# Patient Record
Sex: FEMALE | Race: WHITE | NOT HISPANIC OR LATINO | Employment: OTHER | URBAN - METROPOLITAN AREA
[De-identification: names, ages, dates, MRNs, and addresses within clinical notes are randomized per-mention and may not be internally consistent; named-entity substitution may affect disease eponyms.]

---

## 2017-01-13 ENCOUNTER — LAB (OUTPATIENT)
Dept: LAB | Facility: CLINIC | Age: 82
End: 2017-01-13
Payer: COMMERCIAL

## 2017-01-13 ENCOUNTER — TRANSCRIBE ORDERS (OUTPATIENT)
Dept: LAB | Facility: CLINIC | Age: 82
End: 2017-01-13

## 2017-01-13 DIAGNOSIS — E03.9 HYPOTHYROIDISM: ICD-10-CM

## 2017-01-13 LAB — TSH SERPL DL<=0.05 MIU/L-ACNC: 1.6 UIU/ML (ref 0.36–3.74)

## 2017-01-13 PROCEDURE — 84443 ASSAY THYROID STIM HORMONE: CPT

## 2017-01-13 PROCEDURE — 36415 COLL VENOUS BLD VENIPUNCTURE: CPT

## 2017-02-08 ENCOUNTER — ALLSCRIPTS OFFICE VISIT (OUTPATIENT)
Dept: OTHER | Facility: OTHER | Age: 82
End: 2017-02-08

## 2017-03-27 ENCOUNTER — ALLSCRIPTS OFFICE VISIT (OUTPATIENT)
Dept: OTHER | Facility: OTHER | Age: 82
End: 2017-03-27

## 2017-08-04 ENCOUNTER — LAB (OUTPATIENT)
Dept: LAB | Facility: CLINIC | Age: 82
End: 2017-08-04
Payer: COMMERCIAL

## 2017-08-04 DIAGNOSIS — I10 ESSENTIAL (PRIMARY) HYPERTENSION: ICD-10-CM

## 2017-08-04 LAB
ALBUMIN SERPL BCP-MCNC: 3.6 G/DL (ref 3.5–5)
ALP SERPL-CCNC: 89 U/L (ref 46–116)
ALT SERPL W P-5'-P-CCNC: 28 U/L (ref 12–78)
ANION GAP SERPL CALCULATED.3IONS-SCNC: 8 MMOL/L (ref 4–13)
AST SERPL W P-5'-P-CCNC: 21 U/L (ref 5–45)
BASOPHILS # BLD AUTO: 0.03 THOUSANDS/ΜL (ref 0–0.1)
BASOPHILS NFR BLD AUTO: 1 % (ref 0–1)
BILIRUB SERPL-MCNC: 0.88 MG/DL (ref 0.2–1)
BUN SERPL-MCNC: 15 MG/DL (ref 5–25)
CALCIUM SERPL-MCNC: 8.9 MG/DL (ref 8.3–10.1)
CHLORIDE SERPL-SCNC: 101 MMOL/L (ref 100–108)
CHOLEST SERPL-MCNC: 180 MG/DL (ref 50–200)
CO2 SERPL-SCNC: 26 MMOL/L (ref 21–32)
CREAT SERPL-MCNC: 0.91 MG/DL (ref 0.6–1.3)
EOSINOPHIL # BLD AUTO: 0.11 THOUSAND/ΜL (ref 0–0.61)
EOSINOPHIL NFR BLD AUTO: 3 % (ref 0–6)
ERYTHROCYTE [DISTWIDTH] IN BLOOD BY AUTOMATED COUNT: 13.6 % (ref 11.6–15.1)
GFR SERPL CREATININE-BSD FRML MDRD: 59 ML/MIN/1.73SQ M
GLUCOSE P FAST SERPL-MCNC: 91 MG/DL (ref 65–99)
HCT VFR BLD AUTO: 39.5 % (ref 34.8–46.1)
HDLC SERPL-MCNC: 71 MG/DL (ref 40–60)
HGB BLD-MCNC: 13.7 G/DL (ref 11.5–15.4)
LDLC SERPL CALC-MCNC: 99 MG/DL (ref 0–100)
LYMPHOCYTES # BLD AUTO: 1.6 THOUSANDS/ΜL (ref 0.6–4.47)
LYMPHOCYTES NFR BLD AUTO: 42 % (ref 14–44)
MCH RBC QN AUTO: 32.1 PG (ref 26.8–34.3)
MCHC RBC AUTO-ENTMCNC: 34.7 G/DL (ref 31.4–37.4)
MCV RBC AUTO: 93 FL (ref 82–98)
MONOCYTES # BLD AUTO: 0.37 THOUSAND/ΜL (ref 0.17–1.22)
MONOCYTES NFR BLD AUTO: 10 % (ref 4–12)
NEUTROPHILS # BLD AUTO: 1.67 THOUSANDS/ΜL (ref 1.85–7.62)
NEUTS SEG NFR BLD AUTO: 44 % (ref 43–75)
NRBC BLD AUTO-RTO: 0 /100 WBCS
PLATELET # BLD AUTO: 324 THOUSANDS/UL (ref 149–390)
PMV BLD AUTO: 9.5 FL (ref 8.9–12.7)
POTASSIUM SERPL-SCNC: 4.1 MMOL/L (ref 3.5–5.3)
PROT SERPL-MCNC: 7.4 G/DL (ref 6.4–8.2)
RBC # BLD AUTO: 4.27 MILLION/UL (ref 3.81–5.12)
SODIUM SERPL-SCNC: 135 MMOL/L (ref 136–145)
TRIGL SERPL-MCNC: 50 MG/DL
TSH SERPL DL<=0.05 MIU/L-ACNC: 0.89 UIU/ML (ref 0.36–3.74)
WBC # BLD AUTO: 3.79 THOUSAND/UL (ref 4.31–10.16)

## 2017-08-04 PROCEDURE — 80061 LIPID PANEL: CPT

## 2017-08-04 PROCEDURE — 84443 ASSAY THYROID STIM HORMONE: CPT

## 2017-08-04 PROCEDURE — 85025 COMPLETE CBC W/AUTO DIFF WBC: CPT

## 2017-08-04 PROCEDURE — 80053 COMPREHEN METABOLIC PANEL: CPT

## 2017-08-04 PROCEDURE — 36415 COLL VENOUS BLD VENIPUNCTURE: CPT

## 2017-08-08 DIAGNOSIS — I10 ESSENTIAL (PRIMARY) HYPERTENSION: ICD-10-CM

## 2017-08-11 ENCOUNTER — ALLSCRIPTS OFFICE VISIT (OUTPATIENT)
Dept: OTHER | Facility: OTHER | Age: 82
End: 2017-08-11

## 2017-11-02 ENCOUNTER — GENERIC CONVERSION - ENCOUNTER (OUTPATIENT)
Dept: OTHER | Facility: OTHER | Age: 82
End: 2017-11-02

## 2018-01-13 VITALS
HEIGHT: 60 IN | BODY MASS INDEX: 27.51 KG/M2 | HEART RATE: 60 BPM | WEIGHT: 140.13 LBS | DIASTOLIC BLOOD PRESSURE: 80 MMHG | SYSTOLIC BLOOD PRESSURE: 152 MMHG

## 2018-01-13 VITALS
DIASTOLIC BLOOD PRESSURE: 86 MMHG | TEMPERATURE: 97 F | BODY MASS INDEX: 27.29 KG/M2 | HEART RATE: 60 BPM | SYSTOLIC BLOOD PRESSURE: 138 MMHG | OXYGEN SATURATION: 98 % | HEIGHT: 60 IN | WEIGHT: 139 LBS

## 2018-01-14 VITALS
WEIGHT: 141 LBS | HEIGHT: 59 IN | BODY MASS INDEX: 28.43 KG/M2 | DIASTOLIC BLOOD PRESSURE: 64 MMHG | SYSTOLIC BLOOD PRESSURE: 142 MMHG | TEMPERATURE: 97.1 F

## 2018-01-23 NOTE — PROGRESS NOTES
Assessment    1  Encounter for preventive health examination (V70 0) (Z00 00)    Discussion/Summary  Impression: Initial Annual Wellness Visit  Cardiovascular screening and counseling: screening is current  Diabetes screening and counseling: screening is current  Colorectal cancer screening and counseling: screening is current  Breast cancer screening and counseling: screening not indicated  Cervical cancer screening and counseling: screening not indicated  Osteoporosis screening and counseling: screening is current  Abdominal aortic aneurysm screening and counseling: screening not indicated  Glaucoma screening and counseling: screening is current  Immunizations: the patient declines the influenza vaccination, the lifetime pneumococcal vaccine has been completed, Td vaccination up to date and Tdap vaccination up to date  Advance Directive Planning: complete and up to date, paperwork and instructions were given to the patient  Patient Discussion: plan discussed with the patient, follow-up visit needed in one year  Chief Complaint  MED WELL      History of Present Illness  Welcome to Medicare and Wellness Visits: The patient is being seen for the subsequent annual wellness visit  Medicare Screening and Risk Factors   Hospitalizations: no previous hospitalizations  Once per lifetime medicare screening tests: AAA screening   Medicare Screening Tests Risk Questions   Osteoporosis risk assessment:  and over 48years of age  HIV risk assessment: none indicated  Drug and Alcohol Use: The patient has never smoked cigarettes  The patient reports never drinking alcohol  She has never used illicit drugs  Diet and Physical Activity: Current diet includes well balanced meals, 3 servings of fruit per day, 3 servings of vegetables per day, 2 servings of meat per day, 1 servings of whole grains per day, 2 cups of coffee per day and 2 cups of tea per day   She exercises 3 times per week  Exercise: walking, stretching, strength training 60 minutes per week  Mood Disorder and Cognitive Impairment Screening:   Depression screening  negative for symptoms  Functional Ability/Level of Safety: Hearing is slightly decreased and a hearing aid is used  The patient is currently able to do activities of daily living without limitations, able to do instrumental activities of daily living without limitations, able to participate in social activities without limitations and able to drive without limitations  Activities of daily living details: does not need help using the phone, no transportation help needed, does not need help shopping, no meal preparation help needed, does not need help doing housework, does not need help doing laundry, does not need help managing medications and does not need help managing money  Fall risk factors: The patient fell 0 times in the past 12 months  Advance Directives: Advance directives: no living will  Co-Managers and Medical Equipment/Suppliers: See Patient Care Team   Preventive Quality Program 65 and Older: Falls Risk: The patient fell 0 times in the past 12 months  Patient Care Team    Care Team Member Role Specialty Office Number   [address] [phone]   [facility]  Cardiology [phone]   [facility]  Gastroenterology Adult [phone]   Scott Lane DO  Cardiology [phone]     Active Problems     1  Age-related osteoporosis without fracture (733 01) (M81 8)   2  Atrial fibrillation (427 31) (I48 91)   3  CAD (coronary artery disease) (414 00) (I25 10)   4  Hypercholesterolemia (272 0) (E78 00)   5  Hyperlipidemia (272 4) (E78 5)   6  Hypothyroidism (244 9) (E03 9)   7  Neuropathy (355 9) (G62 9)   8  Postherpetic neuralgia (053 19) (B02 29)   9  Screening for genitourinary condition (V81 6) (Z13 89)   10  Special screening for other neurological conditions (V80 09) (Z13 89)   11   Supraventricular tachycardia (427 89) (I47 1)    Acquired hypothyroidism (244 9) (E03 9)       Benign essential hypertension (401 1) (I10)          Past Medical History    · History of Abnormal EKG (794 31) (R94 31)   · History of Chest pain syndrome (786 50) (R07 9)   · History of cardiac arrhythmia (V12 59) (Z86 79)   · History of esophageal reflux (V12 79) (Z87 19)   · History of gastroesophageal reflux (GERD) (V12 79) (Z87 19)   · History of hypertension (V12 59) (Z86 79)   · History of hypothyroidism (V12 29) (Z86 39)   · History of Palpitations (785 1) (R00 2)   · History of Varicella-zoster infection (053 9) (B02 9,B01  9)    The active problems and past medical history were reviewed and updated today  Surgical History    · History of Cholecystectomy   · History of Colon Surgery   · History of Hysterectomy   · History of PTCA   · History of Small Bowel Resection Partial    The surgical history was reviewed and updated today  Family History  Mother    · Family history of    · Family history of cardiac disorder (V17 49) (Z80 55)  Father    · Family history of    · Family history of cardiac disorder (V17 49) (Z82 49)   · Family history of chronic obstructive pulmonary disease (V17 6) (Z82 5)    The family history was reviewed and updated today  Social History    ·    · Never a smoker   · No alcohol use   · No drug use   · Non-smoker (V49 89) (Z78 9)   · Retired   · 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[employer]   · 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  The social history was reviewed and is unchanged  Current Meds   1  ALPRAZolam 0 25 MG Oral Tablet; take 1 tablet daily as needed; Therapy: (Recorded:42Ymn0094) to Recorded   2  Aspirin Low Dose 81 MG TABS; Take 1 tablet daily; Therapy: 64REL6390 to (Evaluate:15Cvw7523) Recorded   3  CoQ-10 10 MG CAPS; TAKE CAPSULE Daily; Therapy: 85Iem4560 to Recorded   4  Levothyroxine Sodium 75 MCG Oral Tablet; TAKE 1 TABLET DAILY;    Therapy: (Benjiman Duverney) to Recorded   5  Metoprolol Succinate ER 25 MG Oral Tablet Extended Release 24 Hour; take 0 5 tablet   daily; Therapy: 20Sdo2854 to (Evaluate:61Kdy6777)  Requested for: 44Jpb9487; Last   Rx:[id number]; Status: ACTIVE - Transmit to [place] Verification Ordered    The medication list was reviewed and updated today  Allergies    1  Bextra TABS   2  codeine   3  Demerol SOLN   4  Diovan   5  enalapril   6  lisinopril   7  Penicillins   8  sulfa   9  tramadol   10  Zmax SUSR   11  Zocor    Immunizations   1    Tdap  09-Sep-2015     Physical Exam    Constitutional   General appearance: No acute distress, well appearing and well nourished  Results/Data  PHQ-9 Adult Depression Screening 07WGI0856 10:41AM User, s     Test Name Result Flag Reference   PHQ-9 Adult Depression Score 1     Over the last two weeks, how often have you been bothered by any of the following problems? Little interest or pleasure in doing things: Not at all - 0  Feeling down, depressed, or hopeless: Not at all - 0  Trouble falling or staying asleep, or sleeping too much: Not at all - 0  Feeling tired or having little energy: Several days - 1  Poor appetite or over eating: Not at all - 0  Feeling bad about yourself - or that you are a failure or have let yourself or your family down: Not at all - 0  Trouble concentrating on things, such as reading the newspaper or watching television: Not at all - 0  Moving or speaking so slowly that other people could have noticed  Or the opposite -  being so fidgety or restless that you have been moving around a lot more than usual: Not at all - 0  Thoughts that you would be better off dead, or of hurting yourself in some way: Not at all - 0   PHQ-9 Adult Depression Screening Negative     PHQ-9 Difficulty Level Not difficult at all     PHQ-9 Severity Minimal Depression         Future Appointments    Date/Time Provider Specialty Site   03/27/2017 11:20 AM SHASTA Doyle   Cardiology [facility] CARDIOLOGY [facility]     Signatures   Electronically signed by : Omar Dueñas DO; Feb 8 2017 10:56AM EST                       (Author)

## 2018-02-11 DIAGNOSIS — E03.9 HYPOTHYROIDISM: ICD-10-CM

## 2018-02-20 ENCOUNTER — APPOINTMENT (OUTPATIENT)
Dept: LAB | Facility: CLINIC | Age: 83
End: 2018-02-20
Payer: COMMERCIAL

## 2018-02-20 DIAGNOSIS — E03.9 HYPOTHYROIDISM: ICD-10-CM

## 2018-02-20 DIAGNOSIS — E55.9 VITAMIN D DEFICIENCY: Primary | ICD-10-CM

## 2018-02-20 LAB
25(OH)D3 SERPL-MCNC: 47.2 NG/ML (ref 30–100)
TSH SERPL DL<=0.05 MIU/L-ACNC: 1.37 UIU/ML (ref 0.36–3.74)

## 2018-02-20 PROCEDURE — 84443 ASSAY THYROID STIM HORMONE: CPT

## 2018-02-20 PROCEDURE — 82306 VITAMIN D 25 HYDROXY: CPT

## 2018-02-20 PROCEDURE — 36415 COLL VENOUS BLD VENIPUNCTURE: CPT

## 2018-02-27 ENCOUNTER — OFFICE VISIT (OUTPATIENT)
Dept: FAMILY MEDICINE CLINIC | Facility: CLINIC | Age: 83
End: 2018-02-27
Payer: COMMERCIAL

## 2018-02-27 VITALS
WEIGHT: 140 LBS | DIASTOLIC BLOOD PRESSURE: 78 MMHG | HEART RATE: 111 BPM | TEMPERATURE: 97.3 F | BODY MASS INDEX: 28.22 KG/M2 | SYSTOLIC BLOOD PRESSURE: 130 MMHG | HEIGHT: 59 IN | OXYGEN SATURATION: 96 %

## 2018-02-27 DIAGNOSIS — I10 ESSENTIAL HYPERTENSION: Primary | ICD-10-CM

## 2018-02-27 DIAGNOSIS — E03.9 HYPOTHYROIDISM, UNSPECIFIED TYPE: ICD-10-CM

## 2018-02-27 DIAGNOSIS — E78.00 HYPERCHOLESTEROLEMIA: ICD-10-CM

## 2018-02-27 DIAGNOSIS — I25.10 CORONARY ARTERY DISEASE INVOLVING NATIVE CORONARY ARTERY OF NATIVE HEART WITHOUT ANGINA PECTORIS: ICD-10-CM

## 2018-02-27 DIAGNOSIS — I47.1 SVT (SUPRAVENTRICULAR TACHYCARDIA) (HCC): ICD-10-CM

## 2018-02-27 DIAGNOSIS — Z23 NEED FOR PNEUMOCOCCAL VACCINE: ICD-10-CM

## 2018-02-27 PROCEDURE — 99214 OFFICE O/P EST MOD 30 MIN: CPT | Performed by: FAMILY MEDICINE

## 2018-02-27 PROCEDURE — 90670 PCV13 VACCINE IM: CPT

## 2018-02-27 PROCEDURE — G0009 ADMIN PNEUMOCOCCAL VACCINE: HCPCS

## 2018-02-27 NOTE — PROGRESS NOTES
Assessment/Plan:    No problem-specific Assessment & Plan notes found for this encounter  Diagnoses and all orders for this visit:    Essential hypertension  -     CBC and differential; Future  -     Comprehensive metabolic panel; Future    Hypothyroidism, unspecified type  -     TSH, 3rd generation with T4 reflex; Future    Hypercholesterolemia  Comments:  pt has a high HDL and is under good control  Orders:  -     CBC and differential; Future  -     Comprehensive metabolic panel; Future  -     Lipid panel; Future    Coronary artery disease involving native coronary artery of native heart without angina pectoris  Comments:  stable pt is seeing the cardiologist  Orders:  -     Lipid panel; Future    SVT (supraventricular tachycardia) (HCC)  Comments:  resolved since the pt's heart ablation          Subjective:      Patient ID: Mariela Azevedo is a 80 y o  female  Follow up for hypothyroidism pt had a recent TSH which was normal, pt checks Bps at home and sees an average of 130 over 70      Hypertension   This is a chronic problem  The current episode started more than 1 year ago  Pertinent negatives include no chest pain, palpitations or shortness of breath  The following portions of the patient's history were reviewed and updated as appropriate: allergies, current medications, past family history, past medical history, past social history, past surgical history and problem list     Review of Systems   Constitutional: Negative for chills, fatigue and fever  Respiratory: Negative for shortness of breath and wheezing  Cardiovascular: Negative for chest pain and palpitations  Gastrointestinal: Negative for abdominal pain, constipation and diarrhea  Objective:      /78   Pulse (!) 111   Temp (!) 97 3 °F (36 3 °C)   Ht 4' 11" (1 499 m)   Wt 63 5 kg (140 lb)   SpO2 96%   BMI 28 28 kg/m²          Physical Exam   Constitutional: She appears well-developed and well-nourished   No distress  HENT:   Head: Normocephalic and atraumatic  Mouth/Throat: No oropharyngeal exudate  Neck: Normal range of motion  Neck supple  Pulmonary/Chest: No stridor  Abdominal: Soft  Bowel sounds are normal  She exhibits no distension and no mass  There is no tenderness  There is no rebound and no guarding  Lymphadenopathy:     She has no cervical adenopathy  Skin: Skin is warm and dry  No rash noted  She is not diaphoretic  No erythema  No pallor  Psychiatric: She has a normal mood and affect  Her behavior is normal  Judgment and thought content normal    Nursing note and vitals reviewed

## 2018-04-17 ENCOUNTER — OFFICE VISIT (OUTPATIENT)
Dept: CARDIOLOGY CLINIC | Facility: CLINIC | Age: 83
End: 2018-04-17
Payer: COMMERCIAL

## 2018-04-17 VITALS
SYSTOLIC BLOOD PRESSURE: 130 MMHG | DIASTOLIC BLOOD PRESSURE: 80 MMHG | WEIGHT: 137 LBS | BODY MASS INDEX: 27.62 KG/M2 | HEIGHT: 59 IN | HEART RATE: 58 BPM

## 2018-04-17 DIAGNOSIS — I25.10 CORONARY ARTERY DISEASE INVOLVING NATIVE CORONARY ARTERY OF NATIVE HEART WITHOUT ANGINA PECTORIS: Primary | ICD-10-CM

## 2018-04-17 DIAGNOSIS — I47.1 SUPRAVENTRICULAR TACHYCARDIA (HCC): ICD-10-CM

## 2018-04-17 DIAGNOSIS — E78.00 HYPERCHOLESTEROLEMIA: ICD-10-CM

## 2018-04-17 PROCEDURE — 99214 OFFICE O/P EST MOD 30 MIN: CPT | Performed by: INTERNAL MEDICINE

## 2018-04-17 PROCEDURE — 93000 ELECTROCARDIOGRAM COMPLETE: CPT | Performed by: INTERNAL MEDICINE

## 2018-04-17 RX ORDER — METOPROLOL SUCCINATE 25 MG/1
0.5 TABLET, EXTENDED RELEASE ORAL DAILY
COMMUNITY
Start: 2016-08-30 | End: 2018-08-13 | Stop reason: SDUPTHER

## 2018-04-17 NOTE — PROGRESS NOTES
Cardiology Follow Up    Иван Mckee  1933  [id number]  [address] [facility]  [address] [place] [address] [address]    1  Coronary artery disease involving native coronary artery of native heart without angina pectoris  POCT ECG   2  Supraventricular tachycardia (HCC)  POCT ECG   3  Hypercholesterolemia         Interval History:  Cardiology follow-up  Patient continues to do well, denies any chest discomfort, she still very active for age  She states been compliant with low-cholesterol diet  She is intolerant to statin therapy  Lipids are septal with an LDL 99 few months ago  She is compliant with low-sodium diet, blood pressure is well control as well, she does have multiple drug intolerances      Patient Active Problem List   Diagnosis    Supraventricular tachycardia ([address] )    Coronary artery disease involving native coronary artery of native heart without angina pectoris    Hypercholesterolemia     Past Medical History:   Diagnosis Date    Cardiac arrhythmia     Esophageal reflux     Hypertension     Hypothyroidism     Varicella-zoster infection      Social History     Social History    Marital status: /Civil Union     Spouse name: N/A    Number of children: 3    Years of education: N/A     Occupational History    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     Social History Main Topics    Smoking status: Never Smoker    Smokeless tobacco: Never Used    Alcohol use No    Drug use: No    Sexual activity: Not on file     Other Topics Concern    Not on file     Social History Narrative    No narrative on file      Family History   Problem Relation Age of Onset    Heart disease Mother     Heart disease Father     COPD Father      Past Surgical History:   Procedure Laterality Date    CHOLECYSTECTOMY      COLON SURGERY      CORONARY ANGIOPLASTY      PARTIAL HYSTERECTOMY      SMALL INTESTINE SURGERY Partial       Current Outpatient Prescriptions:     aspirin 81 MG tablet, Take 81 mg by mouth daily  , Disp: , Rfl:     Coenzyme Q10 (CO Q10) 100 MG CAPS, Take 1 capsule by mouth daily  , Disp: , Rfl:     levothyroxine 75 mcg tablet, Take 75 mcg by mouth daily  , Disp: , Rfl:     metoprolol succinate (TOPROL-XL) 25 mg 24 hr tablet, Take 0 5 tablets by mouth daily, Disp: , Rfl:     Vitamin Mixture (VITAMIN E COMPLETE PO), Take 150 mg by mouth daily  , Disp: , Rfl:   Allergies   Allergen Reactions    Bextra [Valdecoxib]     Codeine     Demerol [Meperidine]     Diovan [Valsartan]     Enalapril     Lisinopril     Penicillins     Sulfa Antibiotics     Tramadol     Vicodin [Hydrocodone-Acetaminophen]     Zithromax [Azithromycin]     Zocor [Simvastatin]        Labs:  Appointment on 02/20/2018   Component Date Value    TSH 3RD GENERATON 02/20/2018 1 370    Orders Only on 02/20/2018   Component Date Value    Vit D, 25-Hydroxy 02/20/2018 47 2      Imaging: No results found  Review of Systems:  Review of Systems   Constitutional: Negative for activity change, fatigue, fever and unexpected weight change  HENT: Negative for nosebleeds  Eyes: Negative for visual disturbance  Respiratory: Positive for shortness of breath  Negative for wheezing and stridor  Cardiovascular: Positive for leg swelling  Negative for chest pain and palpitations  Gastrointestinal: Negative for blood in stool  Endocrine: Negative for cold intolerance  Genitourinary: Negative for hematuria  Musculoskeletal: Negative for arthralgias, joint swelling and myalgias  Skin: Negative for color change, pallor and rash  Allergic/Immunologic: Negative for immunocompromised state  Neurological: Negative for dizziness, tremors, syncope and weakness  Hematological: Does not bruise/bleed easily  Psychiatric/Behavioral: Negative for confusion         Physical Exam:  Physical Exam   Constitutional: She is oriented to person, place, and time  She appears well-developed and well-nourished  No distress  Eyes: No scleral icterus  Neck: No JVD present  No tracheal deviation present  No thyromegaly present  Cardiovascular: Normal rate, regular rhythm, normal heart sounds and intact distal pulses  Exam reveals no gallop and no friction rub  No murmur heard  Pulmonary/Chest: Effort normal  No respiratory distress  She has no wheezes  She has no rales  She exhibits no tenderness  Neurological: She is alert and oriented to person, place, and time  Skin: She is not diaphoretic  Psychiatric: She has a normal mood and affect  Discussion/Summary:  Coronary artery disease PTCA/drug stent of the LAD in 2008  This test 2013 was negative for ischemia function was normal at that time  The patient is doing well  She is agreeable to a regular stress test   Continue current medications

## 2018-05-07 ENCOUNTER — HOSPITAL ENCOUNTER (OUTPATIENT)
Dept: NON INVASIVE DIAGNOSTICS | Facility: HOSPITAL | Age: 83
Discharge: HOME | End: 2018-05-07
Payer: COMMERCIAL

## 2018-05-07 DIAGNOSIS — I47.1 SUPRAVENTRICULAR TACHYCARDIA (HCC): ICD-10-CM

## 2018-05-07 DIAGNOSIS — I25.10 CORONARY ARTERY DISEASE INVOLVING NATIVE CORONARY ARTERY OF NATIVE HEART WITHOUT ANGINA PECTORIS: ICD-10-CM

## 2018-05-07 LAB
CHEST PAIN STATEMENT: NORMAL
MAX DIASTOLIC BP: 80 MMHG
MAX HEART RATE: 136 BPM
MAX PREDICTED HEART RATE: 136 BPM
MAX. SYSTOLIC BP: 172 MMHG
PROTOCOL NAME: NORMAL
REASON FOR TERMINATION: NORMAL
TARGET HR FORMULA: NORMAL
TEST INDICATION: NORMAL
TIME IN EXERCISE PHASE: NORMAL

## 2018-05-07 PROCEDURE — 93017 CV STRESS TEST TRACING ONLY: CPT

## 2018-05-07 PROCEDURE — 93016 CV STRESS TEST SUPVJ ONLY: CPT | Performed by: INTERNAL MEDICINE

## 2018-05-07 PROCEDURE — 93018 CV STRESS TEST I&R ONLY: CPT | Performed by: INTERNAL MEDICINE

## 2018-05-25 ENCOUNTER — TELEPHONE (OUTPATIENT)
Dept: CARDIOLOGY CLINIC | Facility: CLINIC | Age: 83
End: 2018-05-25

## 2018-05-25 NOTE — TELEPHONE ENCOUNTER
Sheyla Shaw Pt's daughter-in-law called requesting results of stress  Please advise     c/b # [phone]

## 2018-07-26 DIAGNOSIS — E03.9 ACQUIRED HYPOTHYROIDISM: Primary | ICD-10-CM

## 2018-07-26 RX ORDER — LEVOTHYROXINE SODIUM 0.07 MG/1
75 TABLET ORAL DAILY
Qty: 90 TABLET | Refills: 2 | Status: SHIPPED | OUTPATIENT
Start: 2018-07-26 | End: 2019-05-01 | Stop reason: SDUPTHER

## 2018-08-13 DIAGNOSIS — I47.1 SVT (SUPRAVENTRICULAR TACHYCARDIA) (HCC): Primary | ICD-10-CM

## 2018-08-13 RX ORDER — METOPROLOL SUCCINATE 25 MG/1
12.5 TABLET, EXTENDED RELEASE ORAL DAILY
Qty: 45 TABLET | Refills: 3 | Status: SHIPPED | OUTPATIENT
Start: 2018-08-13 | End: 2019-10-15 | Stop reason: SDUPTHER

## 2018-08-28 ENCOUNTER — APPOINTMENT (OUTPATIENT)
Dept: LAB | Facility: CLINIC | Age: 83
End: 2018-08-28
Payer: COMMERCIAL

## 2018-08-28 DIAGNOSIS — E03.9 HYPOTHYROIDISM, UNSPECIFIED TYPE: ICD-10-CM

## 2018-08-28 DIAGNOSIS — I10 ESSENTIAL HYPERTENSION: ICD-10-CM

## 2018-08-28 DIAGNOSIS — I25.10 CORONARY ARTERY DISEASE INVOLVING NATIVE CORONARY ARTERY OF NATIVE HEART WITHOUT ANGINA PECTORIS: ICD-10-CM

## 2018-08-28 DIAGNOSIS — E78.00 HYPERCHOLESTEROLEMIA: ICD-10-CM

## 2018-08-28 LAB
ALBUMIN SERPL BCP-MCNC: 3.8 G/DL (ref 3.5–5)
ALP SERPL-CCNC: 88 U/L (ref 46–116)
ALT SERPL W P-5'-P-CCNC: 26 U/L (ref 12–78)
ANION GAP SERPL CALCULATED.3IONS-SCNC: 7 MMOL/L (ref 4–13)
AST SERPL W P-5'-P-CCNC: 22 U/L (ref 5–45)
BASOPHILS # BLD AUTO: 0.04 THOUSANDS/ΜL (ref 0–0.1)
BASOPHILS NFR BLD AUTO: 1 % (ref 0–1)
BILIRUB SERPL-MCNC: 0.76 MG/DL (ref 0.2–1)
BUN SERPL-MCNC: 18 MG/DL (ref 5–25)
CALCIUM SERPL-MCNC: 8.9 MG/DL (ref 8.3–10.1)
CHLORIDE SERPL-SCNC: 99 MMOL/L (ref 100–108)
CHOLEST SERPL-MCNC: 185 MG/DL (ref 50–200)
CO2 SERPL-SCNC: 26 MMOL/L (ref 21–32)
CREAT SERPL-MCNC: 0.94 MG/DL (ref 0.6–1.3)
EOSINOPHIL # BLD AUTO: 0.12 THOUSAND/ΜL (ref 0–0.61)
EOSINOPHIL NFR BLD AUTO: 3 % (ref 0–6)
ERYTHROCYTE [DISTWIDTH] IN BLOOD BY AUTOMATED COUNT: 13.2 % (ref 11.6–15.1)
GFR SERPL CREATININE-BSD FRML MDRD: 55 ML/MIN/1.73SQ M
GLUCOSE P FAST SERPL-MCNC: 98 MG/DL (ref 65–99)
HCT VFR BLD AUTO: 41.4 % (ref 34.8–46.1)
HDLC SERPL-MCNC: 72 MG/DL (ref 40–60)
HGB BLD-MCNC: 13.9 G/DL (ref 11.5–15.4)
IMM GRANULOCYTES # BLD AUTO: 0.01 THOUSAND/UL (ref 0–0.2)
IMM GRANULOCYTES NFR BLD AUTO: 0 % (ref 0–2)
LDLC SERPL CALC-MCNC: 102 MG/DL (ref 0–100)
LYMPHOCYTES # BLD AUTO: 1.82 THOUSANDS/ΜL (ref 0.6–4.47)
LYMPHOCYTES NFR BLD AUTO: 42 % (ref 14–44)
MCH RBC QN AUTO: 31.4 PG (ref 26.8–34.3)
MCHC RBC AUTO-ENTMCNC: 33.6 G/DL (ref 31.4–37.4)
MCV RBC AUTO: 94 FL (ref 82–98)
MONOCYTES # BLD AUTO: 0.53 THOUSAND/ΜL (ref 0.17–1.22)
MONOCYTES NFR BLD AUTO: 12 % (ref 4–12)
NEUTROPHILS # BLD AUTO: 1.84 THOUSANDS/ΜL (ref 1.85–7.62)
NEUTS SEG NFR BLD AUTO: 42 % (ref 43–75)
NONHDLC SERPL-MCNC: 113 MG/DL
NRBC BLD AUTO-RTO: 0 /100 WBCS
PLATELET # BLD AUTO: 324 THOUSANDS/UL (ref 149–390)
PMV BLD AUTO: 9 FL (ref 8.9–12.7)
POTASSIUM SERPL-SCNC: 4.2 MMOL/L (ref 3.5–5.3)
PROT SERPL-MCNC: 7.6 G/DL (ref 6.4–8.2)
RBC # BLD AUTO: 4.43 MILLION/UL (ref 3.81–5.12)
SODIUM SERPL-SCNC: 132 MMOL/L (ref 136–145)
TRIGL SERPL-MCNC: 54 MG/DL
TSH SERPL DL<=0.05 MIU/L-ACNC: 0.81 UIU/ML (ref 0.36–3.74)
WBC # BLD AUTO: 4.36 THOUSAND/UL (ref 4.31–10.16)

## 2018-08-28 PROCEDURE — 85025 COMPLETE CBC W/AUTO DIFF WBC: CPT

## 2018-08-28 PROCEDURE — 80053 COMPREHEN METABOLIC PANEL: CPT

## 2018-08-28 PROCEDURE — 80061 LIPID PANEL: CPT

## 2018-08-28 PROCEDURE — 36415 COLL VENOUS BLD VENIPUNCTURE: CPT

## 2018-08-28 PROCEDURE — 84443 ASSAY THYROID STIM HORMONE: CPT

## 2018-09-07 ENCOUNTER — OFFICE VISIT (OUTPATIENT)
Dept: FAMILY MEDICINE CLINIC | Facility: CLINIC | Age: 83
End: 2018-09-07
Payer: COMMERCIAL

## 2018-09-07 VITALS
DIASTOLIC BLOOD PRESSURE: 74 MMHG | WEIGHT: 140 LBS | OXYGEN SATURATION: 97 % | TEMPERATURE: 98 F | BODY MASS INDEX: 28.22 KG/M2 | HEIGHT: 59 IN | HEART RATE: 65 BPM | SYSTOLIC BLOOD PRESSURE: 132 MMHG

## 2018-09-07 DIAGNOSIS — E03.9 ACQUIRED HYPOTHYROIDISM: ICD-10-CM

## 2018-09-07 DIAGNOSIS — I10 ESSENTIAL HYPERTENSION: ICD-10-CM

## 2018-09-07 DIAGNOSIS — Z00.00 MEDICARE ANNUAL WELLNESS VISIT, SUBSEQUENT: Primary | ICD-10-CM

## 2018-09-07 DIAGNOSIS — E78.00 HYPERCHOLESTEROLEMIA: ICD-10-CM

## 2018-09-07 PROCEDURE — 1160F RVW MEDS BY RX/DR IN RCRD: CPT | Performed by: FAMILY MEDICINE

## 2018-09-07 PROCEDURE — 4040F PNEUMOC VAC/ADMIN/RCVD: CPT | Performed by: FAMILY MEDICINE

## 2018-09-07 PROCEDURE — 99214 OFFICE O/P EST MOD 30 MIN: CPT | Performed by: FAMILY MEDICINE

## 2018-09-07 PROCEDURE — 3075F SYST BP GE 130 - 139MM HG: CPT | Performed by: FAMILY MEDICINE

## 2018-09-07 PROCEDURE — 1125F AMNT PAIN NOTED PAIN PRSNT: CPT | Performed by: FAMILY MEDICINE

## 2018-09-07 PROCEDURE — 3725F SCREEN DEPRESSION PERFORMED: CPT | Performed by: FAMILY MEDICINE

## 2018-09-07 PROCEDURE — 3078F DIAST BP <80 MM HG: CPT | Performed by: FAMILY MEDICINE

## 2018-09-07 PROCEDURE — G0439 PPPS, SUBSEQ VISIT: HCPCS | Performed by: FAMILY MEDICINE

## 2018-09-07 PROCEDURE — 1170F FXNL STATUS ASSESSED: CPT | Performed by: FAMILY MEDICINE

## 2018-09-07 PROCEDURE — 1036F TOBACCO NON-USER: CPT | Performed by: FAMILY MEDICINE

## 2018-09-07 RX ORDER — MELATONIN
4000 DAILY
COMMUNITY

## 2018-09-07 NOTE — PROGRESS NOTES
Assessment/Plan:    No problem-specific Assessment & Plan notes found for this encounter  Diagnoses and all orders for this visit:    Medicare annual wellness visit, subsequent    Acquired hypothyroidism  -     TSH, 3rd generation with Free T4 reflex; Future    Essential hypertension    Hypercholesterolemia    Other orders  -     cholecalciferol (VITAMIN D3) 1,000 units tablet; Take 5,000 Units by mouth every other day          Subjective:      Patient ID: Иван Mckee is a 80 y o  female  Follow up for hypothyroidism which is well controlled      Hypertension   This is a chronic problem  The problem is controlled  Pertinent negatives include no chest pain, palpitations or shortness of breath  Past treatments include beta blockers  The current treatment provides moderate improvement  Compliance problems include diet and exercise  Hyperlipidemia   This is a chronic problem  The current episode started more than 1 year ago  The problem is controlled  Recent lipid tests were reviewed and are normal  Pertinent negatives include no chest pain, myalgias or shortness of breath  The following portions of the patient's history were reviewed and updated as appropriate: allergies, current medications, past family history, past medical history, past social history, past surgical history and problem list     Review of Systems   Constitutional: Negative for chills, fatigue and fever  HENT: Negative  Eyes: Negative  Respiratory: Negative for shortness of breath and wheezing  Cardiovascular: Negative for chest pain and palpitations  Gastrointestinal: Negative for abdominal pain, blood in stool, constipation, diarrhea, nausea and vomiting  Endocrine: Negative  Genitourinary: Negative for dysuria  Musculoskeletal: Negative for arthralgias and myalgias  Skin: Negative  Allergic/Immunologic: Negative  Neurological: Negative for seizures and syncope     Hematological: Negative for adenopathy  Psychiatric/Behavioral: Negative  Objective:      /74   Pulse 65   Temp 98 °F (36 7 °C) (Tympanic)   Ht 4' 11" (1 499 m)   Wt 63 5 kg (140 lb)   SpO2 97%   BMI 28 28 kg/m²          Physical Exam   Constitutional: She is oriented to person, place, and time  She appears well-developed and well-nourished  No distress  HENT:   Head: Normocephalic and atraumatic  Eyes: Conjunctivae and EOM are normal  Pupils are equal, round, and reactive to light  No scleral icterus  Neck: Normal range of motion  Neck supple  Cardiovascular: Normal rate, regular rhythm and normal heart sounds  No murmur heard  Pulmonary/Chest: Effort normal and breath sounds normal  No respiratory distress  She has no wheezes  She has no rales  Abdominal: Soft  Bowel sounds are normal  She exhibits no distension and no mass  There is no tenderness  There is no rebound and no guarding  Musculoskeletal: She exhibits no edema  Lymphadenopathy:     She has no cervical adenopathy  Neurological: She is alert and oriented to person, place, and time  She exhibits normal muscle tone  Skin: Skin is warm and dry  No rash noted  She is not diaphoretic  No erythema  No pallor  Psychiatric: She has a normal mood and affect  Her behavior is normal  Judgment and thought content normal    Nursing note and vitals reviewed

## 2018-09-07 NOTE — PROGRESS NOTES
Assessment and Plan:    Problem List Items Addressed This Visit     None      Visit Diagnoses     Medicare annual wellness visit, subsequent    -  Primary        Health Maintenance Due   Topic Date Due    Depression Screening PHQ  1933    Fall Risk  08/15/1998    Urinary Incontinence Screening  08/15/1998    INFLUENZA VACCINE  09/01/2018         HPI:  Arron Huang is a 80 y o  female here for her Subsequent Wellness Visit  Patient Active Problem List   Diagnosis    Supraventricular tachycardia ([address] )    Coronary artery disease involving native coronary artery of native heart without angina pectoris    Hypercholesterolemia     Past Medical History:   Diagnosis Date    Cardiac arrhythmia     Esophageal reflux     Hypertension     Hypothyroidism     Varicella-zoster infection      Past Surgical History:   Procedure Laterality Date    CHOLECYSTECTOMY      COLON SURGERY      CORONARY ANGIOPLASTY      PARTIAL HYSTERECTOMY      SMALL INTESTINE SURGERY      Partial     Family History   Problem Relation Age of Onset    Heart disease Mother     Heart disease Father     COPD Father      History   Smoking Status    Never Smoker   Smokeless Tobacco    Never Used     History   Alcohol Use No      History   Drug Use No       Current Outpatient Prescriptions   Medication Sig Dispense Refill    aspirin 81 MG tablet Take 81 mg by mouth daily   cholecalciferol (VITAMIN D3) 1,000 units tablet Take 5,000 Units by mouth every other day      Coenzyme Q10 (CO Q10) 100 MG CAPS Take 1 capsule by mouth daily   levothyroxine 75 mcg tablet Take 1 tablet (75 mcg total) by mouth daily 90 tablet 2    metoprolol succinate (TOPROL-XL) 25 mg 24 hr tablet Take 0 5 tablets (12 5 mg total) by mouth daily 45 tablet 3    Vitamin Mixture (VITAMIN E COMPLETE PO) Take 150 mg by mouth daily  No current facility-administered medications for this visit        Allergies   Allergen Reactions    Bextra [Valdecoxib]     Codeine     Demerol [Meperidine]     Diovan [Valsartan]     Enalapril     Lisinopril     Penicillins     Sulfa Antibiotics     Tramadol     Vicodin [Hydrocodone-Acetaminophen]     Zithromax [Azithromycin]     Zocor [Simvastatin]      Immunization History   Administered Date(s) Administered    DT (pediatric) 06/07/2001    Influenza 12/12/2017    Influenza TIV (IM) 10/20/2010    Pneumococcal Conjugate 13-Valent 02/27/2018    Pneumococcal Polysaccharide PPV23 02/09/2007    Tdap 09/09/2015       Patient Care Team:  Cristofer Tom DO as PCP - General  MD Cristofer Sapp DO Arlice Marlin, MD    Medicare Screening Tests and Risk Assessments:      Health Risk Assessment:  Patient rates overall health as good  Patient feels that their physical health rating is Slightly worse  Eyesight was rated as Same  Hearing was rated as Same  Patient feels that their emotional and mental health rating is Same  Pain experienced by patient in the last 7 days has been None  Patient states that she has experienced no weight loss or gain in last 6 months  Emotional/Mental Health:  Patient has been feeling nervous/anxious  PHQ-9 Depression Screening:    Frequency of the following problems over the past two weeks:      1  Little interest or pleasure in doing things: 0 - not at all      2  Feeling down, depressed, or hopeless: 0 - not at all  PHQ-2 Score: 0          Broken Bones/Falls: Fall Risk Assessment:    In the past year, patient has experienced: No history of falling in past year          Bladder/Bowel:  Patient has leaked urine accidently in the last six months  Patient reports no loss of bowel control  Immunizations:  Patient has had a flu vaccination within the last year  Patient has received a pneumonia shot  Patient has not received a shingles shot  Patient has received tetanus/diphtheria shot       Home Safety:  Patient does not have trouble with stairs inside or outside of their home  Patient currently reports that there are no safety hazards present in home, working smoke alarms, no working carbon monoxide detectors  Preventative Screenings:   Breast cancer screening performed, colon cancer screen completed, cholesterol screen completed, glaucoma eye exam completed,     Nutrition:  Current diet: Regular with servings of the following:  (Additional Comments: Low acid)    Medications:  Patient is currently taking over-the-counter supplements  List of OTC medications includes: vitamins  Patient is able to manage medications  Lifestyle Choices:  Patient reports no tobacco use  Patient has not smoked or used tobacco in the past   Patient reports no alcohol use  Patient drives a vehicle  Patient wears seat belt  Activities of Daily Living:  Can get out of bed by his or her self, able to dress self, able to make own meals, able to do own shopping, able to bathe self, can do own laundry/housekeeping, can manage own money, pay bills and track expenses    Previous Hospitalizations:  No hospitalization or ED visit in past 12 months        Advanced Directives:  Patient has decided on a power of   Patient has completed advanced directive  Preventative Screening/Counseling:      Cardiovascular:      General: Screening Current          Diabetes:      General: Screening Current          Colorectal Cancer:      General: Screening Current          Breast Cancer:      General: Patient Declines          Cervical Cancer:      General: Patient Declines          Osteoporosis:      General: Screening Current          AAA:      General: Screening Not Indicated          Glaucoma:      General: Screening Current          Advanced Directives:   Patient has living will for healthcare, has durable POA for healthcare, patient has an advanced directive  End of life assessment reviewed with patient  Provider agrees with end of life decisions   Immunizations:      Influenza: Influenza Recommended Annually      Shingrix: Risks & Benefits Discussed      TD: Td Vaccine UTD      TDAP: Tdap Vaccine UTD

## 2019-02-15 ENCOUNTER — TELEPHONE (OUTPATIENT)
Dept: OTHER | Facility: OTHER | Age: 84
End: 2019-02-15

## 2019-02-15 ENCOUNTER — OFFICE VISIT (OUTPATIENT)
Dept: FAMILY MEDICINE CLINIC | Facility: CLINIC | Age: 84
End: 2019-02-15
Payer: COMMERCIAL

## 2019-02-15 VITALS
BODY MASS INDEX: 28.26 KG/M2 | DIASTOLIC BLOOD PRESSURE: 74 MMHG | TEMPERATURE: 100 F | HEIGHT: 59 IN | SYSTOLIC BLOOD PRESSURE: 122 MMHG | WEIGHT: 140.2 LBS

## 2019-02-15 DIAGNOSIS — J01.00 ACUTE NON-RECURRENT MAXILLARY SINUSITIS: Primary | ICD-10-CM

## 2019-02-15 PROCEDURE — 99213 OFFICE O/P EST LOW 20 MIN: CPT | Performed by: FAMILY MEDICINE

## 2019-02-15 PROCEDURE — 1036F TOBACCO NON-USER: CPT | Performed by: FAMILY MEDICINE

## 2019-02-15 PROCEDURE — 87631 RESP VIRUS 3-5 TARGETS: CPT | Performed by: FAMILY MEDICINE

## 2019-02-15 RX ORDER — LEVOFLOXACIN 500 MG/1
500 TABLET, FILM COATED ORAL EVERY 24 HOURS
Qty: 7 TABLET | Refills: 0 | Status: SHIPPED | OUTPATIENT
Start: 2019-02-15 | End: 2019-02-22

## 2019-02-15 NOTE — PROGRESS NOTES
Assessment/Plan:    No problem-specific Assessment & Plan notes found for this encounter  Diagnoses and all orders for this visit:    Acute non-recurrent maxillary sinusitis  -     levofloxacin (LEVAQUIN) 500 mg tablet; Take 1 tablet (500 mg total) by mouth every 24 hours for 7 days          Subjective:      Patient ID: Sid Wahl is a 80 y o  female  Sinusitis   This is a new problem  The current episode started in the past 7 days  Associated symptoms include chills, congestion, coughing and ear pain  Pertinent negatives include no shortness of breath  Treatments tried: mucinex D  The treatment provided mild relief  The following portions of the patient's history were reviewed and updated as appropriate: allergies, current medications, past family history, past medical history, past social history, past surgical history and problem list     Review of Systems   Constitutional: Positive for chills and fever  HENT: Positive for congestion and ear pain  Respiratory: Positive for cough  Negative for shortness of breath and wheezing  Objective:      /74 (BP Location: Right arm, Patient Position: Sitting, Cuff Size: Adult)   Temp 100 °F (37 8 °C) (Tympanic)   Ht 4' 11" (1 499 m)   Wt 63 6 kg (140 lb 3 2 oz)   BMI 28 32 kg/m²          Physical Exam   Constitutional: She is oriented to person, place, and time  She appears well-developed and well-nourished  No distress  HENT:   Head: Normocephalic and atraumatic  Right Ear: Tympanic membrane, external ear and ear canal normal    Left Ear: Tympanic membrane, external ear and ear canal normal    Nose: Mucosal edema and rhinorrhea present  Mouth/Throat: Mucous membranes are normal  No oropharyngeal exudate  [place] OP   Eyes: No scleral icterus  Neck: Normal range of motion  Neck supple  Cardiovascular: Normal rate, regular rhythm and normal heart sounds  No murmur heard    Pulmonary/Chest: Effort normal and breath sounds normal  No stridor  No respiratory distress  She has no wheezes  She has no rales  Lymphadenopathy:     She has no cervical adenopathy  Neurological: She is alert and oriented to person, place, and time  Skin: Skin is warm and dry  No rash noted  She is not diaphoretic  Psychiatric: She has a normal mood and affect  Her behavior is normal  Judgment and thought content normal    Nursing note and vitals reviewed

## 2019-02-16 LAB
FLUAV AG SPEC QL: DETECTED
FLUBV AG SPEC QL: NOT DETECTED
RSV B RNA SPEC QL NAA+PROBE: NOT DETECTED

## 2019-03-22 ENCOUNTER — APPOINTMENT (OUTPATIENT)
Dept: LAB | Facility: CLINIC | Age: 84
End: 2019-03-22
Payer: COMMERCIAL

## 2019-03-22 DIAGNOSIS — E03.9 ACQUIRED HYPOTHYROIDISM: ICD-10-CM

## 2019-03-22 LAB — TSH SERPL DL<=0.05 MIU/L-ACNC: 1.31 UIU/ML (ref 0.36–3.74)

## 2019-03-22 PROCEDURE — 36415 COLL VENOUS BLD VENIPUNCTURE: CPT

## 2019-03-22 PROCEDURE — 84443 ASSAY THYROID STIM HORMONE: CPT

## 2019-05-01 DIAGNOSIS — E03.9 ACQUIRED HYPOTHYROIDISM: ICD-10-CM

## 2019-05-01 RX ORDER — LEVOTHYROXINE SODIUM 0.07 MG/1
75 TABLET ORAL DAILY
Qty: 90 TABLET | Refills: 2 | Status: SHIPPED | OUTPATIENT
Start: 2019-05-01 | End: 2020-02-05

## 2019-05-22 ENCOUNTER — OFFICE VISIT (OUTPATIENT)
Dept: FAMILY MEDICINE CLINIC | Facility: CLINIC | Age: 84
End: 2019-05-22
Payer: COMMERCIAL

## 2019-05-22 VITALS
OXYGEN SATURATION: 97 % | BODY MASS INDEX: 27.05 KG/M2 | HEART RATE: 88 BPM | DIASTOLIC BLOOD PRESSURE: 82 MMHG | WEIGHT: 134.2 LBS | SYSTOLIC BLOOD PRESSURE: 128 MMHG | HEIGHT: 59 IN | TEMPERATURE: 98.9 F

## 2019-05-22 DIAGNOSIS — I10 ESSENTIAL HYPERTENSION: ICD-10-CM

## 2019-05-22 DIAGNOSIS — E66.3 OVERWEIGHT (BMI 25.0-29.9): ICD-10-CM

## 2019-05-22 DIAGNOSIS — E03.9 ACQUIRED HYPOTHYROIDISM: Primary | ICD-10-CM

## 2019-05-22 PROCEDURE — 99214 OFFICE O/P EST MOD 30 MIN: CPT | Performed by: FAMILY MEDICINE

## 2019-05-22 PROCEDURE — 3725F SCREEN DEPRESSION PERFORMED: CPT | Performed by: FAMILY MEDICINE

## 2019-08-01 ENCOUNTER — OFFICE VISIT (OUTPATIENT)
Dept: FAMILY MEDICINE CLINIC | Facility: CLINIC | Age: 84
End: 2019-08-01
Payer: COMMERCIAL

## 2019-08-01 VITALS
HEIGHT: 59 IN | SYSTOLIC BLOOD PRESSURE: 132 MMHG | HEART RATE: 82 BPM | TEMPERATURE: 98.3 F | WEIGHT: 134 LBS | OXYGEN SATURATION: 98 % | DIASTOLIC BLOOD PRESSURE: 64 MMHG | BODY MASS INDEX: 27.01 KG/M2 | RESPIRATION RATE: 18 BRPM

## 2019-08-01 DIAGNOSIS — N39.0 URINARY TRACT INFECTION WITH HEMATURIA, SITE UNSPECIFIED: ICD-10-CM

## 2019-08-01 DIAGNOSIS — R31.9 URINARY TRACT INFECTION WITH HEMATURIA, SITE UNSPECIFIED: ICD-10-CM

## 2019-08-01 DIAGNOSIS — N34.2 INFECTIVE URETHRITIS: Primary | ICD-10-CM

## 2019-08-01 LAB
SL AMB  POCT GLUCOSE, UA: NEGATIVE
SL AMB LEUKOCYTE ESTERASE,UA: ABNORMAL
SL AMB POCT BILIRUBIN,UA: NEGATIVE
SL AMB POCT BLOOD,UA: ABNORMAL
SL AMB POCT CLARITY,UA: ABNORMAL
SL AMB POCT COLOR,UA: YELLOW
SL AMB POCT KETONES,UA: NEGATIVE
SL AMB POCT NITRITE,UA: POSITIVE
SL AMB POCT PH,UA: 0.5
SL AMB POCT SPECIFIC GRAVITY,UA: 1.01
SL AMB POCT URINE PROTEIN: ABNORMAL
SL AMB POCT UROBILINOGEN: 0.2

## 2019-08-01 PROCEDURE — 1160F RVW MEDS BY RX/DR IN RCRD: CPT | Performed by: FAMILY MEDICINE

## 2019-08-01 PROCEDURE — 81002 URINALYSIS NONAUTO W/O SCOPE: CPT | Performed by: FAMILY MEDICINE

## 2019-08-01 PROCEDURE — 3725F SCREEN DEPRESSION PERFORMED: CPT | Performed by: FAMILY MEDICINE

## 2019-08-01 PROCEDURE — 99213 OFFICE O/P EST LOW 20 MIN: CPT | Performed by: FAMILY MEDICINE

## 2019-08-01 RX ORDER — SODIUM FLUORIDE 5 MG/ML
PASTE, DENTIFRICE DENTAL DAILY
Refills: 0 | COMMUNITY
Start: 2019-07-11

## 2019-08-01 RX ORDER — NITROFURANTOIN 25; 75 MG/1; MG/1
100 CAPSULE ORAL 2 TIMES DAILY
Qty: 10 CAPSULE | Refills: 0 | Status: SHIPPED | OUTPATIENT
Start: 2019-08-01 | End: 2019-08-06

## 2019-08-01 NOTE — PROGRESS NOTES
Assessment/Plan:    No problem-specific Assessment & Plan notes found for this encounter  Diagnoses and all orders for this visit:    Infective urethritis  -     nitrofurantoin (MACROBID) 100 mg capsule; Take 1 capsule (100 mg total) by mouth 2 (two) times a day for 5 days    Urinary tract infection with hematuria, site unspecified  -     POCT urine dip    Other orders  -     PREVIDENT 5000 PLUS 1 1 % CREA          PHQ-9 Depression Screening    PHQ-9:    Frequency of the following problems over the past two weeks:       Little interest or pleasure in doing things:  0 - not at all  Feeling down, depressed, or hopeless:  0 - not at all  PHQ-2 Score:  0            Subjective:      Patient ID: Krystal Austin is a 80 y o  female  Urinary Tract Infection    This is a new problem  The current episode started 1 to 4 weeks ago  The problem occurs intermittently  The quality of the pain is described as burning  The pain is moderate  Pertinent negatives include no chills, nausea or vomiting  She has tried nothing for the symptoms  The treatment provided no relief  The following portions of the patient's history were reviewed and updated as appropriate: allergies, current medications, past family history, past medical history, past social history, past surgical history and problem list     Review of Systems   Constitutional: Negative for chills and fever  Gastrointestinal: Negative for nausea and vomiting  Genitourinary: Positive for dysuria  Objective:    /64   Pulse 82   Temp 98 3 °F (36 8 °C)   Resp 18   Ht 4' 11" (1 499 m)   Wt 60 8 kg (134 lb)   SpO2 98%   BMI 27 06 kg/m²      Physical Exam   Constitutional: She is oriented to person, place, and time  She appears well-developed and well-nourished  No distress  HENT:   Head: Normocephalic and atraumatic  Eyes: Conjunctivae are normal  No scleral icterus  Neck: Normal range of motion  Neck supple     Cardiovascular: Normal rate, regular rhythm and normal heart sounds  No murmur heard  Pulmonary/Chest: Effort normal and breath sounds normal  No respiratory distress  She has no wheezes  She has no rales  Abdominal: Soft  Bowel sounds are normal  She exhibits no distension and no mass  There is no tenderness  There is no rebound and no guarding  Musculoskeletal: She exhibits no edema  Lymphadenopathy:     She has no cervical adenopathy  Neurological: She is alert and oriented to person, place, and time  Skin: Skin is warm and dry  She is not diaphoretic  Psychiatric: She has a normal mood and affect  Her behavior is normal  Judgment and thought content normal    Nursing note and vitals reviewed

## 2019-08-09 ENCOUNTER — APPOINTMENT (OUTPATIENT)
Dept: LAB | Facility: CLINIC | Age: 84
End: 2019-08-09
Payer: COMMERCIAL

## 2019-08-09 DIAGNOSIS — N34.2 INFECTIVE URETHRITIS: Primary | ICD-10-CM

## 2019-08-09 LAB
BACTERIA UR QL AUTO: ABNORMAL /HPF
BILIRUB UR QL STRIP: NEGATIVE
CLARITY UR: ABNORMAL
COLOR UR: YELLOW
GLUCOSE UR STRIP-MCNC: NEGATIVE MG/DL
HGB UR QL STRIP.AUTO: ABNORMAL
HYALINE CASTS #/AREA URNS LPF: ABNORMAL /LPF
KETONES UR STRIP-MCNC: NEGATIVE MG/DL
LEUKOCYTE ESTERASE UR QL STRIP: ABNORMAL
NITRITE UR QL STRIP: NEGATIVE
NON-SQ EPI CELLS URNS QL MICRO: ABNORMAL /HPF
PH UR STRIP.AUTO: 6.5 [PH]
PROT UR STRIP-MCNC: NEGATIVE MG/DL
RBC #/AREA URNS AUTO: ABNORMAL /HPF
SP GR UR STRIP.AUTO: 1.01 (ref 1–1.03)
UROBILINOGEN UR QL STRIP.AUTO: 0.2 E.U./DL
WBC #/AREA URNS AUTO: ABNORMAL /HPF

## 2019-08-09 PROCEDURE — 87186 SC STD MICRODIL/AGAR DIL: CPT

## 2019-08-09 PROCEDURE — 87077 CULTURE AEROBIC IDENTIFY: CPT

## 2019-08-09 PROCEDURE — 81001 URINALYSIS AUTO W/SCOPE: CPT

## 2019-08-09 PROCEDURE — 87086 URINE CULTURE/COLONY COUNT: CPT

## 2019-08-11 LAB — BACTERIA UR CULT: ABNORMAL

## 2019-08-12 DIAGNOSIS — N34.2 INFECTIVE URETHRITIS: Primary | ICD-10-CM

## 2019-08-12 RX ORDER — NITROFURANTOIN 25; 75 MG/1; MG/1
100 CAPSULE ORAL 2 TIMES DAILY
Qty: 14 CAPSULE | Refills: 0 | Status: SHIPPED | OUTPATIENT
Start: 2019-08-12 | End: 2019-08-19

## 2019-08-12 RX ORDER — PHENAZOPYRIDINE HYDROCHLORIDE 100 MG/1
100 TABLET, FILM COATED ORAL 3 TIMES DAILY PRN
Qty: 60 TABLET | Refills: 0 | Status: SHIPPED | OUTPATIENT
Start: 2019-08-12 | End: 2020-09-14 | Stop reason: ALTCHOICE

## 2019-08-26 ENCOUNTER — APPOINTMENT (OUTPATIENT)
Dept: LAB | Facility: CLINIC | Age: 84
End: 2019-08-26
Payer: COMMERCIAL

## 2019-08-26 DIAGNOSIS — N34.2 INFECTIVE URETHRITIS: Primary | ICD-10-CM

## 2019-08-26 LAB
BACTERIA UR QL AUTO: ABNORMAL /HPF
BILIRUB UR QL STRIP: NEGATIVE
CLARITY UR: ABNORMAL
COLOR UR: YELLOW
GLUCOSE UR STRIP-MCNC: NEGATIVE MG/DL
HGB UR QL STRIP.AUTO: ABNORMAL
HYALINE CASTS #/AREA URNS LPF: ABNORMAL /LPF
KETONES UR STRIP-MCNC: NEGATIVE MG/DL
LEUKOCYTE ESTERASE UR QL STRIP: ABNORMAL
NITRITE UR QL STRIP: NEGATIVE
NON-SQ EPI CELLS URNS QL MICRO: ABNORMAL /HPF
PH UR STRIP.AUTO: 7 [PH]
PROT UR STRIP-MCNC: ABNORMAL MG/DL
RBC #/AREA URNS AUTO: ABNORMAL /HPF
SP GR UR STRIP.AUTO: 1.01 (ref 1–1.03)
UROBILINOGEN UR QL STRIP.AUTO: 0.2 E.U./DL
WBC #/AREA URNS AUTO: ABNORMAL /HPF

## 2019-08-26 PROCEDURE — 87086 URINE CULTURE/COLONY COUNT: CPT

## 2019-08-26 PROCEDURE — 81001 URINALYSIS AUTO W/SCOPE: CPT

## 2019-08-26 PROCEDURE — 87186 SC STD MICRODIL/AGAR DIL: CPT

## 2019-08-26 PROCEDURE — 87077 CULTURE AEROBIC IDENTIFY: CPT

## 2019-08-28 LAB — BACTERIA UR CULT: ABNORMAL

## 2019-09-04 ENCOUNTER — APPOINTMENT (OUTPATIENT)
Dept: LAB | Facility: CLINIC | Age: 84
End: 2019-09-04
Payer: COMMERCIAL

## 2019-09-04 DIAGNOSIS — N34.2 INFECTIVE URETHRITIS: Primary | ICD-10-CM

## 2019-09-04 DIAGNOSIS — E03.9 ACQUIRED HYPOTHYROIDISM: ICD-10-CM

## 2019-09-04 DIAGNOSIS — I10 ESSENTIAL HYPERTENSION: ICD-10-CM

## 2019-09-04 LAB
ALBUMIN SERPL BCP-MCNC: 4 G/DL (ref 3.5–5)
ALP SERPL-CCNC: 95 U/L (ref 46–116)
ALT SERPL W P-5'-P-CCNC: 28 U/L (ref 12–78)
ANION GAP SERPL CALCULATED.3IONS-SCNC: 7 MMOL/L (ref 4–13)
AST SERPL W P-5'-P-CCNC: 20 U/L (ref 5–45)
BASOPHILS # BLD AUTO: 0.05 THOUSANDS/ΜL (ref 0–0.1)
BASOPHILS NFR BLD AUTO: 1 % (ref 0–1)
BILIRUB SERPL-MCNC: 0.79 MG/DL (ref 0.2–1)
BUN SERPL-MCNC: 17 MG/DL (ref 5–25)
CALCIUM SERPL-MCNC: 9.1 MG/DL (ref 8.3–10.1)
CHLORIDE SERPL-SCNC: 99 MMOL/L (ref 100–108)
CHOLEST SERPL-MCNC: 194 MG/DL (ref 50–200)
CO2 SERPL-SCNC: 25 MMOL/L (ref 21–32)
CREAT SERPL-MCNC: 0.93 MG/DL (ref 0.6–1.3)
EOSINOPHIL # BLD AUTO: 0.12 THOUSAND/ΜL (ref 0–0.61)
EOSINOPHIL NFR BLD AUTO: 3 % (ref 0–6)
ERYTHROCYTE [DISTWIDTH] IN BLOOD BY AUTOMATED COUNT: 13.4 % (ref 11.6–15.1)
GFR SERPL CREATININE-BSD FRML MDRD: 56 ML/MIN/1.73SQ M
GLUCOSE P FAST SERPL-MCNC: 91 MG/DL (ref 65–99)
HCT VFR BLD AUTO: 40.5 % (ref 34.8–46.1)
HDLC SERPL-MCNC: 78 MG/DL (ref 40–60)
HGB BLD-MCNC: 13.2 G/DL (ref 11.5–15.4)
IMM GRANULOCYTES # BLD AUTO: 0.01 THOUSAND/UL (ref 0–0.2)
IMM GRANULOCYTES NFR BLD AUTO: 0 % (ref 0–2)
LDLC SERPL CALC-MCNC: 107 MG/DL (ref 0–100)
LYMPHOCYTES # BLD AUTO: 1.94 THOUSANDS/ΜL (ref 0.6–4.47)
LYMPHOCYTES NFR BLD AUTO: 45 % (ref 14–44)
MCH RBC QN AUTO: 30.7 PG (ref 26.8–34.3)
MCHC RBC AUTO-ENTMCNC: 32.6 G/DL (ref 31.4–37.4)
MCV RBC AUTO: 94 FL (ref 82–98)
MONOCYTES # BLD AUTO: 0.55 THOUSAND/ΜL (ref 0.17–1.22)
MONOCYTES NFR BLD AUTO: 13 % (ref 4–12)
NEUTROPHILS # BLD AUTO: 1.62 THOUSANDS/ΜL (ref 1.85–7.62)
NEUTS SEG NFR BLD AUTO: 38 % (ref 43–75)
NONHDLC SERPL-MCNC: 116 MG/DL
NRBC BLD AUTO-RTO: 0 /100 WBCS
PLATELET # BLD AUTO: 338 THOUSANDS/UL (ref 149–390)
PMV BLD AUTO: 9.2 FL (ref 8.9–12.7)
POTASSIUM SERPL-SCNC: 4.5 MMOL/L (ref 3.5–5.3)
PROT SERPL-MCNC: 7.5 G/DL (ref 6.4–8.2)
RBC # BLD AUTO: 4.3 MILLION/UL (ref 3.81–5.12)
SODIUM SERPL-SCNC: 131 MMOL/L (ref 136–145)
TRIGL SERPL-MCNC: 46 MG/DL
TSH SERPL DL<=0.05 MIU/L-ACNC: 1.37 UIU/ML (ref 0.36–3.74)
WBC # BLD AUTO: 4.29 THOUSAND/UL (ref 4.31–10.16)

## 2019-09-04 PROCEDURE — 84443 ASSAY THYROID STIM HORMONE: CPT

## 2019-09-04 PROCEDURE — 80061 LIPID PANEL: CPT

## 2019-09-04 PROCEDURE — 36415 COLL VENOUS BLD VENIPUNCTURE: CPT

## 2019-09-04 PROCEDURE — 85025 COMPLETE CBC W/AUTO DIFF WBC: CPT

## 2019-09-04 PROCEDURE — 80053 COMPREHEN METABOLIC PANEL: CPT

## 2019-09-04 RX ORDER — CIPROFLOXACIN 500 MG/1
500 TABLET, FILM COATED ORAL EVERY 12 HOURS SCHEDULED
Qty: 20 TABLET | Refills: 0 | Status: SHIPPED | OUTPATIENT
Start: 2019-09-04 | End: 2019-09-14

## 2019-09-09 ENCOUNTER — OFFICE VISIT (OUTPATIENT)
Dept: FAMILY MEDICINE CLINIC | Facility: CLINIC | Age: 84
End: 2019-09-09
Payer: COMMERCIAL

## 2019-09-09 VITALS
TEMPERATURE: 97.4 F | OXYGEN SATURATION: 98 % | WEIGHT: 136.8 LBS | HEIGHT: 59 IN | HEART RATE: 76 BPM | BODY MASS INDEX: 27.58 KG/M2 | SYSTOLIC BLOOD PRESSURE: 134 MMHG | DIASTOLIC BLOOD PRESSURE: 76 MMHG

## 2019-09-09 DIAGNOSIS — E03.9 ACQUIRED HYPOTHYROIDISM: ICD-10-CM

## 2019-09-09 DIAGNOSIS — Z13.31 NEGATIVE DEPRESSION SCREENING: ICD-10-CM

## 2019-09-09 DIAGNOSIS — E78.00 HYPERCHOLESTEROLEMIA: ICD-10-CM

## 2019-09-09 DIAGNOSIS — E87.1 HYPONATREMIA: ICD-10-CM

## 2019-09-09 DIAGNOSIS — Z00.00 MEDICARE ANNUAL WELLNESS VISIT, SUBSEQUENT: Primary | ICD-10-CM

## 2019-09-09 DIAGNOSIS — I10 ESSENTIAL HYPERTENSION: ICD-10-CM

## 2019-09-09 PROCEDURE — 99214 OFFICE O/P EST MOD 30 MIN: CPT | Performed by: FAMILY MEDICINE

## 2019-09-09 PROCEDURE — 1125F AMNT PAIN NOTED PAIN PRSNT: CPT | Performed by: FAMILY MEDICINE

## 2019-09-09 PROCEDURE — 1101F PT FALLS ASSESS-DOCD LE1/YR: CPT | Performed by: FAMILY MEDICINE

## 2019-09-09 PROCEDURE — G0439 PPPS, SUBSEQ VISIT: HCPCS | Performed by: FAMILY MEDICINE

## 2019-09-09 PROCEDURE — 1170F FXNL STATUS ASSESSED: CPT | Performed by: FAMILY MEDICINE

## 2019-09-09 NOTE — PATIENT INSTRUCTIONS
Obesity   AMBULATORY CARE:   Obesity  is when your body mass index (BMI) is greater than 30  Your healthcare provider will use your height and weight to measure your BMI  The risks of obesity include  many health problems, such as injuries or physical disability  You may need tests to check for the following:  · Diabetes     · High blood pressure or high cholesterol     · Heart disease     · Gallbladder or liver disease     · Cancer of the colon, breast, prostate, liver, or kidney     · Sleep apnea     · Arthritis or gout  Seek care immediately if:   · You have a severe headache, confusion, or difficulty speaking  · You have weakness on one side of your body  · You have chest pain, sweating, or shortness of breath  Contact your healthcare provider if:   · You have symptoms of gallbladder or liver disease, such as pain in your upper abdomen  · You have knee or hip pain and discomfort while walking  · You have symptoms of diabetes, such as intense hunger and thirst, and frequent urination  · You have symptoms of sleep apnea, such as snoring or daytime sleepiness  · You have questions or concerns about your condition or care  Treatment for obesity  focuses on helping you lose weight to improve your health  Even a small decrease in BMI can reduce the risk for many health problems  Your healthcare provider will help you set a weight-loss goal   · Lifestyle changes  are the first step in treating obesity  These include making healthy food choices and getting regular physical activity  Your healthcare provider may suggest a weight-loss program that involves coaching, education, and therapy  · Medicine  may help you lose weight when it is used with a healthy diet and physical activity  · Surgery  can help you lose weight if you are very obese and have other health problems  There are several types of weight-loss surgery  Ask your healthcare provider for more information    Be successful losing weight:   · Set small, realistic goals  An example of a small goal is to walk for 20 minutes 5 days a week  Anther goal is to lose 5% of your body weight  · Tell friends, family members, and coworkers about your goals  and ask for their support  Ask a friend to lose weight with you, or join a weight-loss support group  · Identify foods or triggers that may cause you to overeat , and find ways to avoid them  Remove tempting high-calorie foods from your home and workplace  Place a bowl of fresh fruit on your kitchen counter  If stress causes you to eat, then find other ways to cope with stress  · Keep a diary to track what you eat and drink  Also write down how many minutes of physical activity you do each day  Weigh yourself once a week and record it in your diary  Eating changes: You will need to eat 500 to 1,000 fewer calories each day than you currently eat to lose 1 to 2 pounds a week  The following changes will help you cut calories:  · Eat smaller portions  Use small plates, no larger than 9 inches in diameter  Fill your plate half full of fruits and vegetables  Measure your food using measuring cups until you know what a serving size looks like  · Eat 3 meals and 1 or 2 snacks each day  Plan your meals in advance  Macey Silence and eat at home most of the time  Eat slowly  · Eat fruits and vegetables at every meal   They are low in calories and high in fiber, which makes you feel full  Do not add butter, margarine, or cream sauce to vegetables  Use herbs to season steamed vegetables  · Eat less fat and fewer fried foods  Eat more baked or grilled chicken and fish  These protein sources are lower in calories and fat than red meat  Limit fast food  Dress your salads with olive oil and vinegar instead of bottled dressing  · Limit the amount of sugar you eat  Do not drink sugary beverages  Limit alcohol  Activity changes:  Physical activity is good for your body in many ways   It helps you burn calories and build strong muscles  It decreases stress and depression, and improves your mood  It can also help you sleep better  Talk to your healthcare provider before you begin an exercise program   · Exercise for at least 30 minutes 5 days a week  Start slowly  Set aside time each day for physical activity that you enjoy and that is convenient for you  It is best to do both weight training and an activity that increases your heart rate, such as walking, bicycling, or swimming  · Find ways to be more active  Do yard work and housecleaning  Walk up the stairs instead of using elevators  Spend your leisure time going to events that require walking, such as outdoor festivals or fairs  This extra physical activity can help you lose weight and keep it off  Follow up with your healthcare provider as directed: You may need to meet with a dietitian  Write down your questions so you remember to ask them during your visits  © 2017 [employer] Information is for End User's use only and may not be sold, redistributed or otherwise used for commercial purposes  All illustrations and images included in CareNotes® are the copyrighted property of [employer]  or Dheeraj Ruiz  The above information is an  only  It is not intended as medical advice for individual conditions or treatments  Talk to your doctor, nurse or pharmacist before following any medical regimen to see if it is safe and effective for you  Urinary Incontinence   WHAT YOU NEED TO KNOW:   What is urinary incontinence? Urinary incontinence (UI) is when you lose control of your bladder  What causes UI? UI occurs because your bladder cannot store or empty urine properly  The following are the most common types of UI:  · Stress incontinence  is when you leak urine due to increased bladder pressure  This may happen when you cough, sneeze, or exercise       · Urge incontinence  is when you feel the need to urinate right away and leak urine accidentally  · Mixed incontinence  is when you have both stress and urge UI  What are the signs and symptoms of UI?   · You feel like your bladder does not empty completely when you urinate  · You urinate often and need to urinate immediately  · You leak urine when you sleep, or you wake up with the urge to urinate  · You leak urine when you cough, sneeze, exercise, or laugh  How is UI diagnosed? Your healthcare provider will ask how often you leak urine and whether you have stress or urge symptoms  Tell him which medicines you take, how often you urinate, and how much liquid you drink each day  You may need any of the following tests:  · Urine tests  may show infection or kidney function  · A pelvic exam  may be done to check for blockages  A pelvic exam will also show if your bladder, uterus, or other organs have moved out of place  · An x-ray, ultrasound, or CT  may show problems with parts of your urinary system  You may be given contrast liquid to help your organs show up better in the pictures  Tell the healthcare provider if you have ever had an allergic reaction to contrast liquid  Do not enter the MRI room with anything metal  Metal can cause serious injury  Tell the healthcare provider if you have any metal in or on your body  · A bladder scan  will show how much urine is left in your bladder after you urinate  You will be asked to urinate and then healthcare providers will use a small ultrasound machine to check the urine left in your bladder  · Cystometry  is used to check the function of your urinary system  Your healthcare provider checks the pressure in your bladder while filling it with fluid  Your bladder pressure may also be tested when your bladder is full and while you urinate  How is UI treated? · Medicines  can help strengthen your bladder control      · Electrical stimulation  is used to send a small amount of electrical energy to your pelvic floor muscles  This helps control your bladder function  Electrodes may be placed outside your body or in your rectum  For women, the electrodes may be placed in the vagina  · A bulking agent  may be injected into the wall of your urethra to make it thicker  This helps keep your urethra closed and decreases urine leakage  · Devices  such as a clamp, pessary, or tampon may help stop urine leaks  Ask your healthcare provider for more information about these and other devices  · Surgery  may be needed if other treatments do not work  Several types of surgery can help improve your bladder control  Ask your healthcare provider for more information about the surgery you may need  How can I manage my symptoms? · Do pelvic muscle exercises often  Your pelvic muscles help you stop urinating  Squeeze these muscles tight for 5 seconds, then relax for 5 seconds  Gradually work up to squeezing for 10 seconds  Do 3 sets of 15 repetitions a day, or as directed  This will help strengthen your pelvic muscles and improve bladder control  · A catheter  may be used to help empty your bladder  A catheter is a tiny, plastic tube that is put into your bladder to drain your urine  Your healthcare provider may tell you to use a catheter to prevent your bladder from getting too full and leaking urine  · Keep a UI record  Write down how often you leak urine and how much you leak  Make a note of what you were doing when you leaked urine  · Train your bladder  Go to the bathroom at set times, such as every 2 hours, even if you do not feel the urge to go  You can also try to hold your urine when you feel the urge to go  For example, hold your urine for 5 minutes when you feel the urge to go  As that becomes easier, hold your urine for 10 minutes  · Drink liquids as directed  Ask your healthcare provider how much liquid to drink each day and which liquids are best for you   You may need to limit the amount of liquid you drink to help control your urine leakage  Limit or do not have drinks that contain caffeine or alcohol  Do not drink any liquid right before you go to bed  · Prevent constipation  Eat a variety of high-fiber foods  Good examples are high-fiber cereals, beans, vegetables, and whole-grain breads  Prune juice may help make your bowel movement softer  Walking is the best way to trigger your intestines to have a bowel movement  · Exercise regularly and maintain a healthy weight  Ask your healthcare provider how much you should weigh and about the best exercise plan for you  Weight loss and exercise will decrease pressure on your bladder and help you control your leakage  Ask him to help you create a weight loss plan if you are overweight  When should I seek immediate care? · You have severe pain  · You are confused or cannot think clearly  When should I contact my healthcare provider? · You have a fever  · You see blood in your urine  · You have pain when you urinate  · You have new or worse pain, even after treatment  · Your mouth feels dry or you have vision changes  · Your urine is cloudy or smells bad  · You have questions or concerns about your condition or care  CARE AGREEMENT:   You have the right to help plan your care  Learn about your health condition and how it may be treated  Discuss treatment options with your caregivers to decide what care you want to receive  You always have the right to refuse treatment  The above information is an  only  It is not intended as medical advice for individual conditions or treatments  Talk to your doctor, nurse or pharmacist before following any medical regimen to see if it is safe and effective for you  © 2017 [employer] Information is for End User's use only and may not be sold, redistributed or otherwise used for commercial purposes   All illustrations and images included in CareNotes® are the copyrighted property of [employer]  or Dheeraj Ruiz  Cigarette Smoking and Your Health   AMBULATORY CARE:   Risks to your health if you smoke:  Nicotine and other chemicals found in tobacco damage every cell in your body  Even if you are a light smoker, you have an increased risk for cancer, heart disease, and lung disease  If you are pregnant or have diabetes, smoking increases your risk for complications  Benefits to your health if you stop smoking:   · You decrease respiratory symptoms such as coughing, wheezing, and shortness of breath  · You reduce your risk for cancers of the lung, mouth, throat, kidney, bladder, pancreas, stomach, and cervix  If you already have cancer, you increase the benefits of chemotherapy  You also reduce your risk for cancer returning or a second cancer from developing  · You reduce your risk for heart disease, blood clots, heart attack, and stroke  · You reduce your risk for lung infections, and diseases such as pneumonia, asthma, chronic bronchitis, and emphysema  · Your circulation improves  More oxygen can be delivered to your body  If you have diabetes, you lower your risk for complications, such as kidney, artery, and eye diseases  You also lower your risk for nerve damage  Nerve damage can lead to amputations, poor vision, and blindness  · You improve your body's ability to heal and to fight infections  Benefits to the health of others if you stop smoking:  Tobacco is harmful to nonsmokers who breathe in your secondhand smoke  The following are ways the health of others around you may improve when you stop smoking:  · You lower the risks for lung cancer and heart disease in nonsmoking adults  · If you are pregnant, you lower the risk for miscarriage, early delivery, low birth weight, and stillbirth  You also lower your baby's risk for SIDS, obesity, developmental delay, and neurobehavioral problems, such as ADHD  · If you have children, you lower their risk for ear infections, colds, pneumonia, bronchitis, and asthma  For more information and support to stop smoking:   · Smokefree  gov  Phone: 0- 103 - 370-6896  Web Address: www smokefree  gov  Follow up with your healthcare provider as directed:  Write down your questions so you remember to ask them during your visits  © 2017 [address] [employer] [address] Information is for End User's use only and may not be sold, redistributed or otherwise used for commercial purposes  All illustrations and images included in CareNotes® are the copyrighted property of [employer]  or Dheeraj Ruiz  The above information is an  only  It is not intended as medical advice for individual conditions or treatments  Talk to your doctor, nurse or pharmacist before following any medical regimen to see if it is safe and effective for you  Fall Prevention   AMBULATORY CARE:   Fall prevention  includes ways to make your home and other areas safer  It also includes ways you can move more carefully to prevent a fall  Health conditions that cause changes in your blood pressure, vision, or muscle strength and coordination may increase your risk for falls  Medicines may also increase your risk for falls if they make you dizzy, weak, or sleepy  Call 911 or have someone else call if:   · You have fallen and are unconscious  · You have fallen and cannot move part of your body  Contact your healthcare provider if:   · You have fallen and have pain or a headache  · You have questions or concerns about your condition or care  Fall prevention tips:   · Stand or sit up slowly  This may help you keep your balance and prevent falls  · Use assistive devices as directed  Your healthcare provider may suggest that you use a cane or walker to help you keep your balance  You may need to have grab bars put in your bathroom near the toilet or in the shower      · Wear shoes that fit well and have soles that   Wear shoes both inside and outside  Use slippers with good   Do not wear shoes with high heels  · Wear a personal alarm  This is a device that allows you to call 911 if you fall and need help  Ask your healthcare provider for more information  · Stay active  Exercise can help strengthen your muscles and improve your balance  Your healthcare provider may recommend water aerobics or walking  He or she may also recommend physical therapy to improve your coordination  Never start an exercise program without talking to your healthcare provider first      · Manage your medical conditions  Keep all appointments with your healthcare providers  Visit your eye doctor as directed  Home safety tips:   · Add items to prevent falls in the bathroom  Put nonslip strips on your bath or shower floor to prevent you from slipping  Use a bath mat if you do not have carpet in the bathroom  This will prevent you from falling when you step out of the bath or shower  Use a shower seat so you do not need to stand while you shower  Sit on the toilet or a chair in your bathroom to dry yourself and put on clothing  This will prevent you from losing your balance from drying or dressing yourself while you are standing  · Keep paths clear  Remove books, shoes, and other objects from walkways and stairs  Place cords for telephones and lamps out of the way so that you do not need to walk over them  Tape them down if you cannot move them  Remove small rugs  If you cannot remove a rug, secure it with double-sided tape  This will prevent you from tripping  · Install bright lights in your home  Use night lights to help light paths to the bathroom or kitchen  Always turn on the light before you start walking  · Keep items you use often on shelves within reach  Do not use a step stool to help you reach an item  · Paint or place reflective tape on the edges of your stairs    This will help you see the stairs better  Follow up with your healthcare provider as directed:  Write down your questions so you remember to ask them during your visits  © 2017 [address] [employer] Information is for End User's use only and may not be sold, redistributed or otherwise used for commercial purposes  All illustrations and images included in CareNotes® are the copyrighted property of [employer]  or Dheeraj Ruiz  The above information is an  only  It is not intended as medical advice for individual conditions or treatments  Talk to your doctor, nurse or pharmacist before following any medical regimen to see if it is safe and effective for you  Advance Directives   WHAT YOU NEED TO KNOW:   What are advance directives? Advance directives are legal documents that state your wishes and plans for medical care  These plans are made ahead of time in case you lose your ability to make decisions for yourself  Advance directives can apply to any medical decision, such as the treatments you want, and if you want to donate organs  What are the types of advance directives? There are many types of advance directives, and each state has rules about how to use them  You may choose a combination of any of the following:  · Living will: This is a written record of the treatment you want  You can also choose which treatments you do not want, which to limit, and which to stop at a certain time  This includes surgery, medicine, IV fluid, and tube feedings  · Durable power of  for healthcare [place] [facility]): This is a written record that states who you want to make healthcare choices for you when you are unable to make them for yourself  This person, called a proxy, is usually a family member or a friend  You may choose more than 1 proxy  · Do not resuscitate (DNR) order:  A DNR order is used in case your heart stops beating or you stop breathing   It is a request not to have certain forms of treatment, such as CPR  A DNR order may be included in other types of advance directives  · Medical directive: This covers the care that you want if you are in a coma, near death, or unable to make decisions for yourself  You can list the treatments you want for each condition  Treatment may include pain medicine, surgery, blood transfusions, dialysis, IV or tube feedings, and a ventilator (breathing machine)  · Values history: This document has questions about your views, beliefs, and how you feel and think about life  This information can help others choose the care that you would choose  Why are advance directives important? An advance directive helps you control your care  Although spoken wishes may be used, it is better to have your wishes written down  Spoken wishes can be misunderstood, or not followed  Treatments may be given even if you do not want them  An advance directive may make it easier for your family to make difficult choices about your care  How do I decide what to put in my advance directives? · Make informed decisions:  Make sure you fully understand treatments or care you may receive  Think about the benefits and problems your decisions could cause for you or your family  Talk to healthcare providers if you have concerns or questions before you write down your wishes  You may also want to talk with your [employer] or , or a   Check your state laws to make sure that what you put in your advance directive is legal      · Sign all forms:  Sign and date your advance directive when you have finished  You may also need 2 witnesses to sign the forms  Witnesses cannot be your doctor or his staff, your spouse, heirs or beneficiaries, people you owe money to, or your chosen proxy  Talk to your family, proxy, and healthcare providers about your advance directive  Give each person a copy, and keep one for yourself in a place you can get to easily   Do not keep it hidden or locked away  · Review and revise your plans: You can revise your advance directive at any time, as long as you are able to make decisions  Review your plan every year, and when there are changes in your life, or your health  When you make changes, let your family, proxy, and healthcare providers know  Give each a new copy  Where can I find more information? · [employer]  [address] , [place]  Phone: 5- [phone] - [phone]  Phone: 9- [phone] - [phone]  Web Address: http://www  aafp org  · [address] [facility])  [address] [facility], [address] [facility] , [address] [place] [address]  Phone: 2- [phone]  Phone: [phone]  Web Address: Brianna cole  CARE AGREEMENT:   You have the right to help plan your care  To help with this plan, you must learn about your health condition and treatment options  You must also learn about advance directives and how they are used  Work with your healthcare providers to decide what care will be used to treat you  You always have the right to refuse treatment  The above information is an  only  It is not intended as medical advice for individual conditions or treatments  Talk to your doctor, nurse or pharmacist before following any medical regimen to see if it is safe and effective for you  © 2017 [facility] Information is for End User's use only and may not be sold, redistributed or otherwise used for commercial purposes  All illustrations and images included in CareNotes® are the copyrighted property of [employer]  or Dheeraj Ruiz

## 2019-09-09 NOTE — PROGRESS NOTES
Assessment/Plan:    No problem-specific Assessment & Plan notes found for this encounter  Diagnoses and all orders for this visit:    Medicare annual wellness visit, subsequent    Essential hypertension  Comments:  no change in medication    Acquired hypothyroidism    Negative depression screening    Hypercholesterolemia    Hyponatremia  Comments:  add salt to food          PHQ-9 Depression Screening    PHQ-9:    Frequency of the following problems over the past two weeks:       Little interest or pleasure in doing things:  0 - not at all  Feeling down, depressed, or hopeless:  0 - not at all  PHQ-2 Score:  0            Subjective:      Patient ID: Ailyn Strong is a 80 y o  female  Hypertension   Pertinent negatives include no chest pain, palpitations or shortness of breath  The following portions of the patient's history were reviewed and updated as appropriate: allergies, current medications, past family history, past medical history, past social history, past surgical history and problem list     Review of Systems   Constitutional: Negative for chills, fatigue and fever  HENT: Negative  Eyes: Negative  Respiratory: Negative for shortness of breath and wheezing  Cardiovascular: Negative for chest pain and palpitations  Gastrointestinal: Positive for constipation  Negative for abdominal pain, blood in stool, diarrhea, nausea and vomiting  Endocrine: Negative  Genitourinary: Negative for difficulty urinating and dysuria  Musculoskeletal: Positive for arthralgias  Negative for myalgias  Skin: Negative  Allergic/Immunologic: Negative  Neurological: Negative for seizures and syncope  Hematological: Negative for adenopathy  Psychiatric/Behavioral: Negative            Objective:    /76   Pulse 76   Temp (!) 97 4 °F (36 3 °C) (Tympanic)   Ht 4' 11" (1 499 m)   Wt 62 1 kg (136 lb 12 8 oz)   SpO2 98%   BMI 27 63 kg/m²      Physical Exam   Constitutional: She is oriented to person, place, and time  She appears well-developed and well-nourished  HENT:   Head: Normocephalic and atraumatic  Right Ear: External ear normal    Left Ear: External ear normal    Nose: Nose normal    Mouth/Throat: Oropharynx is clear and moist    Eyes: Pupils are equal, round, and reactive to light  Conjunctivae and EOM are normal    Neck: Normal range of motion  Neck supple  Cardiovascular: Normal rate, regular rhythm and normal heart sounds  No murmur heard  Pulmonary/Chest: Effort normal and breath sounds normal  No respiratory distress  She has no wheezes  She has no rales  Abdominal: Soft  Bowel sounds are normal  She exhibits no distension and no mass  There is no tenderness  There is no rebound and no guarding  Musculoskeletal: Normal range of motion  She exhibits no edema  Neurological: She is alert and oriented to person, place, and time  She has normal reflexes  She exhibits normal muscle tone  Skin: Skin is warm and dry  Psychiatric: She has a normal mood and affect  Her behavior is normal  Judgment and thought content normal    Nursing note and vitals reviewed

## 2019-09-24 ENCOUNTER — TRANSCRIBE ORDERS (OUTPATIENT)
Dept: RADIOLOGY | Facility: CLINIC | Age: 84
End: 2019-09-24

## 2019-09-24 ENCOUNTER — APPOINTMENT (OUTPATIENT)
Dept: RADIOLOGY | Facility: CLINIC | Age: 84
End: 2019-09-24
Payer: COMMERCIAL

## 2019-09-24 DIAGNOSIS — M79.671 PAIN IN BOTH FEET: Primary | ICD-10-CM

## 2019-09-24 DIAGNOSIS — M79.672 PAIN IN BOTH FEET: Primary | ICD-10-CM

## 2019-09-24 DIAGNOSIS — M79.672 PAIN IN BOTH FEET: ICD-10-CM

## 2019-09-24 DIAGNOSIS — M79.671 PAIN IN BOTH FEET: ICD-10-CM

## 2019-09-24 PROCEDURE — 73630 X-RAY EXAM OF FOOT: CPT

## 2019-10-15 DIAGNOSIS — I47.1 SVT (SUPRAVENTRICULAR TACHYCARDIA) (HCC): ICD-10-CM

## 2019-10-15 RX ORDER — METOPROLOL SUCCINATE 25 MG/1
TABLET, EXTENDED RELEASE ORAL
Qty: 45 TABLET | Refills: 3 | Status: SHIPPED | OUTPATIENT
Start: 2019-10-15 | End: 2020-09-14 | Stop reason: ALTCHOICE

## 2019-10-29 ENCOUNTER — OFFICE VISIT (OUTPATIENT)
Dept: CARDIOLOGY CLINIC | Facility: CLINIC | Age: 84
End: 2019-10-29
Payer: COMMERCIAL

## 2019-10-29 VITALS
DIASTOLIC BLOOD PRESSURE: 82 MMHG | BODY MASS INDEX: 27.5 KG/M2 | HEIGHT: 59 IN | SYSTOLIC BLOOD PRESSURE: 144 MMHG | HEART RATE: 61 BPM | WEIGHT: 136.4 LBS

## 2019-10-29 DIAGNOSIS — I47.1 SUPRAVENTRICULAR TACHYCARDIA (HCC): ICD-10-CM

## 2019-10-29 DIAGNOSIS — Z98.890 HISTORY OF RADIOFREQUENCY ABLATION (RFA) PROCEDURE FOR CARDIAC ARRHYTHMIA: ICD-10-CM

## 2019-10-29 DIAGNOSIS — E78.00 HYPERCHOLESTEROLEMIA: ICD-10-CM

## 2019-10-29 DIAGNOSIS — I25.10 CORONARY ARTERY DISEASE INVOLVING NATIVE CORONARY ARTERY OF NATIVE HEART WITHOUT ANGINA PECTORIS: Primary | ICD-10-CM

## 2019-10-29 PROCEDURE — 93000 ELECTROCARDIOGRAM COMPLETE: CPT | Performed by: INTERNAL MEDICINE

## 2019-10-29 PROCEDURE — 99214 OFFICE O/P EST MOD 30 MIN: CPT | Performed by: INTERNAL MEDICINE

## 2019-10-29 NOTE — PROGRESS NOTES
Cardiology Follow Up    [facility]  1933  [id number]  [facility]  [address] [facility] [address]  [address] [facility]  [phone]    1  Coronary artery disease involving native coronary artery of native heart without angina pectoris  POCT ECG   2  Hypercholesterolemia     3  Supraventricular tachycardia (HCC)  POCT ECG   4  History of radiofrequency ablation (RFA) procedure for cardiac arrhythmia  POCT ECG       Interval History:   Cardiology follow-up  Patient currently doing well  She continues to be very active, about 2-3 weeks ago she had an episode where she was over doing it in the yd she states he was doing a lot of physical labor  She fell chest discomfort radiating to the left arm with epigastric discomfort and her ears were ringing  She fell ill and has a lot of malaise and she felt uncomfortable several hours, in fact she has been feeling well for approximately 2 days, she did not seek medical attention on call our office  Asked not to do that in the future let us know if he had symptoms  Since that time however she has been feeling well  She is now in gauge in her normal physical activity without any symptoms  She is compliant medication on, I just learned that she was not taking her aspirin regularly  She has been intolerant and willing to take statins in the past   The last lipid profile revealed total cholesterol 194 with an HDL 70 and   His compliant with low-sodium diet and low-cholesterol diet per her own account  She is the primary caregiver for her  who is ill  She is not certain what this is symptoms are reminiscent of her previous angina      Patient Active Problem List   Diagnosis    Supraventricular tachycardia ([address] )    Coronary artery disease involving native coronary artery of native heart without angina pectoris    Hypercholesterolemia    Negative depression screening  History of radiofrequency ablation (RFA) procedure for cardiac arrhythmia     Past Medical History:   Diagnosis Date    Cardiac arrhythmia     Esophageal reflux     Hypertension     Hypothyroidism     Varicella-zoster infection      Social History     Socioeconomic History    Marital status: /Civil Union     Spouse name: Not on file    Number of children: 3    Years of education: Not on file    Highest education level: Not on file   Occupational History    Occupation: Organist for [employer]     Comment: Retired   Social Needs    Financial resource strain: Not on file    Food insecurity:     Worry: Not on file     Inability: Not on file   Storemates needs:     Medical: Not on file     Non-medical: Not on file   Tobacco Use    Smoking status: Never Smoker    Smokeless tobacco: Never Used   Substance and Sexual Activity    Alcohol use: No    Drug use: No    Sexual activity: Not on file   Lifestyle    Physical activity:     Days per week: Not on file     Minutes per session: Not on file    Stress: Not on file   Relationships    Social connections:     Talks on phone: Not on file     Gets together: Not on file     Attends [employer] service: Not on file     Active member of club or organization: Not on file     Attends meetings of clubs or organizations: Not on file     Relationship status: Not on file    Intimate partner violence:     Fear of current or ex partner: Not on file     Emotionally abused: Not on file     Physically abused: Not on file     Forced sexual activity: Not on file   Other Topics Concern    Not on file   Social History Narrative    Not on file      Family History   Problem Relation Age of Onset    Heart disease Mother     Heart disease Father     COPD Father      Past Surgical History:   Procedure Laterality Date    CHOLECYSTECTOMY      COLON SURGERY      CORONARY ANGIOPLASTY      PARTIAL HYSTERECTOMY      SMALL INTESTINE SURGERY      Partial    TONSILLECTOMY         Current Outpatient Medications:     cholecalciferol (VITAMIN D3) 1,000 units tablet, Take 5,000 Units by mouth daily , Disp: , Rfl:     Coenzyme Q10 (CO Q10) 100 MG CAPS, Take 1 capsule by mouth daily  , Disp: , Rfl:     levothyroxine 75 mcg tablet, Take 1 tablet (75 mcg total) by mouth daily, Disp: 90 tablet, Rfl: 2    metoprolol succinate (TOPROL-XL) 25 mg 24 hr tablet, TAKE 1/2 (ONE-HALF) TABLET BY MOUTH DAILY, Disp: 45 tablet, Rfl: 3    Multiple Vitamins-Minerals (RA VISION-ROCK PRESERVE PO), Take 1 tablet by mouth 2 (two) times a day , Disp: , Rfl:     Omega-3 Fatty Acids (FISH OIL PO), Take 1 capsule by mouth daily, Disp: , Rfl:     aspirin 81 MG tablet, Take 81 mg by mouth daily  , Disp: , Rfl:     fluticasone (FLONASE) 50 mcg/act nasal spray, 2 sprays into each nostril daily (Patient not taking: Reported on 10/29/2019), Disp: 16 g, Rfl: 11    phenazopyridine (PYRIDIUM) 100 mg tablet, Take 1 tablet (100 mg total) by mouth 3 (three) times a day as needed for bladder spasms (Patient not taking: Reported on 9/9/2019), Disp: 60 tablet, Rfl: 0    PREVIDENT 5000 PLUS 1 1 % CREA, daily , Disp: , Rfl: 0  Allergies   Allergen Reactions    Bextra [Valdecoxib]     Codeine Nausea Only, Headache and Tremor    Demerol [Meperidine] Nausea Only and Headache    Diovan [Valsartan]     Enalapril Cough    Lisinopril Cough    Penicillins     Percocet [Oxycodone-Acetaminophen]     Sulfa Antibiotics Abdominal Pain    Tramadol Nausea Only and Headache    Vicodin [Hydrocodone-Acetaminophen]     Zithromax [Azithromycin] Diarrhea    Zocor [Simvastatin] Hives       Labs:  Appointment on 09/04/2019   Component Date Value    WBC 09/04/2019 4 29*    RBC 09/04/2019 4 30     Hemoglobin 09/04/2019 13 2     Hematocrit 09/04/2019 40 5     MCV 09/04/2019 94     MCH 09/04/2019 30 7     MCHC 09/04/2019 32 6     RDW 09/04/2019 13 4     MPV 09/04/2019 9 2     Platelets 21/12/3825 338     BC 09/04/2019 0     Neutrophils Relative 09/04/2019 38*    Immat GRANS % 09/04/2019 0     Lymphocytes Relative 09/04/2019 45*    Monocytes Relative 09/04/2019 13*    Eosinophils Relative 09/04/2019 3     Basophils Relative 09/04/2019 1     Neutrophils Absolute 09/04/2019 1 62*    Immature Grans Absolute 09/04/2019 0 01     Lymphocytes Absolute 09/04/2019 1 94     Monocytes Absolute 09/04/2019 0 55     Eosinophils Absolute 09/04/2019 0 12     Basophils Absolute 09/04/2019 0 05     Sodium 09/04/2019 131*    Potassium 09/04/2019 4 5     Chloride 09/04/2019 99*    CO2 09/04/2019 25     ANION GAP 09/04/2019 7     BUN 09/04/2019 17     Creatinine 09/04/2019 0 93     Glucose, Fasting 09/04/2019 91     Calcium 09/04/2019 9 1     AST 09/04/2019 20     ALT 09/04/2019 28     Alkaline Phosphatase 09/04/2019 95     Total Protein 09/04/2019 7 5     Albumin 09/04/2019 4 0     Total Bilirubin 09/04/2019 0 79     eGFR 09/04/2019 56     Cholesterol 09/04/2019 194     Triglycerides 09/04/2019 46     HDL, Direct 09/04/2019 78*    LDL Calculated 09/04/2019 107*    Non-HDL-Chol (CHOL-HDL) 09/04/2019 116     TSH 3RD GENERATON 09/04/2019 1 370    Orders Only on 08/26/2019   Component Date Value    Color, UA 08/26/2019 Yellow     Clarity, UA 08/26/2019 Turbid     Specific Gravity, UA 08/26/2019 1 009     pH, UA 08/26/2019 7 0     Leukocytes, UA 08/26/2019 Large*    Nitrite, UA 08/26/2019 Negative     Protein, UA 08/26/2019 Trace*    Glucose, UA 08/26/2019 Negative     Ketones, UA 08/26/2019 Negative     Urobilinogen, UA 08/26/2019 0 2     Bilirubin, UA 08/26/2019 Negative     Blood, UA 08/26/2019 Moderate*    RBC, UA 08/26/2019 10-20*    WBC, UA 08/26/2019 Innumerable*    Epithelial Cells 08/26/2019 None Seen     Bacteria, UA 08/26/2019 Occasional     Hyaline Casts, UA 08/26/2019 None Seen     Urine Culture 08/26/2019 >100,000 cfu/ml Escherichia coli*   Orders Only on 08/09/2019   Component Date Value    Color, UA 08/09/2019 Yellow     Clarity, UA 08/09/2019 Turbid     Specific Gravity, UA 08/09/2019 1 008     pH, UA 08/09/2019 6 5     Leukocytes, UA 08/09/2019 Large*    Nitrite, UA 08/09/2019 Negative     Protein, UA 08/09/2019 Negative     Glucose, UA 08/09/2019 Negative     Ketones, UA 08/09/2019 Negative     Urobilinogen, UA 08/09/2019 0 2     Bilirubin, UA 08/09/2019 Negative     Blood, UA 08/09/2019 Small*    RBC, UA 08/09/2019 2-4*    WBC, UA 08/09/2019 Innumerable*    Epithelial Cells 08/09/2019 None Seen     Bacteria, UA 08/09/2019 Occasional     Hyaline Casts, UA 08/09/2019 None Seen     Urine Culture 08/09/2019 >100,000 cfu/ml Escherichia coli*   Office Visit on 08/01/2019   Component Date Value    LEUKOCYTE ESTERASE,UA 08/01/2019 large     NITRITE,UA 08/01/2019 positive     SL AMB POCT UROBILINOGEN 08/01/2019 0 2     POCT URINE PROTEIN 08/01/2019 trace      PH,UA 08/01/2019 0 5     BLOOD,UA 08/01/2019 moderate     SPECIFIC GRAVITY,UA 08/01/2019 1 015     KETONES,UA 08/01/2019 negative     BILIRUBIN,UA 08/01/2019 negative     GLUCOSE, UA 08/01/2019 negative      COLOR,UA 08/01/2019 yellow     CLARITY,UA 08/01/2019 cloudy      Imaging: No results found  Review of Systems:  Review of Systems   Constitutional: Negative for activity change and fatigue  HENT: Positive for nosebleeds  Respiratory: Negative for apnea, cough, shortness of breath, wheezing and stridor  Cardiovascular: Positive for chest pain  Gastrointestinal: Negative for abdominal pain and blood in stool  Endocrine: Negative for cold intolerance  Genitourinary: Negative for hematuria  Musculoskeletal: Positive for arthralgias  Negative for gait problem and myalgias  Skin: Negative for pallor and rash  Allergic/Immunologic: Negative for immunocompromised state  Neurological: Negative for dizziness and syncope  Hematological: Bruises/bleeds easily         Physical Exam:  Physical Exam   Constitutional: She appears well-developed and well-nourished  No distress  Neck: No JVD present  Cardiovascular: Normal rate, regular rhythm, normal heart sounds and intact distal pulses  Exam reveals no gallop and no friction rub  No murmur heard  Pulmonary/Chest: Effort normal and breath sounds normal  No stridor  No respiratory distress  She has no wheezes  Musculoskeletal: She exhibits no edema  Neurological: She is alert  Skin: Skin is warm and dry  Capillary refill takes less than 2 seconds  She is not diaphoretic  Psychiatric: She has a normal mood and affect  Vitals reviewed  Discussion/Summary:  Coronary disease, PTCA stent of the LAD in 2008, stress test 2013 and most recent stress test last year she did 4 minutes of Doug protocol achieving target heart rate there were no EKG changes or symptoms  Low threshold for catheterization have recurrent symptoms at the present time asked her to take at least take the aspirin every other day and let me know if she can tolerate that regimen  Today's EKG is unremarkable  This note was completed in part utilizing [employer] direct voice recognition software  Grammatical errors, random word insertion, spelling mistakes, and incomplete sentences may be an occasional consequence of the system secondary to software limitations, ambient noise and hardware issues  At the time of dictation, efforts were made to edit, clarify and /or correct errors  Please read the chart carefully and recognize, using context, where substitutions have occurred  If you have any questions or concerns about the context, text or information contained within the body of this dictation, please contact myself, the provider, for further clarification

## 2019-11-14 ENCOUNTER — APPOINTMENT (OUTPATIENT)
Dept: LAB | Facility: CLINIC | Age: 84
End: 2019-11-14
Payer: COMMERCIAL

## 2019-11-14 DIAGNOSIS — E87.1 HYPONATREMIA: ICD-10-CM

## 2019-11-14 LAB
ANION GAP SERPL CALCULATED.3IONS-SCNC: 7 MMOL/L (ref 4–13)
BUN SERPL-MCNC: 21 MG/DL (ref 5–25)
CALCIUM SERPL-MCNC: 9 MG/DL (ref 8.3–10.1)
CHLORIDE SERPL-SCNC: 101 MMOL/L (ref 100–108)
CO2 SERPL-SCNC: 28 MMOL/L (ref 21–32)
CREAT SERPL-MCNC: 1.18 MG/DL (ref 0.6–1.3)
GFR SERPL CREATININE-BSD FRML MDRD: 42 ML/MIN/1.73SQ M
GLUCOSE SERPL-MCNC: 100 MG/DL (ref 65–140)
POTASSIUM SERPL-SCNC: 4.3 MMOL/L (ref 3.5–5.3)
SODIUM SERPL-SCNC: 136 MMOL/L (ref 136–145)

## 2019-11-14 PROCEDURE — 36415 COLL VENOUS BLD VENIPUNCTURE: CPT

## 2019-11-14 PROCEDURE — 80048 BASIC METABOLIC PNL TOTAL CA: CPT

## 2019-11-22 ENCOUNTER — OFFICE VISIT (OUTPATIENT)
Dept: FAMILY MEDICINE CLINIC | Facility: CLINIC | Age: 84
End: 2019-11-22
Payer: COMMERCIAL

## 2019-11-22 VITALS
BODY MASS INDEX: 27.98 KG/M2 | OXYGEN SATURATION: 98 % | TEMPERATURE: 97.2 F | SYSTOLIC BLOOD PRESSURE: 130 MMHG | HEIGHT: 59 IN | HEART RATE: 81 BPM | DIASTOLIC BLOOD PRESSURE: 88 MMHG | WEIGHT: 138.8 LBS

## 2019-11-22 DIAGNOSIS — Z13.31 NEGATIVE DEPRESSION SCREENING: ICD-10-CM

## 2019-11-22 DIAGNOSIS — Z23 ENCOUNTER FOR IMMUNIZATION: ICD-10-CM

## 2019-11-22 DIAGNOSIS — E87.1 HYPONATREMIA: Primary | ICD-10-CM

## 2019-11-22 PROCEDURE — 1036F TOBACCO NON-USER: CPT | Performed by: FAMILY MEDICINE

## 2019-11-22 PROCEDURE — 3725F SCREEN DEPRESSION PERFORMED: CPT | Performed by: FAMILY MEDICINE

## 2019-11-22 PROCEDURE — 1160F RVW MEDS BY RX/DR IN RCRD: CPT | Performed by: FAMILY MEDICINE

## 2019-11-22 PROCEDURE — 99213 OFFICE O/P EST LOW 20 MIN: CPT | Performed by: FAMILY MEDICINE

## 2019-11-22 NOTE — PROGRESS NOTES
Assessment/Plan:    No problem-specific Assessment & Plan notes found for this encounter  Diagnoses and all orders for this visit:    Hyponatremia  -     Basic metabolic panel; Future    Encounter for immunization    Negative depression screening    Other orders  -     Cancel: influenza vaccine, [id number], high-dose, PF 0 5 mL (FLUZONE HIGH-DOSE)          PHQ-9 Depression Screening    PHQ-9:    Frequency of the following problems over the past two weeks:       Little interest or pleasure in doing things:  0 - not at all  Feeling down, depressed, or hopeless:  0 - not at all  PHQ-2 Score:  0            Subjective:      Patient ID: Homa Donaldson is a 80 y o  female  Follow up for hyponatremia, pt has been adding salt to food and now her latest BMP shows a normal sodium level      The following portions of the patient's history were reviewed and updated as appropriate: allergies, current medications, past family history, past medical history, past social history, past surgical history and problem list     Review of Systems   Respiratory: Negative for shortness of breath and wheezing  Cardiovascular: Negative for chest pain and palpitations  Neurological: Negative for dizziness  Objective:    /88   Pulse 81   Temp (!) 97 2 °F (36 2 °C) (Tympanic)   Ht 4' 11" (1 499 m)   Wt 63 kg (138 lb 12 8 oz)   SpO2 98%   BMI 28 03 kg/m²      Physical Exam   Constitutional: She is oriented to person, place, and time  She appears well-developed and well-nourished  No distress  HENT:   Head: Normocephalic and atraumatic  Eyes: Pupils are equal, round, and reactive to light  Conjunctivae and EOM are normal  No scleral icterus  Neck: Normal range of motion  Neck supple  Cardiovascular: Normal rate, regular rhythm and normal heart sounds  No murmur heard  Pulmonary/Chest: Effort normal and breath sounds normal  No respiratory distress  She has no wheezes  She has no rales  Abdominal: Soft  Bowel sounds are normal  She exhibits no distension and no mass  There is no tenderness  There is no rebound and no guarding  Musculoskeletal: She exhibits no edema  Lymphadenopathy:     She has no cervical adenopathy  Neurological: She is alert and oriented to person, place, and time  Skin: Skin is warm and dry  She is not diaphoretic  Psychiatric: She has a normal mood and affect  Her behavior is normal  Judgment and thought content normal    Nursing note and vitals reviewed

## 2020-01-13 ENCOUNTER — APPOINTMENT (OUTPATIENT)
Dept: LAB | Facility: CLINIC | Age: 85
End: 2020-01-13
Payer: COMMERCIAL

## 2020-01-13 ENCOUNTER — OFFICE VISIT (OUTPATIENT)
Dept: FAMILY MEDICINE CLINIC | Facility: CLINIC | Age: 85
End: 2020-01-13
Payer: COMMERCIAL

## 2020-01-13 VITALS
HEIGHT: 59 IN | SYSTOLIC BLOOD PRESSURE: 130 MMHG | RESPIRATION RATE: 20 BRPM | WEIGHT: 138 LBS | HEART RATE: 68 BPM | BODY MASS INDEX: 27.82 KG/M2 | OXYGEN SATURATION: 97 % | TEMPERATURE: 97.9 F | DIASTOLIC BLOOD PRESSURE: 74 MMHG

## 2020-01-13 DIAGNOSIS — R42 DIZZINESS: ICD-10-CM

## 2020-01-13 DIAGNOSIS — R42 DIZZINESS: Primary | ICD-10-CM

## 2020-01-13 DIAGNOSIS — E66.3 OVERWEIGHT (BMI 25.0-29.9): ICD-10-CM

## 2020-01-13 DIAGNOSIS — K21.9 GASTROESOPHAGEAL REFLUX DISEASE WITHOUT ESOPHAGITIS: ICD-10-CM

## 2020-01-13 DIAGNOSIS — R29.898 WEAKNESS OF BOTH LOWER EXTREMITIES: ICD-10-CM

## 2020-01-13 DIAGNOSIS — E87.1 HYPONATREMIA: ICD-10-CM
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
## 2020-01-13 LAB
ALBUMIN SERPL BCP-MCNC: 3.8 G/DL (ref 3.5–5)
ALP SERPL-CCNC: 90 U/L (ref 46–116)
ALT SERPL W P-5'-P-CCNC: 26 U/L (ref 12–78)
ANION GAP SERPL CALCULATED.3IONS-SCNC: 3 MMOL/L (ref 4–13)
AST SERPL W P-5'-P-CCNC: 20 U/L (ref 5–45)
BILIRUB SERPL-MCNC: 0.51 MG/DL (ref 0.2–1)
BUN SERPL-MCNC: 16 MG/DL (ref 5–25)
CALCIUM SERPL-MCNC: 9.1 MG/DL (ref 8.3–10.1)
CHLORIDE SERPL-SCNC: 102 MMOL/L (ref 100–108)
CO2 SERPL-SCNC: 27 MMOL/L (ref 21–32)
CREAT SERPL-MCNC: 0.95 MG/DL (ref 0.6–1.3)
GFR SERPL CREATININE-BSD FRML MDRD: 54 ML/MIN/1.73SQ M
GLUCOSE SERPL-MCNC: 97 MG/DL (ref 65–140)
POTASSIUM SERPL-SCNC: 4.5 MMOL/L (ref 3.5–5.3)
PROT SERPL-MCNC: 7.9 G/DL (ref 6.4–8.2)
SODIUM SERPL-SCNC: 132 MMOL/L (ref 136–145)
TSH SERPL DL<=0.05 MIU/L-ACNC: 1.08 UIU/ML (ref 0.36–3.74)

## 2020-01-13 PROCEDURE — 36415 COLL VENOUS BLD VENIPUNCTURE: CPT

## 2020-01-13 PROCEDURE — 1160F RVW MEDS BY RX/DR IN RCRD: CPT | Performed by: FAMILY MEDICINE

## 2020-01-13 PROCEDURE — 80053 COMPREHEN METABOLIC PANEL: CPT

## 2020-01-13 PROCEDURE — 84443 ASSAY THYROID STIM HORMONE: CPT

## 2020-01-13 PROCEDURE — 1036F TOBACCO NON-USER: CPT | Performed by: FAMILY MEDICINE

## 2020-01-13 PROCEDURE — 99214 OFFICE O/P EST MOD 30 MIN: CPT | Performed by: FAMILY MEDICINE

## 2020-01-13 RX ORDER — MECLIZINE HYDROCHLORIDE 25 MG/1
25 TABLET ORAL EVERY 8 HOURS PRN
Qty: 30 TABLET | Refills: 1 | Status: SHIPPED | OUTPATIENT
Start: 2020-01-13 | End: 2020-09-14 | Stop reason: ALTCHOICE

## 2020-01-13 NOTE — PROGRESS NOTES
Assessment/Plan:    No problem-specific Assessment & Plan notes found for this encounter  Diagnoses and all orders for this visit:    Dizziness  -     Ambulatory referral to Physical Therapy; Future  -     Comprehensive metabolic panel; Future  -     TSH, 3rd generation with Free T4 reflex; Future    Overweight (BMI 25 0-29  9)    Weakness of both lower extremities  -     Ambulatory referral to Physical Therapy; Future    Fall, initial encounter    Gastroesophageal reflux disease without esophagitis  Comments:  pepcid OTC          PHQ-9 Depression Screening    PHQ-9:    Frequency of the following problems over the past two weeks:             BMI Counseling: Body mass index is 27 87 kg/m²  The BMI is above normal  Nutrition recommendations include reducing portion sizes and 3-5 servings of fruits/vegetables daily  Exercise recommendations include moderate aerobic physical activity for 150 minutes/week and exercising 3-5 times per week  Subjective:      Patient ID: Mary Castro is a 80 y o  female  Dizziness   This is a new problem  The current episode started today  The problem occurs intermittently  The problem has been waxing and waning  Pertinent negatives include no headaches, nausea, vomiting or weakness  Pt recently fell at home trying to ambulate to the bathroom  She believes this is related getting up to quickly and to the current dizziness she is feeling  She is have weakness in extremities and is struggling to ambulate within the home  Pt would benefit from vestibular therapy and gait function and strengthening exercise  Pt is unable to leave home without assistance at this time  The following portions of the patient's history were reviewed and updated as appropriate: allergies, current medications, past family history, past medical history, past social history, past surgical history and problem list     Review of Systems   Gastrointestinal: Negative for nausea and vomiting  Neurological: Positive for dizziness  Negative for speech difficulty, weakness, light-headedness and headaches  Objective:    /74   Pulse 68   Temp 97 9 °F (36 6 °C) (Tympanic)   Resp 20   Ht 4' 11" (1 499 m)   Wt 62 6 kg (138 lb)   SpO2 97%   BMI 27 87 kg/m²      Physical Exam   Constitutional: She is oriented to person, place, and time  She appears well-developed and well-nourished  No distress  HENT:   Head: Normocephalic and atraumatic  Right Ear: External ear normal    Left Ear: External ear normal    Nose: Nose normal    Mouth/Throat: Oropharynx is clear and moist    Eyes: Pupils are equal, round, and reactive to light  Conjunctivae and EOM are normal  No scleral icterus  Neck: Normal range of motion  Neck supple  Cardiovascular: Normal rate, regular rhythm and normal heart sounds  No murmur heard  Pulmonary/Chest: Effort normal and breath sounds normal  No respiratory distress  She has no wheezes  She has no rales  Musculoskeletal: She exhibits no edema  Lymphadenopathy:     She has no cervical adenopathy  Neurological: She is alert and oriented to person, place, and time  Skin: Skin is warm and dry  She is not diaphoretic  Psychiatric: She has a normal mood and affect  Her behavior is normal  Judgment and thought content normal    Nursing note and vitals reviewed

## 2020-01-22 ENCOUNTER — TELEPHONE (OUTPATIENT)
Dept: FAMILY MEDICINE CLINIC | Facility: CLINIC | Age: 85
End: 2020-01-22

## 2020-01-22 NOTE — TELEPHONE ENCOUNTER
Zoila from [facility] physical therapy called stated some one will be going out to see mildred 1/23/2020

## 2020-02-03 DIAGNOSIS — E03.9 ACQUIRED HYPOTHYROIDISM: ICD-10-CM

## 2020-02-05 RX ORDER — LEVOTHYROXINE SODIUM 0.07 MG/1
TABLET ORAL
Qty: 90 TABLET | Refills: 0 | Status: SHIPPED | OUTPATIENT
Start: 2020-02-05 | End: 2020-03-23

## 2020-03-23 DIAGNOSIS — E03.9 ACQUIRED HYPOTHYROIDISM: ICD-10-CM

## 2020-03-23 RX ORDER — LEVOTHYROXINE SODIUM 0.07 MG/1
TABLET ORAL
Qty: 90 TABLET | Refills: 3 | Status: SHIPPED | OUTPATIENT
Start: 2020-03-23 | End: 2021-03-17 | Stop reason: SDUPTHER

## 2020-05-06 DIAGNOSIS — E03.9 ACQUIRED HYPOTHYROIDISM: Primary | ICD-10-CM

## 2020-06-05 ENCOUNTER — TRANSCRIBE ORDERS (OUTPATIENT)
Dept: FAMILY MEDICINE CLINIC | Facility: CLINIC | Age: 85
End: 2020-06-05

## 2020-06-05 DIAGNOSIS — R42 DIZZINESS: ICD-10-CM

## 2020-06-05 DIAGNOSIS — E78.00 HYPERCHOLESTEROLEMIA: Primary | ICD-10-CM

## 2020-06-05 DIAGNOSIS — E03.9 ACQUIRED HYPOTHYROIDISM: ICD-10-CM

## 2020-06-05 DIAGNOSIS — I10 ESSENTIAL HYPERTENSION: ICD-10-CM

## 2020-07-21 ENCOUNTER — APPOINTMENT (OUTPATIENT)
Dept: LAB | Facility: CLINIC | Age: 85
End: 2020-07-21
Payer: COMMERCIAL

## 2020-07-21 DIAGNOSIS — E78.00 HYPERCHOLESTEROLEMIA: ICD-10-CM

## 2020-07-21 DIAGNOSIS — I10 ESSENTIAL HYPERTENSION: ICD-10-CM

## 2020-07-21 DIAGNOSIS — E03.9 ACQUIRED HYPOTHYROIDISM: ICD-10-CM

## 2020-07-21 DIAGNOSIS — R42 DIZZINESS: ICD-10-CM

## 2020-07-21 LAB
ALBUMIN SERPL BCP-MCNC: 3.5 G/DL (ref 3.5–5)
ALP SERPL-CCNC: 78 U/L (ref 46–116)
ALT SERPL W P-5'-P-CCNC: 27 U/L (ref 12–78)
ANION GAP SERPL CALCULATED.3IONS-SCNC: 5 MMOL/L (ref 4–13)
AST SERPL W P-5'-P-CCNC: 21 U/L (ref 5–45)
BASOPHILS # BLD AUTO: 0.04 THOUSANDS/ΜL (ref 0–0.1)
BASOPHILS NFR BLD AUTO: 1 % (ref 0–1)
BILIRUB SERPL-MCNC: 0.74 MG/DL (ref 0.2–1)
BUN SERPL-MCNC: 16 MG/DL (ref 5–25)
CALCIUM SERPL-MCNC: 8.8 MG/DL (ref 8.3–10.1)
CHLORIDE SERPL-SCNC: 104 MMOL/L (ref 100–108)
CHOLEST SERPL-MCNC: 189 MG/DL (ref 50–200)
CO2 SERPL-SCNC: 24 MMOL/L (ref 21–32)
CREAT SERPL-MCNC: 0.95 MG/DL (ref 0.6–1.3)
EOSINOPHIL # BLD AUTO: 0.17 THOUSAND/ΜL (ref 0–0.61)
EOSINOPHIL NFR BLD AUTO: 4 % (ref 0–6)
ERYTHROCYTE [DISTWIDTH] IN BLOOD BY AUTOMATED COUNT: 12.6 % (ref 11.6–15.1)
GFR SERPL CREATININE-BSD FRML MDRD: 54 ML/MIN/1.73SQ M
GLUCOSE P FAST SERPL-MCNC: 98 MG/DL (ref 65–99)
HCT VFR BLD AUTO: 39.9 % (ref 34.8–46.1)
HDLC SERPL-MCNC: 73 MG/DL
HGB BLD-MCNC: 13.3 G/DL (ref 11.5–15.4)
IMM GRANULOCYTES # BLD AUTO: 0.01 THOUSAND/UL (ref 0–0.2)
IMM GRANULOCYTES NFR BLD AUTO: 0 % (ref 0–2)
LDLC SERPL CALC-MCNC: 105 MG/DL (ref 0–100)
LYMPHOCYTES # BLD AUTO: 2.06 THOUSANDS/ΜL (ref 0.6–4.47)
LYMPHOCYTES NFR BLD AUTO: 48 % (ref 14–44)
MCH RBC QN AUTO: 31.3 PG (ref 26.8–34.3)
MCHC RBC AUTO-ENTMCNC: 33.3 G/DL (ref 31.4–37.4)
MCV RBC AUTO: 94 FL (ref 82–98)
MONOCYTES # BLD AUTO: 0.47 THOUSAND/ΜL (ref 0.17–1.22)
MONOCYTES NFR BLD AUTO: 11 % (ref 4–12)
NEUTROPHILS # BLD AUTO: 1.57 THOUSANDS/ΜL (ref 1.85–7.62)
NEUTS SEG NFR BLD AUTO: 36 % (ref 43–75)
NONHDLC SERPL-MCNC: 116 MG/DL
NRBC BLD AUTO-RTO: 0 /100 WBCS
PLATELET # BLD AUTO: 315 THOUSANDS/UL (ref 149–390)
PMV BLD AUTO: 9.3 FL (ref 8.9–12.7)
POTASSIUM SERPL-SCNC: 4 MMOL/L (ref 3.5–5.3)
PROT SERPL-MCNC: 7.5 G/DL (ref 6.4–8.2)
RBC # BLD AUTO: 4.25 MILLION/UL (ref 3.81–5.12)
SODIUM SERPL-SCNC: 133 MMOL/L (ref 136–145)
T4 FREE SERPL-MCNC: 1.55 NG/DL (ref 0.76–1.46)
TRIGL SERPL-MCNC: 57 MG/DL
TSH SERPL DL<=0.05 MIU/L-ACNC: 0.17 UIU/ML (ref 0.36–3.74)
WBC # BLD AUTO: 4.32 THOUSAND/UL (ref 4.31–10.16)

## 2020-07-21 PROCEDURE — 80061 LIPID PANEL: CPT

## 2020-07-21 PROCEDURE — 80053 COMPREHEN METABOLIC PANEL: CPT

## 2020-07-21 PROCEDURE — 36415 COLL VENOUS BLD VENIPUNCTURE: CPT

## 2020-07-21 PROCEDURE — 84443 ASSAY THYROID STIM HORMONE: CPT

## 2020-07-21 PROCEDURE — 84439 ASSAY OF FREE THYROXINE: CPT

## 2020-07-21 PROCEDURE — 85025 COMPLETE CBC W/AUTO DIFF WBC: CPT

## 2020-09-14 ENCOUNTER — OFFICE VISIT (OUTPATIENT)
Dept: FAMILY MEDICINE CLINIC | Facility: CLINIC | Age: 85
End: 2020-09-14
Payer: COMMERCIAL

## 2020-09-14 VITALS
DIASTOLIC BLOOD PRESSURE: 84 MMHG | HEART RATE: 62 BPM | BODY MASS INDEX: 26.61 KG/M2 | WEIGHT: 132 LBS | OXYGEN SATURATION: 98 % | SYSTOLIC BLOOD PRESSURE: 130 MMHG | TEMPERATURE: 97.6 F | HEIGHT: 59 IN

## 2020-09-14 DIAGNOSIS — E03.9 ACQUIRED HYPOTHYROIDISM: ICD-10-CM

## 2020-09-14 DIAGNOSIS — E87.1 HYPONATREMIA: ICD-10-CM

## 2020-09-14 DIAGNOSIS — Z00.00 MEDICARE ANNUAL WELLNESS VISIT, SUBSEQUENT: Primary | ICD-10-CM

## 2020-09-14 DIAGNOSIS — Z13.31 NEGATIVE DEPRESSION SCREENING: ICD-10-CM

## 2020-09-14 DIAGNOSIS — M81.0 AGE RELATED OSTEOPOROSIS, UNSPECIFIED PATHOLOGICAL FRACTURE PRESENCE: ICD-10-CM

## 2020-09-14 PROCEDURE — 1036F TOBACCO NON-USER: CPT | Performed by: FAMILY MEDICINE

## 2020-09-14 PROCEDURE — G0439 PPPS, SUBSEQ VISIT: HCPCS | Performed by: FAMILY MEDICINE

## 2020-09-14 PROCEDURE — 1170F FXNL STATUS ASSESSED: CPT | Performed by: FAMILY MEDICINE

## 2020-09-14 PROCEDURE — 1160F RVW MEDS BY RX/DR IN RCRD: CPT | Performed by: FAMILY MEDICINE

## 2020-09-14 PROCEDURE — 3288F FALL RISK ASSESSMENT DOCD: CPT | Performed by: FAMILY MEDICINE

## 2020-09-14 PROCEDURE — 3725F SCREEN DEPRESSION PERFORMED: CPT | Performed by: FAMILY MEDICINE

## 2020-09-14 PROCEDURE — 99214 OFFICE O/P EST MOD 30 MIN: CPT | Performed by: FAMILY MEDICINE

## 2020-09-14 PROCEDURE — 1101F PT FALLS ASSESS-DOCD LE1/YR: CPT | Performed by: FAMILY MEDICINE

## 2020-09-14 PROCEDURE — 1125F AMNT PAIN NOTED PAIN PRSNT: CPT | Performed by: FAMILY MEDICINE

## 2020-09-14 NOTE — PATIENT INSTRUCTIONS
Medicare Preventive Visit Patient Instructions  Thank you for completing your Welcome to Medicare Visit or Medicare Annual Wellness Visit today  Your next wellness visit will be due in one year (9/14/2021)  The screening/preventive services that you may require over the next 5-10 years are detailed below  Some tests may not apply to you based off risk factors and/or age  Screening tests ordered at today's visit but not completed yet may show as past due  Also, please note that scanned in results may not display below  Preventive Screenings:  Service Recommendations Previous Testing/Comments   Colorectal Cancer Screening  * Colonoscopy    * Fecal Occult Blood Test (FOBT)/Fecal Immunochemical Test (FIT)  * Fecal DNA/Cologuard Test  * Flexible Sigmoidoscopy Age: 54-65 years old   Colonoscopy: every 10 years (may be performed more frequently if at higher risk)  OR  FOBT/FIT: every 1 year  OR  Cologuard: every 3 years  OR  Sigmoidoscopy: every 5 years  Screening may be recommended earlier than age 48 if at higher risk for colorectal cancer  Also, an individualized decision between you and your healthcare provider will decide whether screening between the ages of 74-80 would be appropriate  Colonoscopy: 10/12/2010  FOBT/FIT: Not on file  Cologuard: Not on file  Sigmoidoscopy: Not on file    Screening Not Indicated     Breast Cancer Screening Age: 36 years old  Frequency: every 1-2 years  Not required if history of left and right mastectomy Mammogram: Not on file    Patient Declines   Cervical Cancer Screening Between the ages of 21-29, pap smear recommended once every 3 years  Between the ages of 33-67, can perform pap smear with HPV co-testing every 5 years     Recommendations may differ for women with a history of total hysterectomy, cervical cancer, or abnormal pap smears in past  Pap Smear: Not on file    Screening Not Indicated   Hepatitis C Screening Once for adults born between 1945 and 1965  More frequently in patients at high risk for Hepatitis C Hep C Antibody: Not on file    Screening Not Indicated   Diabetes Screening 1-2 times per year if you're at risk for diabetes or have pre-diabetes Fasting glucose: 98 mg/dL   A1C: No results in last 5 years    Screening Current   Cholesterol Screening Once every 5 years if you don't have a lipid disorder  May order more often based on risk factors  Lipid panel: 07/21/2020    Screening Not Indicated  History Lipid Disorder     Other Preventive Screenings Covered by Medicare:  1  Abdominal Aortic Aneurysm (AAA) Screening: covered once if your at risk  You're considered to be at risk if you have a family history of AAA  2  Lung Cancer Screening: covers low dose CT scan once per year if you meet all of the following conditions: (1) Age 50-69; (2) No signs or symptoms of lung cancer; (3) Current smoker or have quit smoking within the last 15 years; (4) You have a tobacco smoking history of at least 30 pack years (packs per day multiplied by number of years you smoked); (5) You get a written order from a healthcare provider  3  Glaucoma Screening: covered annually if you're considered high risk: (1) You have diabetes OR (2) Family history of glaucoma OR (3)  aged 48 and older OR (3)  American aged 72 and older  3  Osteoporosis Screening: covered every 2 years if you meet one of the following conditions: (1) You're estrogen deficient and at risk for osteoporosis based off medical history and other findings; (2) Have a vertebral abnormality; (3) On glucocorticoid therapy for more than 3 months; (4) Have primary hyperparathyroidism; (5) On osteoporosis medications and need to assess response to drug therapy  · Last bone density test (DXA Scan): 08/10/2016   5  HIV Screening: covered annually if you're between the age of 15-65  Also covered annually if you are younger than 13 and older than 72 with risk factors for HIV infection   For pregnant patients, it is covered up to 3 times per pregnancy  Immunizations:  Immunization Recommendations   Influenza Vaccine Annual influenza vaccination during flu season is recommended for all persons aged >= 6 months who do not have contraindications   Pneumococcal Vaccine (Prevnar and Pneumovax)  * Prevnar = PCV13  * Pneumovax = PPSV23   Adults 25-60 years old: 1-3 doses may be recommended based on certain risk factors  Adults 72 years old: Prevnar (PCV13) vaccine recommended followed by Pneumovax (PPSV23) vaccine  If already received PPSV23 since turning 65, then PCV13 recommended at least one year after PPSV23 dose  Hepatitis B Vaccine 3 dose series if at intermediate or high risk (ex: diabetes, end stage renal disease, liver disease)   Tetanus (Td) Vaccine - COST NOT COVERED BY MEDICARE PART B Following completion of primary series, a booster dose should be given every 10 years to maintain immunity against tetanus  Td may also be given as tetanus wound prophylaxis  Tdap Vaccine - COST NOT COVERED BY MEDICARE PART B Recommended at least once for all adults  For pregnant patients, recommended with each pregnancy  Shingles Vaccine (Shingrix) - COST NOT COVERED BY MEDICARE PART B  2 shot series recommended in those aged 48 and above     Health Maintenance Due:  There are no preventive care reminders to display for this patient  Immunizations Due:      Topic Date Due    Influenza Vaccine  07/01/2020     Advance Directives   What are advance directives? Advance directives are legal documents that state your wishes and plans for medical care  These plans are made ahead of time in case you lose your ability to make decisions for yourself  Advance directives can apply to any medical decision, such as the treatments you want, and if you want to donate organs  What are the types of advance directives? There are many types of advance directives, and each state has rules about how to use them   You may choose a combination of any of the following:  · Living will: This is a written record of the treatment you want  You can also choose which treatments you do not want, which to limit, and which to stop at a certain time  This includes surgery, medicine, IV fluid, and tube feedings  · Durable power of  for healthcare [facility]): This is a written record that states who you want to make healthcare choices for you when you are unable to make them for yourself  This person, called a proxy, is usually a family member or a friend  You may choose more than 1 proxy  · Do not resuscitate (DNR) order:  A DNR order is used in case your heart stops beating or you stop breathing  It is a request not to have certain forms of treatment, such as CPR  A DNR order may be included in other types of advance directives  · Medical directive: This covers the care that you want if you are in a coma, near death, or unable to make decisions for yourself  You can list the treatments you want for each condition  Treatment may include pain medicine, surgery, blood transfusions, dialysis, IV or tube feedings, and a ventilator (breathing machine)  · Values history: This document has questions about your views, beliefs, and how you feel and think about life  This information can help others choose the care that you would choose  Why are advance directives important? An advance directive helps you control your care  Although spoken wishes may be used, it is better to have your wishes written down  Spoken wishes can be misunderstood, or not followed  Treatments may be given even if you do not want them  An advance directive may make it easier for your family to make difficult choices about your care  Urinary Incontinence   Urinary incontinence (UI)  is when you lose control of your bladder  UI develops because your bladder cannot store or empty urine properly  The 3 most common types of UI are stress incontinence, urge incontinence, or both    Medicines: · May be given to help strengthen your bladder control  Report any side effects of medication to your healthcare provider  Do pelvic muscle exercises often:  Your pelvic muscles help you stop urinating  Squeeze these muscles tight for 5 seconds, then relax for 5 seconds  Gradually work up to squeezing for 10 seconds  Do 3 sets of 15 repetitions a day, or as directed  This will help strengthen your pelvic muscles and improve bladder control  Train your bladder:  Go to the bathroom at set times, such as every 2 hours, even if you do not feel the urge to go  You can also try to hold your urine when you feel the urge to go  For example, hold your urine for 5 minutes when you feel the urge to go  As that becomes easier, hold your urine for 10 minutes  Self-care:   · Keep a UI record  Write down how often you leak urine and how much you leak  Make a note of what you were doing when you leaked urine  · Drink liquids as directed  You may need to limit the amount of liquid you drink to help control your urine leakage  Do not drink any liquid right before you go to bed  Limit or do not have drinks that contain caffeine or alcohol  · Prevent constipation  Eat a variety of high-fiber foods  Good examples are high-fiber cereals, beans, vegetables, and whole-grain breads  Walking is the best way to trigger your intestines to have a bowel movement  · Exercise regularly and maintain a healthy weight  Weight loss and exercise will decrease pressure on your bladder and help you control your leakage  · Use a catheter as directed  to help empty your bladder  A catheter is a tiny, plastic tube that is put into your bladder to drain your urine  · Go to behavior therapy as directed  Behavior therapy may be used to help you learn to control your urge to urinate      Weight Management   Why it is important to manage your weight:  Being overweight increases your risk of health conditions such as heart disease, high blood pressure, type 2 diabetes, and certain types of cancer  It can also increase your risk for osteoarthritis, sleep apnea, and other respiratory problems  Aim for a slow, steady weight loss  Even a small amount of weight loss can lower your risk of health problems  How to lose weight safely:  A safe and healthy way to lose weight is to eat fewer calories and get regular exercise  You can lose up about 1 pound a week by decreasing the number of calories you eat by 500 calories each day  Healthy meal plan for weight management:  A healthy meal plan includes a variety of foods, contains fewer calories, and helps you stay healthy  A healthy meal plan includes the following:  · Eat whole-grain foods more often  A healthy meal plan should contain fiber  Fiber is the part of grains, fruits, and vegetables that is not broken down by your body  Whole-grain foods are healthy and provide extra fiber in your diet  Some examples of whole-grain foods are whole-wheat breads and pastas, oatmeal, brown rice, and bulgur  · Eat a variety of vegetables every day  Include dark, leafy greens such as spinach, kale, elana greens, and mustard greens  Eat yellow and orange vegetables such as carrots, sweet potatoes, and winter squash  · Eat a variety of fruits every day  Choose fresh or canned fruit (canned in its own juice or light syrup) instead of juice  Fruit juice has very little or no fiber  · Eat low-fat dairy foods  Drink fat-free (skim) milk or 1% milk  Eat fat-free yogurt and low-fat cottage cheese  Try low-fat cheeses such as mozzarella and other reduced-fat cheeses  · Choose meat and other protein foods that are low in fat  Choose beans or other legumes such as split peas or lentils  Choose fish, skinless poultry (chicken or turkey), or lean cuts of red meat (beef or pork)  Before you cook meat or poultry, cut off any visible fat  · Use less fat and oil  Try baking foods instead of frying them   Add less fat, such as margarine, sour cream, regular salad dressing and mayonnaise to foods  Eat fewer high-fat foods  Some examples of high-fat foods include french fries, doughnuts, ice cream, and cakes  · Eat fewer sweets  Limit foods and drinks that are high in sugar  This includes candy, cookies, regular soda, and sweetened drinks  Exercise:  Exercise at least 30 minutes per day on most days of the week  Some examples of exercise include walking, biking, dancing, and swimming  You can also fit in more physical activity by taking the stairs instead of the elevator or parking farther away from stores  Ask your healthcare provider about the best exercise plan for you  © Copyright [employer] 2018 Information is for End User's use only and may not be sold, redistributed or otherwise used for commercial purposes   All illustrations and images included in CareNotes® are the copyrighted property of [employer]  or [address] [place] [address]

## 2020-09-14 NOTE — PROGRESS NOTES
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, unspecified pathological fracture presence  -     DXA bone density spine hip and pelvis; Future    Negative depression screening    Hyponatremia  Comments:  stable            PHQ-9 Depression Screening    PHQ-9:    Frequency of the following problems over the past two weeks:       Little interest or pleasure in doing things:  0 - not at all  Feeling down, depressed, or hopeless:  0 - not at all  PHQ-2 Score:  0            Subjective:      Patient ID: Kristin Esparza is a 80 y o  female  Thyroid Problem   Presents for follow-up visit  Symptoms include fatigue  Patient reports no anxiety, cold intolerance, constipation, depressed mood, diaphoresis, diarrhea, dry skin, hair loss, heat intolerance, leg swelling, palpitations, visual change, weight gain or weight loss  The following portions of the patient's history were reviewed and updated as appropriate: allergies, current medications, past family history, past medical history, past social history, past surgical history and problem list     Review of Systems   Constitutional: Positive for fatigue  Negative for chills, diaphoresis, fever, weight gain and weight loss  HENT: Negative  Eyes: Negative  Respiratory: Negative for shortness of breath and wheezing  Cardiovascular: Negative for chest pain and palpitations  Gastrointestinal: Negative for abdominal pain, blood in stool, constipation, diarrhea, nausea and vomiting  Endocrine: Negative  Negative for cold intolerance and heat intolerance  Genitourinary: Negative for difficulty urinating and dysuria  Musculoskeletal: Negative for arthralgias and myalgias  Skin: Negative  Allergic/Immunologic: Negative  Neurological: Negative for seizures and syncope  Hematological: Negative for adenopathy  Psychiatric/Behavioral: Negative  The patient is not nervous/anxious  Objective:    /84   Pulse 62   Temp 97 6 °F (36 4 °C)   Ht 4' 11" (1 499 m)   Wt 59 9 kg (132 lb)   SpO2 98%   BMI 26 66 kg/m²      Physical Exam  Vitals signs and nursing note reviewed  Constitutional:       General: She is not in acute distress  Appearance: Normal appearance  She is well-developed  She is not ill-appearing, toxic-appearing or diaphoretic  HENT:      Head: Normocephalic and atraumatic  Right Ear: Tympanic membrane, ear canal and external ear normal  There is no impacted cerumen  Left Ear: Tympanic membrane, ear canal and external ear normal  There is no impacted cerumen  Nose: Nose normal  No congestion or rhinorrhea  Mouth/Throat:      Mouth: Mucous membranes are moist       Pharynx: No oropharyngeal exudate or posterior oropharyngeal erythema  Eyes:      Conjunctiva/sclera: Conjunctivae normal       Pupils: Pupils are equal, round, and reactive to light  Neck:      Musculoskeletal: Normal range of motion and neck supple  No neck rigidity  Cardiovascular:      Rate and Rhythm: Normal rate and regular rhythm  Heart sounds: Normal heart sounds  No murmur  Pulmonary:      Effort: Pulmonary effort is normal  No respiratory distress  Breath sounds: Normal breath sounds  No wheezing or rales  Abdominal:      General: Bowel sounds are normal  There is no distension  Palpations: Abdomen is soft  There is no mass  Tenderness: There is no abdominal tenderness  There is no guarding or rebound  Musculoskeletal: Normal range of motion  Right lower leg: No edema  Left lower leg: No edema  Lymphadenopathy:      Cervical: No cervical adenopathy  Skin:     General: Skin is warm and dry     Neurological:      General: No focal deficit present  Mental Status: She is alert and oriented to person, place, and time  Sensory: No sensory deficit  Motor: No weakness or abnormal muscle tone  Coordination: Coordination normal       Gait: Gait normal       Deep Tendon Reflexes: Reflexes are normal and symmetric  Psychiatric:         Mood and Affect: Mood normal          Behavior: Behavior normal          Thought Content:  Thought content normal          Judgment: Judgment normal

## 2020-09-14 NOTE — PROGRESS NOTES
Assessment and Plan:     Problem List Items Addressed This Visit        Other    Medicare annual wellness visit, subsequent - Primary      Other Visit Diagnoses     Age related osteoporosis, unspecified pathological fracture presence               Preventive health issues were discussed with patient, and age appropriate screening tests were ordered as noted in patient's After Visit Summary  Personalized health advice and appropriate referrals for health education or preventive services given if needed, as noted in patient's After Visit Summary  History of Present Illness:     Patient presents for Medicare Annual Wellness visit    Patient Care Team:  Muna Sethi DO as PCP - DO Merly Rodriguez MD Levorn Handy, DO Esperanza Dew, MD     Problem List:     Patient Active Problem List   Diagnosis    Supraventricular tachycardia ([address] )    Coronary artery disease involving native coronary artery of native heart without angina pectoris    Hypercholesterolemia    Negative depression screening    History of radiofrequency ablation (RFA) procedure for cardiac arrhythmia    Overweight (BMI 25 0-29  9)    Medicare annual wellness visit, subsequent      Past Medical and Surgical History:     Past Medical History:   Diagnosis Date    Cardiac arrhythmia     Esophageal reflux     Hypertension     Hypothyroidism     Varicella-zoster infection      Past Surgical History:   Procedure Laterality Date    CHOLECYSTECTOMY      COLON SURGERY      CORONARY ANGIOPLASTY      PARTIAL HYSTERECTOMY      SMALL INTESTINE SURGERY      Partial    TONSILLECTOMY        Family History:     Family History   Problem Relation Age of Onset    Heart disease Mother     Heart disease Father     COPD Father       Social History:     E-Cigarette/Vaping    E-Cigarette Use Never User      E-Cigarette/Vaping Substances    Nicotine No     THC No     CBD No     Flavoring No     Other No     Unknown No Social History     Socioeconomic History    Marital status: /Civil Union     Spouse name: None    Number of children: 3    Years of education: None    Highest education level: None   Occupational History    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[place]     Comment: Retired   Social Needs    Financial resource strain: None    Food insecurity     Worry: None     Inability: None    Transportation needs     Medical: None     Non-medical: None   Tobacco Use    Smoking status: Never Smoker    Smokeless tobacco: Never Used   Substance and Sexual Activity    Alcohol use: No    Drug use: No    Sexual activity: None   Lifestyle    Physical activity     Days per week: None     Minutes per session: None    Stress: None   Relationships    Social connections     Talks on phone: None     Gets together: None     Attends Holiness service: None     Active member of club or organization: None     Attends meetings of clubs or organizations: None     Relationship status: None    Intimate partner violence     Fear of current or ex partner: None     Emotionally abused: None     Physically abused: None     Forced sexual activity: None   Other Topics Concern    None   Social History Narrative    No caffeine use      Medications and Allergies:     Current Outpatient Medications   Medication Sig Dispense Refill    aspirin 81 MG tablet Take 81 mg by mouth daily   cholecalciferol (VITAMIN D3) 1,000 units tablet Take 5,000 Units by mouth daily       Coenzyme Q10 (CO Q10) 100 MG CAPS Take 1 capsule by mouth daily   levothyroxine 75 mcg tablet Take 1 tablet by mouth once daily 90 tablet 3    Omega-3 Fatty Acids (FISH OIL PO) Take 1 capsule by mouth daily      PREVIDENT 5000 PLUS 1 1 % CREA daily   0    Multiple Vitamins-Minerals (RA VISION-ROCK PRESERVE PO) Take 1 tablet by mouth 2 (two) times a day        No current facility-administered medications for this visit        Allergies   Allergen Reactions    Bextra [Valdecoxib]     Codeine Nausea Only, Headache and Tremor    Demerol [Meperidine] Nausea Only and Headache    Diovan [Valsartan]     Enalapril Cough    Lisinopril Cough    Penicillins     Percocet [Oxycodone-Acetaminophen]     Sulfa Antibiotics Abdominal Pain    Tramadol Nausea Only and Headache    Vicodin [Hydrocodone-Acetaminophen]     Zithromax [Azithromycin] Diarrhea    Zocor [Simvastatin] Hives      Immunizations:     Immunization History   Administered Date(s) Administered    DT (pediatric) 06/07/2001    INFLUENZA 12/12/2017    Influenza TIV (IM) 10/20/2010    Pneumococcal Conjugate 13-Valent 02/27/2018    Pneumococcal Polysaccharide PPV23 02/09/2007    Tdap 09/09/2015      Health Maintenance: There are no preventive care reminders to display for this patient  Topic Date Due    Influenza Vaccine  07/01/2020      Medicare Health Risk Assessment:     /84   Pulse 62   Temp 97 6 °F (36 4 °C)   Ht 4' 11" (1 499 m)   Wt 59 9 kg (132 lb)   SpO2 98%   BMI 26 66 kg/m²      Mateo Hardin is here for her Subsequent Wellness visit  Last Medicare Wellness visit information reviewed, patient interviewed and updates made to the record today  Health Risk Assessment:   Patient rates overall health as good  Patient feels that their physical health rating is slightly better  Eyesight was rated as same  Hearing was rated as same  Patient feels that their emotional and mental health rating is same  Pain experienced in the last 7 days has been none  Patient states that she has experienced no weight loss or gain in last 6 months  Depression Screening:   PHQ-2 Score: 0      Fall Risk Screening: In the past year, patient has experienced: no history of falling in past year      Urinary Incontinence Screening:   Patient has leaked urine accidently in the last six months  Home Safety:  Patient does not have trouble with stairs inside or outside of their home   Patient has working smoke alarms and has working carbon monoxide detector  Home safety hazards include: none  Nutrition:   Current diet is Regular  Medications:   Patient is currently taking over-the-counter supplements  OTC medications include: see medication list  Patient is able to manage medications  Activities of Daily Living (ADLs)/Instrumental Activities of Daily Living (IADLs):   Walk and transfer into and out of bed and chair?: Yes  Dress and groom yourself?: Yes    Bathe or shower yourself?: Yes    Feed yourself? Yes  Do your laundry/housekeeping?: Yes  Manage your money, pay your bills and track your expenses?: Yes  Make your own meals?: Yes    Do your own shopping?: Yes    Advance Care Planning:   Living will: Yes    Durable POA for healthcare:  Yes    Advanced directive: Yes    Advanced directive counseling given: No    Five wishes given: No    Patient declined ACP directive: No    End of Life Decisions reviewed with patient: Yes    Provider agrees with end of life decisions: Yes      Cognitive Screening:   Provider or family/friend/caregiver concerned regarding cognition?: No    PREVENTIVE SCREENINGS      Cardiovascular Screening:    General: Screening Not Indicated and History Lipid Disorder      Diabetes Screening:     General: Screening Current      Colorectal Cancer Screening:     General: Screening Not Indicated      Breast Cancer Screening:     General: Patient Declines      Cervical Cancer Screening:    General: Screening Not Indicated      Osteoporosis Screening:    General: Screening Not Indicated and History Osteoporosis      Abdominal Aortic Aneurysm (AAA) Screening:        General: Screening Not Indicated      Lung Cancer Screening:     General: Screening Not Indicated      Hepatitis C Screening:    General: Screening Not Indicated      Domingo Grant, DO

## 2020-11-02 ENCOUNTER — TELEPHONE (OUTPATIENT)
Dept: CARDIOLOGY CLINIC | Facility: CLINIC | Age: 85
End: 2020-11-02

## 2021-01-21 DIAGNOSIS — Z23 ENCOUNTER FOR IMMUNIZATION: ICD-10-CM

## 2021-01-25 ENCOUNTER — IMMUNIZATIONS (OUTPATIENT)
Dept: FAMILY MEDICINE CLINIC | Facility: HOSPITAL | Age: 86
End: 2021-01-25

## 2021-01-25 DIAGNOSIS — Z23 ENCOUNTER FOR IMMUNIZATION: Primary | ICD-10-CM

## 2021-01-25 PROCEDURE — 91301 SARS-COV-2 / COVID-19 MRNA VACCINE (MODERNA) 100 MCG: CPT

## 2021-01-25 PROCEDURE — 0011A SARS-COV-2 / COVID-19 MRNA VACCINE (MODERNA) 100 MCG: CPT

## 2021-02-22 ENCOUNTER — IMMUNIZATIONS (OUTPATIENT)
Dept: FAMILY MEDICINE CLINIC | Facility: HOSPITAL | Age: 86
End: 2021-02-22

## 2021-02-22 DIAGNOSIS — Z23 ENCOUNTER FOR IMMUNIZATION: Primary | ICD-10-CM

## 2021-02-22 PROCEDURE — 0012A SARS-COV-2 / COVID-19 MRNA VACCINE (MODERNA) 100 MCG: CPT

## 2021-02-22 PROCEDURE — 91301 SARS-COV-2 / COVID-19 MRNA VACCINE (MODERNA) 100 MCG: CPT

## 2021-03-10 ENCOUNTER — APPOINTMENT (OUTPATIENT)
Dept: LAB | Facility: CLINIC | Age: 86
End: 2021-03-10
Payer: COMMERCIAL

## 2021-03-10 ENCOUNTER — RA CDI HCC (OUTPATIENT)
Dept: OTHER | Facility: HOSPITAL | Age: 86
End: 2021-03-10

## 2021-03-10 DIAGNOSIS — E03.9 ACQUIRED HYPOTHYROIDISM: ICD-10-CM

## 2021-03-10 LAB — TSH SERPL DL<=0.05 MIU/L-ACNC: 0.85 UIU/ML (ref 0.36–3.74)

## 2021-03-10 PROCEDURE — 36415 COLL VENOUS BLD VENIPUNCTURE: CPT

## 2021-03-10 PROCEDURE — 84443 ASSAY THYROID STIM HORMONE: CPT

## 2021-03-10 NOTE — PROGRESS NOTES
[facility]  coding oppertunities          Chart reviewed, no opportunity found: CHART REVIEWED, NO OPPORTUNITY FOUND

## 2021-03-17 ENCOUNTER — OFFICE VISIT (OUTPATIENT)
Dept: FAMILY MEDICINE CLINIC | Facility: CLINIC | Age: 86
End: 2021-03-17
Payer: COMMERCIAL

## 2021-03-17 VITALS
OXYGEN SATURATION: 98 % | SYSTOLIC BLOOD PRESSURE: 130 MMHG | WEIGHT: 139.8 LBS | HEART RATE: 73 BPM | TEMPERATURE: 97.2 F | BODY MASS INDEX: 28.18 KG/M2 | DIASTOLIC BLOOD PRESSURE: 80 MMHG | HEIGHT: 59 IN

## 2021-03-17 DIAGNOSIS — E66.3 OVERWEIGHT (BMI 25.0-29.9): ICD-10-CM

## 2021-03-17 DIAGNOSIS — E03.9 ACQUIRED HYPOTHYROIDISM: Primary | ICD-10-CM

## 2021-03-17 DIAGNOSIS — Z13.31 NEGATIVE DEPRESSION SCREENING: ICD-10-CM

## 2021-03-17 PROCEDURE — 1160F RVW MEDS BY RX/DR IN RCRD: CPT | Performed by: FAMILY MEDICINE

## 2021-03-17 PROCEDURE — 3725F SCREEN DEPRESSION PERFORMED: CPT | Performed by: FAMILY MEDICINE

## 2021-03-17 PROCEDURE — 99213 OFFICE O/P EST LOW 20 MIN: CPT | Performed by: FAMILY MEDICINE

## 2021-03-17 PROCEDURE — 1036F TOBACCO NON-USER: CPT | Performed by: FAMILY MEDICINE

## 2021-03-17 RX ORDER — LEVOTHYROXINE SODIUM 0.07 MG/1
75 TABLET ORAL DAILY
Qty: 90 TABLET | Refills: 3 | Status: SHIPPED | OUTPATIENT
Start: 2021-03-17 | End: 2022-04-11 | Stop reason: SDUPTHER

## 2021-03-17 NOTE — PROGRESS NOTES
Assessment/Plan:    No problem-specific Assessment & Plan notes found for this encounter  Diagnoses and all orders for this visit:    Acquired hypothyroidism  Comments:  no change in the medication  Orders:  -     CBC and differential; Future  -     Comprehensive metabolic panel; Future  -     Lipid panel; Future  -     TSH, 3rd generation with Free T4 reflex; Future  -     levothyroxine 75 mcg tablet; Take 1 tablet (75 mcg total) by mouth daily    Overweight (BMI 25 0-29  9)    Negative depression screening          PHQ-9 Depression Screening    PHQ-9:   Frequency of the following problems over the past two weeks:      Little interest or pleasure in doing things: 0 - not at all  Feeling down, depressed, or hopeless: 0 - not at all  PHQ-2 Score: 0        BMI Counseling: Body mass index is 28 24 kg/m²  The BMI is above normal  Nutrition recommendations include reducing portion sizes and 3-5 servings of fruits/vegetables daily  Exercise recommendations include moderate aerobic physical activity for 150 minutes/week and exercising 3-5 times per week  Subjective:      Patient ID: Slick Villa is a 80 y o  female  Thyroid Problem  Presents for follow-up visit  Patient reports no cold intolerance, fatigue, heat intolerance or palpitations  The following portions of the patient's history were reviewed and updated as appropriate: allergies, current medications, past family history, past medical history, past social history, past surgical history and problem list     Review of Systems   Constitutional: Negative for fatigue  Eyes: Negative for visual disturbance  Cardiovascular: Negative for chest pain and palpitations  Endocrine: Negative for cold intolerance and heat intolerance  Neurological: Negative for headaches           Objective:    /80   Pulse 73   Temp (!) 97 2 °F (36 2 °C)   Ht 4' 11" (1 499 m)   Wt 63 4 kg (139 lb 12 8 oz)   SpO2 98%   BMI 28 24 kg/m²      Physical Exam  Vitals signs and nursing note reviewed  Constitutional:       General: She is not in acute distress  Appearance: She is well-developed  She is not diaphoretic  HENT:      Head: Normocephalic and atraumatic  Eyes:      General: No scleral icterus  Conjunctiva/sclera: Conjunctivae normal       Pupils: Pupils are equal, round, and reactive to light  Neck:      Musculoskeletal: Normal range of motion and neck supple  Cardiovascular:      Rate and Rhythm: Normal rate and regular rhythm  Heart sounds: Normal heart sounds  No murmur  Pulmonary:      Effort: Pulmonary effort is normal  No respiratory distress  Breath sounds: Normal breath sounds  No wheezing or rales  Abdominal:      General: Bowel sounds are normal  There is no distension  Palpations: Abdomen is soft  There is no mass  Tenderness: There is no abdominal tenderness  There is no guarding or rebound  Lymphadenopathy:      Cervical: No cervical adenopathy  Skin:     General: Skin is warm and dry  Neurological:      Mental Status: She is alert and oriented to person, place, and time  Psychiatric:         Behavior: Behavior normal          Thought Content:  Thought content normal          Judgment: Judgment normal

## 2021-04-08 ENCOUNTER — TELEPHONE (OUTPATIENT)
Dept: CARDIOLOGY CLINIC | Facility: CLINIC | Age: 86
End: 2021-04-08

## 2021-04-08 DIAGNOSIS — I48.91 ATRIAL FIBRILLATION, UNSPECIFIED TYPE (HCC): Primary | ICD-10-CM

## 2021-04-08 RX ORDER — METOPROLOL SUCCINATE 25 MG/1
12 TABLET, EXTENDED RELEASE ORAL DAILY
Qty: 15 TABLET | Refills: 8 | Status: SHIPPED | OUTPATIENT
Start: 2021-04-08 | End: 2021-05-05 | Stop reason: SDUPTHER

## 2021-04-08 NOTE — TELEPHONE ENCOUNTER
Patient is looking for Metoprolol I do not see this in her chart -she has appointment on 5/4 with you  Do you want to add Metoprolol ?

## 2021-04-23 ENCOUNTER — OFFICE VISIT (OUTPATIENT)
Dept: FAMILY MEDICINE CLINIC | Facility: CLINIC | Age: 86
End: 2021-04-23
Payer: COMMERCIAL

## 2021-04-23 VITALS
SYSTOLIC BLOOD PRESSURE: 138 MMHG | OXYGEN SATURATION: 97 % | WEIGHT: 139.2 LBS | TEMPERATURE: 97.5 F | HEIGHT: 59 IN | BODY MASS INDEX: 28.06 KG/M2 | DIASTOLIC BLOOD PRESSURE: 78 MMHG | HEART RATE: 65 BPM

## 2021-04-23 DIAGNOSIS — B02.9 HERPES ZOSTER WITHOUT COMPLICATION: Primary | ICD-10-CM

## 2021-04-23 PROCEDURE — 99213 OFFICE O/P EST LOW 20 MIN: CPT | Performed by: NURSE PRACTITIONER

## 2021-04-23 RX ORDER — VALACYCLOVIR HYDROCHLORIDE 1 G/1
1000 TABLET, FILM COATED ORAL 3 TIMES DAILY
Qty: 21 TABLET | Refills: 0 | Status: SHIPPED | OUTPATIENT
Start: 2021-04-23 | End: 2021-04-30

## 2021-04-23 NOTE — PROGRESS NOTES
Assessment/Plan:    No problem-specific Assessment & Plan notes found for this encounter  Diagnoses and all orders for this visit:    Herpes zoster without complication  -     valACYclovir (VALTREX) 1,000 mg tablet; Take 1 tablet (1,000 mg total) by mouth 3 (three) times a day for 7 days          Subjective:      Patient ID: Lea Castro is a 80 y o  female  Patient states that she is having pain around T5 around the side to the left breast stopping midline  She states that she had a rash the other day that her sister describes as red and angry  She states  That she thinks it was draining as her bra was stuck to her skin  Rash has resolved but patient still has pain around the dermatome  Does have complaint of tension headache- worse with looking down, left side of the neck intermittent pain  Encouraged stretching/ States aspirin relieves pain  No other complaints or associated symptoms  The following portions of the patient's history were reviewed and updated as appropriate: allergies, current medications, past family history, past medical history, past social history, past surgical history and problem list     Review of Systems   Constitutional: Negative for activity change, appetite change, chills, fatigue, fever and unexpected weight change  HENT: Negative for congestion, dental problem, drooling, ear discharge, ear pain, hearing loss, mouth sores, postnasal drip, rhinorrhea, sinus pressure, sinus pain, sneezing, sore throat, tinnitus and voice change  Eyes: Negative for photophobia, pain, discharge, redness, itching and visual disturbance  Respiratory: Negative for chest tightness, shortness of breath and wheezing  Cardiovascular: Negative for chest pain, palpitations and leg swelling  Gastrointestinal: Negative for abdominal distention, constipation, diarrhea and nausea  Endocrine: Negative for cold intolerance, heat intolerance, polydipsia, polyphagia and polyuria  Musculoskeletal: Positive for neck pain  Negative for arthralgias, back pain, joint swelling and myalgias  Skin: Negative for color change, rash and wound  Neurological: Positive for headaches  Negative for dizziness, tremors, seizures, syncope, speech difficulty, weakness, light-headedness and numbness  Hematological: Negative for adenopathy  Does not bruise/bleed easily  Objective:      /78 (BP Location: Left arm, Patient Position: Sitting)   Pulse 65   Temp 97 5 °F (36 4 °C) (Tympanic)   Ht 4' 11" (1 499 m)   Wt 63 1 kg (139 lb 3 2 oz)   SpO2 97%   BMI 28 11 kg/m²          Physical Exam  Constitutional:       General: She is not in acute distress  Appearance: Normal appearance  She is not ill-appearing or toxic-appearing  HENT:      Head: Normocephalic and atraumatic  Right Ear: Tympanic membrane, ear canal and external ear normal  There is no impacted cerumen  Left Ear: Tympanic membrane, ear canal and external ear normal  There is no impacted cerumen  Nose: Nose normal  No congestion or rhinorrhea  Mouth/Throat:      Mouth: Mucous membranes are moist       Pharynx: Oropharynx is clear  No oropharyngeal exudate or posterior oropharyngeal erythema  Eyes:      General:         Right eye: No discharge  Left eye: No discharge  Conjunctiva/sclera: Conjunctivae normal       Pupils: Pupils are equal, round, and reactive to light  Cardiovascular:      Rate and Rhythm: Normal rate and regular rhythm  Pulses: Normal pulses  Heart sounds: Normal heart sounds  No murmur  No friction rub  Pulmonary:      Effort: Pulmonary effort is normal  No respiratory distress  Breath sounds: Normal breath sounds  No wheezing  Chest:      Chest wall: No tenderness  Abdominal:      General: Abdomen is flat  Bowel sounds are normal       Palpations: Abdomen is soft  There is no mass  Tenderness: There is no abdominal tenderness   There is no guarding or rebound  Skin:     General: Skin is warm and dry  Capillary Refill: Capillary refill takes less than 2 seconds  Coloration: Skin is not jaundiced or pale  Findings: No bruising, erythema, lesion or rash  Comments: Superficial skin tenderness left side back around breast- t5 dermatome    Neurological:      General: No focal deficit present  Mental Status: She is alert and oriented to person, place, and time  Mental status is at baseline  Cranial Nerves: No cranial nerve deficit  Sensory: No sensory deficit  Motor: No weakness  Coordination: Coordination normal       Gait: Gait normal    Psychiatric:         Mood and Affect: Mood normal          Behavior: Behavior normal          Thought Content:  Thought content normal          Judgment: Judgment normal

## 2021-04-23 NOTE — PATIENT INSTRUCTIONS
Shingles   WHAT YOU NEED TO KNOW:   Shingles is a painful rash  Shingles is caused by the same virus that causes chickenpox (varicella-zoster)  After you get chickenpox, the virus stays in your body for several years without causing any symptoms  Shingles occurs when the virus becomes active again  The active virus travels along a nerve to your skin and causes a rash  DISCHARGE INSTRUCTIONS:   Call your local emergency number (911 in the [address] [facility]) if:   · You have trouble moving your arms, legs, or face  · You become confused, or have difficulty speaking  · You have a seizure  Return to the emergency department if:   · You have weakness in an arm or leg  · You have dizziness, a severe headache, or hearing or vision loss  · You have painful, red, warm skin around the blisters, or the blisters drain pus  · Your neck is stiff or you have trouble moving it  Call your doctor if:   · You feel weak or have a headache  · You have a cough, chills, or a fever  · You have abdominal pain or nausea, or you are vomiting  · Your rash becomes more itchy or painful  · Your rash spreads to other parts of your body  · Your pain worsens and does not go away even after you take medicine  · You have questions or concerns about your condition or care  Medicines: You may need any of the following:  · Antiviral medicine  helps decrease symptoms and healing time  They may also decrease your risk of developing nerve pain  You will need to start taking them within 3 days of the start of symptoms to prevent nerve pain  · Prescription pain medicine  may be given  Ask your healthcare provider how to take this medicine safely  Some prescription pain medicines contain acetaminophen  Do not take other medicines that contain acetaminophen without talking to your healthcare provider  Too much acetaminophen may cause liver damage  Prescription pain medicine may cause constipation   Ask your healthcare provider how to prevent or treat constipation  · Acetaminophen  decreases pain and fever  It is available without a doctor's order  Ask how much to take and how often to take it  Follow directions  Read the labels of all other medicines you are using to see if they also contain acetaminophen, or ask your doctor or pharmacist  Acetaminophen can cause liver damage if not taken correctly  Do not use more than 4 grams (4,000 milligrams) total of acetaminophen in one day  · NSAIDs , such as ibuprofen, help decrease swelling, pain, and fever  This medicine is available with or without a doctor's order  NSAIDs can cause stomach bleeding or kidney problems in certain people  If you take blood thinner medicine, always ask if NSAIDs are safe for you  Always read the medicine label and follow directions  Do not give these medicines to children under 10months of age without direction from your child's healthcare provider  · Topical anesthetics  are used to numb the skin and decrease pain  They can be a cream, gel, spray, or patch  · Anticonvulsants  decrease nerve pain and may help you sleep at night  · Antidepressants  may be used to decrease nerve pain  · Take your medicine as directed  Contact your healthcare provider if you think your medicine is not helping or if you have side effects  Tell him of her if you are allergic to any medicine  Keep a list of the medicines, vitamins, and herbs you take  Include the amounts, and when and why you take them  Bring the list or the pill bottles to follow-up visits  Carry your medicine list with you in case of an emergency  Self-care:  Keep your rash clean and dry  Cover your rash with a bandage or clothing  Do not use bandages that stick to your skin  The sticky part may irritate your skin and make your rash last longer  Prevent the spread of germs:       · Wash your hands often  Wash your hands several times each day   Wash after you use the bathroom, change a child's diaper, and before you prepare or eat food  Use soap and water every time  Rub your soapy hands together, lacing your fingers  Wash the front and back of your hands, and in between your fingers  Use the fingers of one hand to scrub under the fingernails of the other hand  Wash for at least 20 seconds  Rinse with warm, running water for several seconds  Then dry your hands with a clean towel or paper towel  Use hand  that contains alcohol if soap and water are not available  Do not touch your eyes, nose, or mouth without washing your hands first          · Cover a sneeze or cough  Use a tissue that covers your mouth and nose  Throw the tissue away in a trash can right away  Use the bend of your arm if a tissue is not available  Wash your hands well with soap and water or use a hand   · Stay away from others while you are sick  Avoid crowds as much as possible  · Ask about vaccines you may need  Talk to your healthcare provider about your vaccine history  He or she will tell you which vaccines you need, and when to get them  Prevent shingles or another shingles outbreak:  A vaccine may be given to help prevent shingles  You can get the vaccine even if you already had shingles  The vaccine can help prevent a future outbreak  If you do get shingles again, the vaccine can keep it from becoming severe  The vaccine comes in 2 forms  Your healthcare provider will tell you which form is right for you  The decision is based on your age and any medical conditions you have  A 2-dose vaccine is usually given to adults 48 years or older  A 1-dose vaccine may be given to adults 61 years or older    For more information:   · Centers for Disease Control and Prevention  [address] , [address]  Phone: 8- 861 - 2169332  Phone: 1- 086 - 7451463  Web Address: [employer] com     Follow up with your doctor as directed:  Write down your questions so you remember to ask them during your visits  © Copyright [address] [place] [address] Information is for End User's use only and may not be sold, redistributed or otherwise used for commercial purposes  All illustrations and images included in CareNotes® are the copyrighted property of A D A M [employer] Inc  or Linda Holder  The above information is an  only  It is not intended as medical advice for individual conditions or treatments  Talk to your doctor, nurse or pharmacist before following any medical regimen to see if it is safe and effective for you

## 2021-04-27 ENCOUNTER — OFFICE VISIT (OUTPATIENT)
Dept: FAMILY MEDICINE CLINIC | Facility: CLINIC | Age: 86
End: 2021-04-27
Payer: COMMERCIAL

## 2021-04-27 VITALS
TEMPERATURE: 97.8 F | OXYGEN SATURATION: 96 % | WEIGHT: 138 LBS | BODY MASS INDEX: 27.82 KG/M2 | DIASTOLIC BLOOD PRESSURE: 70 MMHG | HEIGHT: 59 IN | HEART RATE: 72 BPM | SYSTOLIC BLOOD PRESSURE: 132 MMHG

## 2021-04-27 DIAGNOSIS — B02.9 HERPES ZOSTER WITHOUT COMPLICATION: Primary | ICD-10-CM

## 2021-04-27 PROCEDURE — 99213 OFFICE O/P EST LOW 20 MIN: CPT | Performed by: NURSE PRACTITIONER

## 2021-04-27 RX ORDER — CAPSAICIN 0.07 G/100G
CREAM TOPICAL 3 TIMES DAILY
Qty: 28.3 G | Refills: 0 | Status: SHIPPED | OUTPATIENT
Start: 2021-04-27 | End: 2021-09-22 | Stop reason: ALTCHOICE

## 2021-04-27 NOTE — PROGRESS NOTES
Assessment/Plan:    No problem-specific Assessment & Plan notes found for this encounter  Diagnoses and all orders for this visit:    Herpes zoster without complication  -     capsicum (ZOSTRIX) 0 075 % topical cream; Apply topically 3 (three) times a day          Subjective:      Patient ID: Bear Ortiz is a 80 y o  female  Patient presents for follow up on rash  She states that her pain is improving but still present at night when the covers are touching it  Will try capsaicin cream to see if it helps with symptoms  No new lesions have erupted  The following portions of the patient's history were reviewed and updated as appropriate: allergies, current medications, past family history, past medical history, past social history, past surgical history and problem list     Review of Systems   Constitutional: Negative for activity change, appetite change, fatigue, fever and unexpected weight change  Respiratory: Negative for chest tightness, shortness of breath and wheezing  Cardiovascular: Negative for chest pain, palpitations and leg swelling  Gastrointestinal: Negative for abdominal pain, constipation, diarrhea and nausea  Endocrine: Negative for cold intolerance, heat intolerance, polydipsia, polyphagia and polyuria  Musculoskeletal: Negative for arthralgias, joint swelling, myalgias, neck pain and neck stiffness  Skin: Negative for color change, pallor, rash and wound  Hematological: Negative for adenopathy  Does not bruise/bleed easily  Objective:      /70 (BP Location: Left arm, Patient Position: Sitting)   Pulse 72   Temp 97 8 °F (36 6 °C) (Tympanic)   Ht 4' 11" (1 499 m)   Wt 62 6 kg (138 lb)   SpO2 96%   BMI 27 87 kg/m²          Physical Exam  Constitutional:       General: She is not in acute distress  Appearance: Normal appearance  She is not ill-appearing or toxic-appearing  HENT:      Head: Normocephalic and atraumatic     Cardiovascular: Rate and Rhythm: Normal rate and regular rhythm  Pulses: Normal pulses  Heart sounds: Normal heart sounds  No murmur  No friction rub  No gallop  Pulmonary:      Effort: Pulmonary effort is normal  No respiratory distress  Breath sounds: Normal breath sounds  No stridor  No wheezing or rales  Skin:     General: Skin is warm and dry  Capillary Refill: Capillary refill takes less than 2 seconds  Coloration: Skin is not jaundiced or pale  Findings: No bruising, erythema, lesion or rash  Neurological:      General: No focal deficit present  Mental Status: She is alert and oriented to person, place, and time  Mental status is at baseline  Cranial Nerves: No cranial nerve deficit  Motor: No weakness  Gait: Gait normal    Psychiatric:         Mood and Affect: Mood normal          Behavior: Behavior normal          Thought Content:  Thought content normal          Judgment: Judgment normal

## 2021-05-04 ENCOUNTER — OFFICE VISIT (OUTPATIENT)
Dept: CARDIOLOGY CLINIC | Facility: CLINIC | Age: 86
End: 2021-05-04
Payer: COMMERCIAL

## 2021-05-04 VITALS
HEIGHT: 59 IN | SYSTOLIC BLOOD PRESSURE: 128 MMHG | HEART RATE: 55 BPM | WEIGHT: 140.4 LBS | BODY MASS INDEX: 28.3 KG/M2 | DIASTOLIC BLOOD PRESSURE: 78 MMHG

## 2021-05-04 DIAGNOSIS — Z98.890 HISTORY OF RADIOFREQUENCY ABLATION (RFA) PROCEDURE FOR CARDIAC ARRHYTHMIA: ICD-10-CM

## 2021-05-04 DIAGNOSIS — E78.00 HYPERCHOLESTEROLEMIA: ICD-10-CM

## 2021-05-04 DIAGNOSIS — I47.1 SUPRAVENTRICULAR TACHYCARDIA (HCC): Primary | ICD-10-CM

## 2021-05-04 DIAGNOSIS — I25.10 CORONARY ARTERY DISEASE INVOLVING NATIVE CORONARY ARTERY OF NATIVE HEART WITHOUT ANGINA PECTORIS: ICD-10-CM

## 2021-05-04 PROCEDURE — 1160F RVW MEDS BY RX/DR IN RCRD: CPT | Performed by: INTERNAL MEDICINE

## 2021-05-04 PROCEDURE — 99214 OFFICE O/P EST MOD 30 MIN: CPT | Performed by: INTERNAL MEDICINE

## 2021-05-04 PROCEDURE — 1036F TOBACCO NON-USER: CPT | Performed by: INTERNAL MEDICINE

## 2021-05-04 PROCEDURE — 93000 ELECTROCARDIOGRAM COMPLETE: CPT | Performed by: INTERNAL MEDICINE

## 2021-05-04 NOTE — PROGRESS NOTES
Cardiology Follow Up    Bear Ortiz  1933  [id number]  [facility]  [facility] [address]  [address]  [phone]    1  Supraventricular tachycardia ([address] )     2  Coronary artery disease involving native coronary artery of native heart without angina pectoris     3  Hypercholesterolemia     4  History of radiofrequency ablation (RFA) procedure for cardiac arrhythmia         Interval History:  Cardiology follow-up  Patient was last seen on 06/19  Patient continues to do well, she is very active, she developed by VZ virus infection and she has known neuropathy no exertional symptoms no orthopnea no PND compliant low-cholesterol diet, lipids last checked total cholesterol 189 HDL 73  she has been intolerant to statins in the past, she has been taking aspirin  No significant bleeding issues, his sleep hygiene appears to somewhat poor  Her  will home she was her primary caregiver passed away this past November, there were  61 years  Will  Patient Active Problem List   Diagnosis    Supraventricular tachycardia ([address] )    Coronary artery disease involving native coronary artery of native heart without angina pectoris    Hypercholesterolemia    Negative depression screening    History of radiofrequency ablation (RFA) procedure for cardiac arrhythmia    Overweight (BMI 25 0-29  9)    Medicare annual wellness visit, subsequent    Acquired hypothyroidism    Age related osteoporosis    Hyponatremia     Past Medical History:   Diagnosis Date    Cardiac arrhythmia     Esophageal reflux     Hypertension     Hypothyroidism     Varicella-zoster infection      Social History     Socioeconomic History    Marital status:       Spouse name: Not on file    Number of children: 3    Years of education: Not on file    Highest education level: Not on file   Occupational History    Occupation: Organist for [employer]     Comment: Retired   Social Needs    Financial resource strain: Not on file    Food insecurity     Worry: Not on file     Inability: Not on file   [employer] needs     Medical: Not on file     Non-medical: Not on file   Tobacco Use    Smoking status: Never Smoker    Smokeless tobacco: Never Used   Substance and Sexual Activity    Alcohol use: No    Drug use: No    Sexual activity: Not on file   Lifestyle    Physical activity     Days per week: Not on file     Minutes per session: Not on file    Stress: Not on file   Relationships    Social connections     Talks on phone: Not on file     Gets together: Not on file     Attends Spiritism service: Not on file     Active member of club or organization: Not on file     Attends meetings of clubs or organizations: Not on file     Relationship status: Not on file    Intimate partner violence     Fear of current or ex partner: Not on file     Emotionally abused: Not on file     Physically abused: Not on file     Forced sexual activity: Not on file   Other Topics Concern    Not on file   Social History Narrative    No caffeine use      Family History   Problem Relation Age of Onset    Heart disease Mother     Heart disease Father     COPD Father      Past Surgical History:   Procedure Laterality Date    CHOLECYSTECTOMY      COLON SURGERY      CORONARY ANGIOPLASTY      PARTIAL HYSTERECTOMY      SMALL INTESTINE SURGERY      Partial    TONSILLECTOMY         Current Outpatient Medications:     aspirin 81 MG tablet, Take 81 mg by mouth every other day , Disp: , Rfl:     cholecalciferol (VITAMIN D3) 1,000 units tablet, Take 4,000 Units by mouth daily , Disp: , Rfl:     Coenzyme Q10 (CO Q10) 100 MG CAPS, Take 1 capsule by mouth daily  , Disp: , Rfl:     levothyroxine 75 mcg tablet, Take 1 tablet (75 mcg total) by mouth daily, Disp: 90 tablet, Rfl: 3    metoprolol succinate (TOPROL-XL) 25 mg 24 hr tablet, Take 0 5 tablets (12 5 mg total) by mouth daily, Disp: 15 tablet, Rfl: 8    Omega-3 Fatty Acids (FISH OIL PO), Take 1 capsule by mouth daily, Disp: , Rfl:     PREVIDENT 5000 PLUS 1 1 % CREA, daily , Disp: , Rfl: 0    capsicum (ZOSTRIX) 0 075 % topical cream, Apply topically 3 (three) times a day (Patient not taking: Reported on 5/4/2021), Disp: 28 3 g, Rfl: 0    Multiple Vitamins-Minerals (RA VISION-ROCK PRESERVE PO), Take 1 tablet by mouth 2 (two) times a day , Disp: , Rfl:     valACYclovir (VALTREX) 1,000 mg tablet, Take 1 tablet (1,000 mg total) by mouth 3 (three) times a day for 7 days (Patient not taking: Reported on 5/4/2021), Disp: 21 tablet, Rfl: 0  Allergies   Allergen Reactions    Bextra [Valdecoxib]     Codeine Nausea Only, Headache and Tremor    Demerol [Meperidine] Nausea Only and Headache    Diovan [Valsartan]     Enalapril Cough    Lisinopril Cough    Penicillins     Percocet [Oxycodone-Acetaminophen]     Sulfa Antibiotics Abdominal Pain    Tramadol Nausea Only and Headache    Vicodin [Hydrocodone-Acetaminophen]     Zithromax [Azithromycin] Diarrhea    Zocor [Simvastatin] Hives       Labs:  Appointment on 03/10/2021   Component Date Value    TSH 3RD GENERATON 03/10/2021 0 853      Imaging: No results found  Review of Systems:  Review of Systems   Constitutional: Negative for activity change and fatigue  HENT: Positive for hearing loss  Negative for nosebleeds  Eyes: Negative for visual disturbance  Respiratory: Negative for apnea, shortness of breath, wheezing and stridor  Cardiovascular: Negative for chest pain, palpitations and leg swelling  Gastrointestinal: Negative for abdominal pain and blood in stool  Endocrine: Negative for cold intolerance  Genitourinary: Negative for hematuria  Musculoskeletal: Negative for arthralgias, gait problem and myalgias  Skin: Negative for color change and pallor  Allergic/Immunologic: Positive for immunocompromised state  Neurological: Positive for numbness  Negative for syncope  Hematological: Does not bruise/bleed easily  Psychiatric/Behavioral: Negative for sleep disturbance  The patient is not nervous/anxious  Physical Exam:  Physical Exam  Vitals signs reviewed  Constitutional:       General: She is not in acute distress  Appearance: Normal appearance  She is normal weight  She is not ill-appearing, toxic-appearing or diaphoretic  Eyes:      General: No scleral icterus  Neck:      Vascular: No carotid bruit  Cardiovascular:      Rate and Rhythm: Normal rate and regular rhythm  Pulses: Normal pulses  Heart sounds: Normal heart sounds  No murmur  No friction rub  No gallop  Pulmonary:      Effort: Pulmonary effort is normal  No respiratory distress  Breath sounds: Normal breath sounds  No stridor  No wheezing, rhonchi or rales  Musculoskeletal:      Right lower leg: No edema  Left lower leg: No edema  Skin:     General: Skin is warm and dry  Capillary Refill: Capillary refill takes less than 2 seconds  Neurological:      General: No focal deficit present  Mental Status: She is alert  Psychiatric:         Mood and Affect: Mood normal          Discussion/Summary:  Coronary artery disease, PTCA/stent of the LAD 2008, stress test 2018 she did 4 minutes of Doug protocol, there was no EKG criteria for ischemia he achieved target heart rate  Intolerant to multiple medications including statins, lipids are slightly suboptimal   Last visit I asked her to take aspirin every other day as she was not taking it  She has history of supraventricular tachycardia status post radiofrequency ablation  She is on low-dose beta-blocker  This note was completed in part utilizing [employer] direct voice recognition software     Grammatical errors, random word insertion, spelling mistakes, and incomplete sentences may be an occasional consequence of the system secondary to software limitations, ambient noise and hardware issues  At the time of dictation, efforts were made to edit, clarify and /or correct errors  Please read the chart carefully and recognize, using context, where substitutions have occurred  If you have any questions or concerns about the context, text or information contained within the body of this dictation, please contact myself, the provider, for further clarification

## 2021-05-05 DIAGNOSIS — I48.91 ATRIAL FIBRILLATION, UNSPECIFIED TYPE (HCC): ICD-10-CM

## 2021-05-05 RX ORDER — METOPROLOL SUCCINATE 25 MG/1
12 TABLET, EXTENDED RELEASE ORAL DAILY
Qty: 15 TABLET | Refills: 11 | Status: SHIPPED | OUTPATIENT
Start: 2021-05-05 | End: 2022-05-06 | Stop reason: SDUPTHER

## 2021-05-29 NOTE — PROGRESS NOTES
Assessment and Plan:     Problem List Items Addressed This Visit     None      Visit Diagnoses     Medicare annual wellness visit, subsequent    -  Primary         History of Present Illness:     Patient presents for Medicare Annual Wellness visit    Patient Care Team:  Tyron Doss DO as PCP - General  Ayaka Delgado MD Ripley Skillern, Seven Beauchamp MD     Problem List:     Patient Active Problem List   Diagnosis    Supraventricular tachycardia ([address] )    Coronary artery disease involving native coronary artery of native heart without angina pectoris    Hypercholesterolemia      Past Medical and Surgical History:     Past Medical History:   Diagnosis Date    Cardiac arrhythmia     Esophageal reflux     Hypertension     Hypothyroidism     Varicella-zoster infection      Past Surgical History:   Procedure Laterality Date    CHOLECYSTECTOMY      COLON SURGERY      CORONARY ANGIOPLASTY      PARTIAL HYSTERECTOMY      SMALL INTESTINE SURGERY      Partial    TONSILLECTOMY        Family History:     Family History   Problem Relation Age of Onset    Heart disease Mother     Heart disease Father     COPD Father       Social History:     Social History     Tobacco Use   Smoking Status Never Smoker   Smokeless Tobacco Never Used     Social History     Substance and Sexual Activity   Alcohol Use No     Social History     Substance and Sexual Activity   Drug Use No      Medications and Allergies:     Current Outpatient Medications   Medication Sig Dispense Refill    aspirin 81 MG tablet Take 81 mg by mouth daily   cholecalciferol (VITAMIN D3) 1,000 units tablet Take 5,000 Units by mouth every other day      ciprofloxacin (CIPRO) 500 mg tablet Take 1 tablet (500 mg total) by mouth every 12 (twelve) hours for 10 days 20 tablet 0    Coenzyme Q10 (CO Q10) 100 MG CAPS Take 1 capsule by mouth daily        fluticasone (FLONASE) 50 mcg/act nasal spray 2 sprays into each nostril daily 16 g 11    levothyroxine 75 mcg tablet Take 1 tablet (75 mcg total) by mouth daily 90 tablet 2    metoprolol succinate (TOPROL-XL) 25 mg 24 hr tablet Take 0 5 tablets (12 5 mg total) by mouth daily 45 tablet 3    Multiple Vitamins-Minerals (RA VISION-ROCK PRESERVE PO) Take by mouth 2 (two) times a day      PREVIDENT 5000 PLUS 1 1 % CREA   0    Vitamin Mixture (VITAMIN E COMPLETE PO) Take 150 mg by mouth daily   phenazopyridine (PYRIDIUM) 100 mg tablet Take 1 tablet (100 mg total) by mouth 3 (three) times a day as needed for bladder spasms (Patient not taking: Reported on 9/9/2019) 60 tablet 0     No current facility-administered medications for this visit  Allergies   Allergen Reactions    Bextra [Valdecoxib]     Codeine Nausea Only, Headache and Tremor    Demerol [Meperidine] Nausea Only and Headache    Diovan [Valsartan]     Enalapril Cough    Lisinopril Cough    Penicillins     Percocet [Oxycodone-Acetaminophen]     Sulfa Antibiotics Abdominal Pain    Tramadol Nausea Only and Headache    Vicodin [Hydrocodone-Acetaminophen]     Zithromax [Azithromycin] Diarrhea    Zocor [Simvastatin] Hives      Immunizations:     Immunization History   Administered Date(s) Administered    DT (pediatric) 06/07/2001    INFLUENZA 12/12/2017    Influenza TIV (IM) 10/20/2010    Pneumococcal Conjugate 13-Valent 02/27/2018    Pneumococcal Polysaccharide PPV23 02/09/2007    Tdap 09/09/2015      Medicare Screening Tests and Risk Assessments:     Cassandra Mclaughlin is here for her Subsequent Wellness visit  Last Medicare Wellness visit information reviewed, patient interviewed and updates made to the record today  Health Risk Assessment:  Patient rates overall health as good  Patient feels that their physical health rating is Same  Eyesight was rated as Same  Hearing was rated as Same  Patient feels that their emotional and mental health rating is Same  Pain experienced by patient in the last 7 days has been None  Patient states that she has experienced no weight loss or gain in last 6 months  Emotional/Mental Health:  Patient has been feeling nervous/anxious  PHQ-9 Depression Screening:    Frequency of the following problems over the past two weeks:      1  Little interest or pleasure in doing things: 0 - not at all      2  Feeling down, depressed, or hopeless: 0 - not at all  PHQ-2 Score: 0          Broken Bones/Falls: Fall Risk Assessment:    In the past year, patient has experienced: No history of falling in past year          Bladder/Bowel:  Patient has leaked urine accidently in the last six months  Patient reports no loss of bowel control  Immunizations:  Patient has not had a flu vaccination within the last year  Patient has received a pneumonia shot  Patient has not received a shingles shot  Patient has not received tetanus/diphtheria shot  Home Safety:  Patient has trouble with stairs inside or outside of their home  Patient currently reports that there are no safety hazards present in home, working smoke alarms, working carbon monoxide detectors  Preventative Screenings:   Breast cancer screening performed, colon cancer screen completed, cholesterol screen completed, glaucoma eye exam completed,     Nutrition:  Current diet: Regular with servings of the following:    Medications:  Patient is currently taking over-the-counter supplements  List of OTC medications includes: vitamins  Patient is able to manage medications  Lifestyle Choices:  Patient reports no tobacco use  Patient has not smoked or used tobacco in the past   Patient reports no alcohol use  Patient drives a vehicle  Patient wears seat belt  Current level of exercise of physical activity described by patient as: moderate          Activities of Daily Living:  Can get out of bed by his or her self, able to dress self, able to make own meals, able to do own shopping, able to bathe self, can do own laundry/housekeeping, can manage own money, pay bills and track expenses    Previous Hospitalizations:  No hospitalization or ED visit in past 12 months        Advanced Directives:  Patient has decided on a power of   Patient has spoken to designated power of   Patient has completed advanced directive  Preventative Screening/Counseling:      Cardiovascular:      General: Screening Current          Diabetes:      General: Screening Current          Colorectal Cancer:      General: Screening Current          Breast Cancer:      General: Screening Current          Cervical Cancer:      General: Screening Current          Osteoporosis:      General: Screening Not Indicated          AAA:      General: Screening Current          Glaucoma:      General: Screening Not Indicated          HIV:      General: Screening Not Indicated          Hepatitis C:      General: Screening Not Indicated        Advanced Directives:   Patient has living will for healthcare, has durable POA for healthcare, patient has an advanced directive  Information on ACP and/or AD not provided  No 5 wishes given  End of life assessment reviewed with patient  Provider agrees with end of life decisions   DISPLAY PLAN FREE TEXT

## 2021-09-23 ENCOUNTER — OFFICE VISIT (OUTPATIENT)
Dept: FAMILY MEDICINE CLINIC | Facility: CLINIC | Age: 86
End: 2021-09-23
Payer: COMMERCIAL

## 2021-09-23 VITALS
BODY MASS INDEX: 28.83 KG/M2 | HEART RATE: 70 BPM | OXYGEN SATURATION: 97 % | SYSTOLIC BLOOD PRESSURE: 132 MMHG | WEIGHT: 143 LBS | HEIGHT: 59 IN | DIASTOLIC BLOOD PRESSURE: 74 MMHG | TEMPERATURE: 98.5 F

## 2021-09-23 DIAGNOSIS — L30.9 DERMATITIS: Primary | ICD-10-CM

## 2021-09-23 PROCEDURE — 99213 OFFICE O/P EST LOW 20 MIN: CPT | Performed by: NURSE PRACTITIONER

## 2021-09-23 NOTE — PATIENT INSTRUCTIONS
You can take benadryl 12 5 mg if needed for itching  Be careful as this medication can make you feel drowsy  Use a cool compress on your left arm to help with the itching

## 2021-10-04 ENCOUNTER — RA CDI HCC (OUTPATIENT)
Dept: OTHER | Facility: HOSPITAL | Age: 86
End: 2021-10-04

## 2021-10-05 ENCOUNTER — APPOINTMENT (OUTPATIENT)
Dept: LAB | Facility: CLINIC | Age: 86
End: 2021-10-05
Payer: COMMERCIAL

## 2021-10-05 DIAGNOSIS — E03.9 ACQUIRED HYPOTHYROIDISM: ICD-10-CM

## 2021-10-05 LAB
ALBUMIN SERPL BCP-MCNC: 3.5 G/DL (ref 3.5–5)
ALP SERPL-CCNC: 89 U/L (ref 46–116)
ALT SERPL W P-5'-P-CCNC: 29 U/L (ref 12–78)
ANION GAP SERPL CALCULATED.3IONS-SCNC: 3 MMOL/L (ref 4–13)
AST SERPL W P-5'-P-CCNC: 22 U/L (ref 5–45)
BASOPHILS # BLD AUTO: 0.06 THOUSANDS/ΜL (ref 0–0.1)
BASOPHILS NFR BLD AUTO: 1 % (ref 0–1)
BILIRUB SERPL-MCNC: 0.86 MG/DL (ref 0.2–1)
BUN SERPL-MCNC: 17 MG/DL (ref 5–25)
CALCIUM SERPL-MCNC: 9 MG/DL (ref 8.3–10.1)
CHLORIDE SERPL-SCNC: 103 MMOL/L (ref 100–108)
CHOLEST SERPL-MCNC: 199 MG/DL (ref 50–200)
CO2 SERPL-SCNC: 27 MMOL/L (ref 21–32)
CREAT SERPL-MCNC: 0.91 MG/DL (ref 0.6–1.3)
EOSINOPHIL # BLD AUTO: 0.14 THOUSAND/ΜL (ref 0–0.61)
EOSINOPHIL NFR BLD AUTO: 3 % (ref 0–6)
ERYTHROCYTE [DISTWIDTH] IN BLOOD BY AUTOMATED COUNT: 13.2 % (ref 11.6–15.1)
GFR SERPL CREATININE-BSD FRML MDRD: 57 ML/MIN/1.73SQ M
GLUCOSE P FAST SERPL-MCNC: 103 MG/DL (ref 65–99)
HCT VFR BLD AUTO: 43.6 % (ref 34.8–46.1)
HDLC SERPL-MCNC: 75 MG/DL
HGB BLD-MCNC: 14.3 G/DL (ref 11.5–15.4)
IMM GRANULOCYTES # BLD AUTO: 0.01 THOUSAND/UL (ref 0–0.2)
IMM GRANULOCYTES NFR BLD AUTO: 0 % (ref 0–2)
LDLC SERPL CALC-MCNC: 112 MG/DL (ref 0–100)
LYMPHOCYTES # BLD AUTO: 2.1 THOUSANDS/ΜL (ref 0.6–4.47)
LYMPHOCYTES NFR BLD AUTO: 44 % (ref 14–44)
MCH RBC QN AUTO: 31.2 PG (ref 26.8–34.3)
MCHC RBC AUTO-ENTMCNC: 32.8 G/DL (ref 31.4–37.4)
MCV RBC AUTO: 95 FL (ref 82–98)
MONOCYTES # BLD AUTO: 0.52 THOUSAND/ΜL (ref 0.17–1.22)
MONOCYTES NFR BLD AUTO: 11 % (ref 4–12)
NEUTROPHILS # BLD AUTO: 2 THOUSANDS/ΜL (ref 1.85–7.62)
NEUTS SEG NFR BLD AUTO: 41 % (ref 43–75)
NONHDLC SERPL-MCNC: 124 MG/DL
NRBC BLD AUTO-RTO: 0 /100 WBCS
PLATELET # BLD AUTO: 333 THOUSANDS/UL (ref 149–390)
PMV BLD AUTO: 9.1 FL (ref 8.9–12.7)
POTASSIUM SERPL-SCNC: 3.9 MMOL/L (ref 3.5–5.3)
PROT SERPL-MCNC: 7.7 G/DL (ref 6.4–8.2)
RBC # BLD AUTO: 4.58 MILLION/UL (ref 3.81–5.12)
SODIUM SERPL-SCNC: 133 MMOL/L (ref 136–145)
TRIGL SERPL-MCNC: 59 MG/DL
TSH SERPL DL<=0.05 MIU/L-ACNC: 1.37 UIU/ML (ref 0.36–3.74)
WBC # BLD AUTO: 4.83 THOUSAND/UL (ref 4.31–10.16)

## 2021-10-05 PROCEDURE — 85025 COMPLETE CBC W/AUTO DIFF WBC: CPT

## 2021-10-05 PROCEDURE — 80061 LIPID PANEL: CPT

## 2021-10-05 PROCEDURE — 80053 COMPREHEN METABOLIC PANEL: CPT

## 2021-10-05 PROCEDURE — 84443 ASSAY THYROID STIM HORMONE: CPT

## 2021-10-05 PROCEDURE — 36415 COLL VENOUS BLD VENIPUNCTURE: CPT

## 2021-10-11 ENCOUNTER — OFFICE VISIT (OUTPATIENT)
Dept: FAMILY MEDICINE CLINIC | Facility: CLINIC | Age: 86
End: 2021-10-11
Payer: COMMERCIAL

## 2021-10-11 VITALS
TEMPERATURE: 97.5 F | DIASTOLIC BLOOD PRESSURE: 82 MMHG | OXYGEN SATURATION: 95 % | BODY MASS INDEX: 28.3 KG/M2 | WEIGHT: 140.38 LBS | HEART RATE: 47 BPM | SYSTOLIC BLOOD PRESSURE: 138 MMHG | HEIGHT: 59 IN

## 2021-10-11 DIAGNOSIS — I10 ESSENTIAL HYPERTENSION: ICD-10-CM

## 2021-10-11 DIAGNOSIS — E78.00 HYPERCHOLESTEROLEMIA: ICD-10-CM

## 2021-10-11 DIAGNOSIS — Z00.00 MEDICARE ANNUAL WELLNESS VISIT, SUBSEQUENT: Primary | ICD-10-CM

## 2021-10-11 DIAGNOSIS — E03.9 ACQUIRED HYPOTHYROIDISM: ICD-10-CM

## 2021-10-11 PROCEDURE — 1170F FXNL STATUS ASSESSED: CPT | Performed by: FAMILY MEDICINE

## 2021-10-11 PROCEDURE — 1125F AMNT PAIN NOTED PAIN PRSNT: CPT | Performed by: FAMILY MEDICINE

## 2021-10-11 PROCEDURE — 1036F TOBACCO NON-USER: CPT | Performed by: FAMILY MEDICINE

## 2021-10-11 PROCEDURE — 3288F FALL RISK ASSESSMENT DOCD: CPT | Performed by: FAMILY MEDICINE

## 2021-10-11 PROCEDURE — 1160F RVW MEDS BY RX/DR IN RCRD: CPT | Performed by: FAMILY MEDICINE

## 2021-10-11 PROCEDURE — 3725F SCREEN DEPRESSION PERFORMED: CPT | Performed by: FAMILY MEDICINE

## 2021-10-11 PROCEDURE — G0439 PPPS, SUBSEQ VISIT: HCPCS | Performed by: FAMILY MEDICINE

## 2021-10-11 PROCEDURE — 99213 OFFICE O/P EST LOW 20 MIN: CPT | Performed by: FAMILY MEDICINE

## 2022-03-28 ENCOUNTER — RA CDI HCC (OUTPATIENT)
Dept: OTHER | Facility: HOSPITAL | Age: 87
End: 2022-03-28

## 2022-03-28 NOTE — PROGRESS NOTES
[facility]  coding opportunities       Chart reviewed, no opportunity found:   [address]        Patients Insurance     Medicare Insurance: [employer]

## 2022-04-11 ENCOUNTER — OFFICE VISIT (OUTPATIENT)
Dept: FAMILY MEDICINE CLINIC | Facility: CLINIC | Age: 87
End: 2022-04-11
Payer: COMMERCIAL

## 2022-04-11 VITALS
HEIGHT: 59 IN | SYSTOLIC BLOOD PRESSURE: 139 MMHG | HEART RATE: 65 BPM | DIASTOLIC BLOOD PRESSURE: 82 MMHG | BODY MASS INDEX: 28.63 KG/M2 | TEMPERATURE: 97.3 F | OXYGEN SATURATION: 97 % | WEIGHT: 142 LBS

## 2022-04-11 DIAGNOSIS — E78.00 HYPERCHOLESTEROLEMIA: Primary | ICD-10-CM

## 2022-04-11 DIAGNOSIS — E03.9 ACQUIRED HYPOTHYROIDISM: ICD-10-CM

## 2022-04-11 DIAGNOSIS — E66.3 OVERWEIGHT (BMI 25.0-29.9): ICD-10-CM

## 2022-04-11 PROCEDURE — 3725F SCREEN DEPRESSION PERFORMED: CPT | Performed by: FAMILY MEDICINE

## 2022-04-11 PROCEDURE — 1036F TOBACCO NON-USER: CPT | Performed by: FAMILY MEDICINE

## 2022-04-11 PROCEDURE — 1160F RVW MEDS BY RX/DR IN RCRD: CPT | Performed by: FAMILY MEDICINE

## 2022-04-11 PROCEDURE — 99214 OFFICE O/P EST MOD 30 MIN: CPT | Performed by: FAMILY MEDICINE

## 2022-04-11 RX ORDER — LEVOTHYROXINE SODIUM 0.07 MG/1
75 TABLET ORAL DAILY
Qty: 90 TABLET | Refills: 3 | Status: SHIPPED | OUTPATIENT
Start: 2022-04-11

## 2022-04-11 NOTE — PROGRESS NOTES
Assessment/Plan:    No problem-specific Assessment & Plan notes found for this encounter  Diagnoses and all orders for this visit:    Hypercholesterolemia  Comments:  no change in the medication  Orders:  -     CBC and differential; Future  -     Comprehensive metabolic panel; Future  -     Lipid panel; Future    Acquired hypothyroidism  -     CBC and differential; Future  -     TSH, 3rd generation with Free T4 reflex; Future  -     levothyroxine 75 mcg tablet; Take 1 tablet (75 mcg total) by mouth daily    Overweight (BMI 25 0-29  9)  Comments:  pt counseled on diet and exercise    Acquired hypothyroidism  Comments:  no change in the medication  Orders:  -     CBC and differential; Future  -     TSH, 3rd generation with Free T4 reflex; Future  -     levothyroxine 75 mcg tablet; Take 1 tablet (75 mcg total) by mouth daily          PHQ-2/9 Depression Screening    Little interest or pleasure in doing things: 0 - not at all  Feeling down, depressed, or hopeless: 0 - not at all  PHQ-2 Score: 0  PHQ-2 Interpretation: Negative depression screen        BMI Counseling: Body mass index is 28 68 kg/m²  The BMI is above normal  Nutrition recommendations include reducing portion sizes and 3-5 servings of fruits/vegetables daily  Exercise recommendations include moderate aerobic physical activity for 150 minutes/week and exercising 3-5 times per week  Subjective:      Patient ID: Silvestre Card is a 80 y o  female  Hypertension  This is a chronic problem  The current episode started more than 1 year ago  The problem is unchanged  The problem is controlled  Pertinent negatives include no chest pain, headaches or palpitations  There are no associated agents to hypertension  Risk factors for coronary artery disease include post-menopausal state  Past treatments include beta blockers  The current treatment provides moderate improvement  Compliance problems include exercise and diet          The following portions of the patient's history were reviewed and updated as appropriate: allergies, current medications, past family history, past medical history, past social history, past surgical history and problem list     Review of Systems   Eyes: Negative for visual disturbance  Cardiovascular: Negative for chest pain and palpitations  Neurological: Negative for headaches  Objective:    /82   Pulse 65   Temp (!) 97 3 °F (36 3 °C) (Tympanic)   Ht 4' 11" (1 499 m)   Wt 64 4 kg (142 lb)   SpO2 97%   BMI 28 68 kg/m²      Physical Exam  Vitals and nursing note reviewed  Constitutional:       General: She is not in acute distress  Appearance: Normal appearance  She is not ill-appearing, toxic-appearing or diaphoretic  HENT:      Head: Normocephalic and atraumatic  Eyes:      Conjunctiva/sclera: Conjunctivae normal    Cardiovascular:      Rate and Rhythm: Normal rate and regular rhythm  Heart sounds: Normal heart sounds  No murmur heard  Pulmonary:      Effort: Pulmonary effort is normal  No respiratory distress  Breath sounds: Normal breath sounds  No wheezing, rhonchi or rales  Abdominal:      General: There is no distension  Palpations: Abdomen is soft  There is no mass  Tenderness: There is no abdominal tenderness  There is no guarding or rebound  Musculoskeletal:      Cervical back: Normal range of motion and neck supple  No rigidity  Right lower leg: No edema  Left lower leg: No edema  Lymphadenopathy:      Cervical: No cervical adenopathy  Neurological:      Mental Status: She is alert and oriented to person, place, and time  Psychiatric:         Mood and Affect: Mood normal          Behavior: Behavior normal          Thought Content:  Thought content normal          Judgment: Judgment normal

## 2022-05-06 ENCOUNTER — OFFICE VISIT (OUTPATIENT)
Dept: CARDIOLOGY CLINIC | Facility: CLINIC | Age: 87
End: 2022-05-06
Payer: COMMERCIAL

## 2022-05-06 VITALS
SYSTOLIC BLOOD PRESSURE: 124 MMHG | OXYGEN SATURATION: 99 % | HEART RATE: 68 BPM | BODY MASS INDEX: 28.63 KG/M2 | HEIGHT: 59 IN | WEIGHT: 142 LBS | DIASTOLIC BLOOD PRESSURE: 82 MMHG

## 2022-05-06 DIAGNOSIS — I25.10 CORONARY ARTERY DISEASE INVOLVING NATIVE CORONARY ARTERY OF NATIVE HEART WITHOUT ANGINA PECTORIS: ICD-10-CM

## 2022-05-06 DIAGNOSIS — Z98.890 HISTORY OF RADIOFREQUENCY ABLATION (RFA) PROCEDURE FOR CARDIAC ARRHYTHMIA: ICD-10-CM

## 2022-05-06 DIAGNOSIS — I48.91 ATRIAL FIBRILLATION, UNSPECIFIED TYPE (HCC): ICD-10-CM

## 2022-05-06 DIAGNOSIS — I10 ESSENTIAL HYPERTENSION: ICD-10-CM

## 2022-05-06 DIAGNOSIS — R06.00 DYSPNEA ON EXERTION: ICD-10-CM

## 2022-05-06 DIAGNOSIS — E78.00 HYPERCHOLESTEROLEMIA: ICD-10-CM

## 2022-05-06 DIAGNOSIS — I47.1 SUPRAVENTRICULAR TACHYCARDIA (HCC): Primary | ICD-10-CM

## 2022-05-06 PROBLEM — R06.09 DYSPNEA ON EXERTION: Status: ACTIVE | Noted: 2022-05-06

## 2022-05-06 PROCEDURE — 99214 OFFICE O/P EST MOD 30 MIN: CPT | Performed by: INTERNAL MEDICINE

## 2022-05-06 PROCEDURE — 1160F RVW MEDS BY RX/DR IN RCRD: CPT | Performed by: INTERNAL MEDICINE

## 2022-05-06 PROCEDURE — 1036F TOBACCO NON-USER: CPT | Performed by: INTERNAL MEDICINE

## 2022-05-06 RX ORDER — ROSUVASTATIN CALCIUM 5 MG/1
5 TABLET, COATED ORAL DAILY
Qty: 30 TABLET | Refills: 3 | Status: SHIPPED | OUTPATIENT
Start: 2022-05-06

## 2022-05-06 RX ORDER — METOPROLOL SUCCINATE 25 MG/1
12 TABLET, EXTENDED RELEASE ORAL DAILY
Qty: 45 TABLET | Refills: 4 | Status: SHIPPED | OUTPATIENT
Start: 2022-05-06

## 2022-05-06 NOTE — PROGRESS NOTES
Cardiology Follow Up    Salome Bingham  1933  [id number]  [facility] [place]  [facility] [address]  [id number] [facility]  [phone]    1  Supraventricular tachycardia ([address] )     2  Coronary artery disease involving native coronary artery of native heart without angina pectoris     3  Essential hypertension     4  Hypercholesterolemia     5  History of radiofrequency ablation (RFA) procedure for cardiac arrhythmia     6  Dyspnea on exertion         Interval History:  Cardiology follow-up  Patient complains of crescendo dyspnea to moderate efforts over last year also  No orthopnea PND denies any associated chest discomfort  She also complains of feeling fatigued  She has been very sedentary over the winter  Stressed to be compliant low-cholesterol diet most recent lipid profile last year total cholesterol 199, HDL 75, , she has been intolerant to statin therapy in the past, she has generally intolerant to any medications  She is tolerating the low-dose aspirin every other day with less bruisability  She tries to be compliant with low-sodium diet  Denies any palpitations denies any syncope or presyncope  Patient Active Problem List   Diagnosis    Supraventricular tachycardia ([address] )    Coronary artery disease involving native coronary artery of native heart without angina pectoris    Hypercholesterolemia    Negative depression screening    History of radiofrequency ablation (RFA) procedure for cardiac arrhythmia    Overweight (BMI 25 0-29  9)    Medicare annual wellness visit, subsequent    Acquired hypothyroidism    Age related osteoporosis    Hyponatremia    Essential hypertension    Dyspnea on exertion     Past Medical History:   Diagnosis Date    Cardiac arrhythmia     Esophageal reflux     Hypertension     Hypothyroidism     Varicella-zoster infection      Social History     Socioeconomic History    Marital status:      Spouse name: Not on file    Number of children: 3    Years of education: Not on file    Highest education level: Not on file   Occupational History    Occupation: Organist for [employer]     Comment: Retired   Tobacco Use    Smoking status: Never Smoker    Smokeless tobacco: Never Used   Vaping Use    Vaping Use: Never used   Substance and Sexual Activity    Alcohol use: No    Drug use: No    Sexual activity: Not on file   Other Topics Concern    Not on file   Social History Narrative    No caffeine use     Social Determinants of Health     Financial Resource Strain: Not on file   Food Insecurity: Not on file   Transportation Needs: Not on file   Physical Activity: Not on file   Stress: Not on file   Social Connections: Not on file   Intimate Partner Violence: Not on file   Housing Stability: Not on file      Family History   Problem Relation Age of Onset    Heart disease Mother     Heart disease Father     COPD Father      Past Surgical History:   Procedure Laterality Date    CHOLECYSTECTOMY      COLON SURGERY      CORONARY ANGIOPLASTY      PARTIAL HYSTERECTOMY      SMALL INTESTINE SURGERY      Partial    TONSILLECTOMY  age 23       Current Outpatient Medications:     aspirin 81 MG tablet, Take 81 mg by mouth every other day , Disp: , Rfl:     cholecalciferol (VITAMIN D3) 1,000 units tablet, Take 4,000 Units by mouth daily , Disp: , Rfl:     Coenzyme Q10 (CO Q10) 100 MG CAPS, Take 1 capsule by mouth daily  , Disp: , Rfl:     levothyroxine 75 mcg tablet, Take 1 tablet (75 mcg total) by mouth daily, Disp: 90 tablet, Rfl: 3    metoprolol succinate (TOPROL-XL) 25 mg 24 hr tablet, Take 0 5 tablets (12 5 mg total) by mouth daily, Disp: 15 tablet, Rfl: 11    Multiple Vitamins-Minerals (RA VISION-ROCK PRESERVE PO), Take 1 tablet by mouth 2 (two) times a day , Disp: , Rfl:     Omega-3 Fatty Acids (FISH OIL PO), Take 1 capsule by mouth daily, Disp: , Rfl:    PREVIDENT 5000 PLUS 1 1 % CREA, daily , Disp: , Rfl: 0    hydrocortisone 2 5 % ointment, Apply topically 2 (two) times a day (Patient not taking: Reported on 10/11/2021), Disp: 30 g, Rfl: 0    omeprazole (PriLOSEC) 40 MG capsule, Take 40 mg by mouth daily, Disp: , Rfl:     valACYclovir (VALTREX) 1,000 mg tablet, Take 1 tablet (1,000 mg total) by mouth 3 (three) times a day for 7 days (Patient not taking: Reported on 5/4/2021), Disp: 21 tablet, Rfl: 0  Allergies   Allergen Reactions    Bextra [Valdecoxib]     Codeine Nausea Only, Headache and Tremor    Demerol [Meperidine] Nausea Only and Headache    Diovan [Valsartan]     Enalapril Cough    Lisinopril Cough    Penicillins     Percocet [Oxycodone-Acetaminophen]     Sulfa Antibiotics Abdominal Pain    Tramadol Nausea Only and Headache    Vicodin [Hydrocodone-Acetaminophen]     Zithromax [Azithromycin] Diarrhea    Zocor [Simvastatin] Hives       Labs:  No visits with results within 6 Month(s) from this visit     Latest known visit with results is:   Appointment on 10/05/2021   Component Date Value    WBC 10/05/2021 4 83     RBC 10/05/2021 4 58     Hemoglobin 10/05/2021 14 3     Hematocrit 10/05/2021 43 6     MCV 10/05/2021 95     MCH 10/05/2021 31 2     MCHC 10/05/2021 32 8     RDW 10/05/2021 13 2     MPV 10/05/2021 9 1     Platelets 24/60/5418 333     nRBC 10/05/2021 0     Neutrophils Relative 10/05/2021 41*    Immat GRANS % 10/05/2021 0     Lymphocytes Relative 10/05/2021 44     Monocytes Relative 10/05/2021 11     Eosinophils Relative 10/05/2021 3     Basophils Relative 10/05/2021 1     Neutrophils Absolute 10/05/2021 2 00     Immature Grans Absolute 10/05/2021 0 01     Lymphocytes Absolute 10/05/2021 2 10     Monocytes Absolute 10/05/2021 0 52     Eosinophils Absolute 10/05/2021 0 14     Basophils Absolute 10/05/2021 0 06     Sodium 10/05/2021 133*    Potassium 10/05/2021 3 9     Chloride 10/05/2021 103     CO2 10/05/2021 [place] 10/05/2021 3*    BUN 10/05/2021 17     Creatinine 10/05/2021 0 91     Glucose, Fasting 10/05/2021 103*    Calcium 10/05/2021 9 0     AST 10/05/2021 22     ALT 10/05/2021 29     Alkaline Phosphatase 10/05/2021 89     Total Protein 10/05/2021 7 7     Albumin 10/05/2021 3 5     Total Bilirubin 10/05/2021 0 86     eGFR 10/05/2021 57     Cholesterol 10/05/2021 199     Triglycerides 10/05/2021 59     HDL, Direct 10/05/2021 75     LDL Calculated 10/05/2021 112*    Non-HDL-Chol (CHOL-HDL) 10/05/2021 124     TSH 3RD GENERATON 10/05/2021 1 370      Imaging: No results found  Review of Systems:  Review of Systems   Constitutional: Positive for activity change and fatigue  HENT: Negative for hearing loss and nosebleeds  Eyes: Negative for visual disturbance  Respiratory: Positive for shortness of breath  Negative for apnea, chest tightness, wheezing and stridor  Cardiovascular: Positive for leg swelling  Negative for chest pain and palpitations  Gastrointestinal: Negative for abdominal pain, anal bleeding and blood in stool  Endocrine: Negative for cold intolerance  Genitourinary: Negative for hematuria  Musculoskeletal: Positive for arthralgias  Negative for gait problem and myalgias  Skin: Negative for pallor and rash  Allergic/Immunologic: Negative for immunocompromised state  Hematological: Bruises/bleeds easily  Psychiatric/Behavioral: Negative for sleep disturbance  The patient is not nervous/anxious  Physical Exam:  Physical Exam  Vitals reviewed  Constitutional:       General: She is not in acute distress  Appearance: She is well-developed and normal weight  She is not ill-appearing, toxic-appearing or diaphoretic  Comments: Appears younger than stated age   Neck:      Vascular: No JVD  Trachea: No tracheal deviation  Cardiovascular:      Rate and Rhythm: Normal rate and regular rhythm  No extrasystoles are present       Pulses: Normal pulses  No decreased pulses  Heart sounds: Normal heart sounds  No murmur heard  No friction rub  No gallop  Pulmonary:      Effort: Pulmonary effort is normal  No tachypnea, bradypnea, accessory muscle usage or respiratory distress  Breath sounds: Normal breath sounds  No stridor  No decreased breath sounds, wheezing, rhonchi or rales  Musculoskeletal:      Right lower leg: No edema  Left lower leg: Edema present  Skin:     General: Skin is warm and dry  Capillary Refill: Capillary refill takes less than 2 seconds  Coloration: Skin is not cyanotic  Findings: No ecchymosis or erythema  Neurological:      Mental Status: She is alert and oriented to person, place, and time  Psychiatric:         Mood and Affect: Mood normal          Discussion/Summary:  Coronary artery disease, PTCA stent of the LAD in 2008  Last stress test 2018, she did 4 minutes of Doug protocol, achieving target heart rate, there were no EKG criteria for ischemia symptoms, dyspnea appears to be angina equivalent possibility  Especially of the longstanding untreated dyslipidemia because of intolerance and unwillingness to take statin therapy  Appears to be more willing and will try low-dose rosuvastatin every other day followed by every day in 2 weeks  He is to let me know she has issues tolerating it  Symptoms are consistent or possibly consistent with angina equivalent, will do a pharmacological stress test as she will not be able to walk on a treadmill  As well as an echocardiogram   Left systolic function was previously normal on echocardiogram 7 years ago  Creatinine last year was normal, 0 9 GFR in the 50    This note was completed in part utilizing m-modal fluency direct voice recognition software     Grammatical errors, random word insertion, spelling mistakes, and incomplete sentences may be an occasional consequence of the system secondary to software limitations, ambient noise and hardware issues  At the time of dictation, efforts were made to edit, clarify and /or correct errors  Please read the chart carefully and recognize, using context, where substitutions have occurred  If you have any questions or concerns about the context, text or information contained within the body of this dictation, please contact myself, the provider, for further clarification

## 2022-05-17 ENCOUNTER — TELEPHONE (OUTPATIENT)
Dept: CARDIOLOGY CLINIC | Facility: CLINIC | Age: 87
End: 2022-05-17

## 2022-05-17 NOTE — TELEPHONE ENCOUNTER
Milton Calderon the daughter in law called   Advised that Hillary Dominguez took Crestor for three days even though was told to start every other day, but developed severe leg cramps from feet to groin, along with difficulty walking     She stopped taking it, doesn't want another statin  Also wanted to make aware that decided not to do the stress test, due to having an intolerance to dye  But will do the echo that was ordered as of now

## 2022-07-19 ENCOUNTER — HOSPITAL ENCOUNTER (OUTPATIENT)
Dept: NON INVASIVE DIAGNOSTICS | Facility: HOSPITAL | Age: 87
Discharge: HOME | End: 2022-07-19
Payer: COMMERCIAL

## 2022-07-19 VITALS
HEIGHT: 59 IN | HEART RATE: 76 BPM | DIASTOLIC BLOOD PRESSURE: 80 MMHG | SYSTOLIC BLOOD PRESSURE: 120 MMHG | BODY MASS INDEX: 28.22 KG/M2 | WEIGHT: 140 LBS

## 2022-07-19 DIAGNOSIS — I48.91 ATRIAL FIBRILLATION, UNSPECIFIED TYPE (HCC): ICD-10-CM

## 2022-07-19 DIAGNOSIS — R06.09 DYSPNEA ON EXERTION: ICD-10-CM

## 2022-07-19 DIAGNOSIS — I10 ESSENTIAL HYPERTENSION: ICD-10-CM

## 2022-07-19 DIAGNOSIS — I25.10 CORONARY ARTERY DISEASE INVOLVING NATIVE CORONARY ARTERY OF NATIVE HEART WITHOUT ANGINA PECTORIS: ICD-10-CM

## 2022-07-19 LAB
AORTIC ROOT: 3.7 CM
APICAL FOUR CHAMBER EJECTION FRACTION: 57 %
ASCENDING AORTA: 3 CM
AV LVOT MEAN GRADIENT: 2 MMHG
AV LVOT PEAK GRADIENT: 3 MMHG
DOP CALC LVOT PEAK VEL VTI: 22.03 CM
DOP CALC LVOT PEAK VEL: 0.9 M/S
DOP CALC RVOT PEAK VEL: 0.69 M/S
DOP CALC RVOT VTI: 13.51 CM
E WAVE DECELERATION TIME: 254 MS
E/A RATIO: 0.51
FRACTIONAL SHORTENING: 36 (ref 28–44)
INTERVENTRICULAR SEPTUM IN DIASTOLE (PARASTERNAL SHORT AXIS VIEW): 0.9 CM
INTERVENTRICULAR SEPTUM: 0.9 CM (ref 0.6–1.1)
LAAS-AP4: 14.4 CM2
LEFT ATRIUM SIZE: 3.6 CM
LEFT ATRIUM VOLUME INDEX (MOD BIPLANE): 22.8
LEFT INTERNAL DIMENSION IN SYSTOLE: 2.9 CM (ref 2.1–4)
LEFT VENTRICULAR INTERNAL DIMENSION IN DIASTOLE: 4.5 CM (ref 3.5–6)
LEFT VENTRICULAR POSTERIOR WALL IN END DIASTOLE: 0.8 CM
LEFT VENTRICULAR STROKE VOLUME: 60 ML
LVSV (TEICH): 60 ML
MV E'TISSUE VEL-SEP: 5 CM/S
MV PEAK A VEL: 0.93 M/S
MV PEAK E VEL: 47 CM/S
MV STENOSIS PRESSURE HALF TIME: 74 MS
MV VALVE AREA P 1/2 METHOD: 3
RIGHT VENTRICLE ID DIMENSION: 2.6 CM
SL CV LV EF: 60
SL CV PED ECHO LEFT VENTRICLE DIASTOLIC VOLUME (MOD BIPLANE) 2D: 91 ML
SL CV PED ECHO LEFT VENTRICLE SYSTOLIC VOLUME (MOD BIPLANE) 2D: 31 ML
SL CV RVOT MAX GRADIENT: 2 MMHG
SL CV RVOT MEAN GRADIENT: 1 MMHG
SL CV RVOT VMEAN: 0.47 M/S
TR MAX PG: 23 MMHG
TR PEAK VELOCITY: 2.4 M/S
TRICUSPID VALVE PEAK REGURGITATION VELOCITY: 2.41 M/S

## 2022-07-19 PROCEDURE — 93306 TTE W/DOPPLER COMPLETE: CPT

## 2022-07-19 PROCEDURE — 93306 TTE W/DOPPLER COMPLETE: CPT | Performed by: INTERNAL MEDICINE

## 2022-08-01 ENCOUNTER — TELEPHONE (OUTPATIENT)
Dept: CARDIOLOGY CLINIC | Facility: CLINIC | Age: 87
End: 2022-08-01

## 2022-09-26 ENCOUNTER — APPOINTMENT (OUTPATIENT)
Dept: LAB | Facility: CLINIC | Age: 87
End: 2022-09-26
Payer: COMMERCIAL

## 2022-09-26 DIAGNOSIS — I25.10 CORONARY ARTERY DISEASE INVOLVING NATIVE CORONARY ARTERY OF NATIVE HEART WITHOUT ANGINA PECTORIS: ICD-10-CM

## 2022-09-26 DIAGNOSIS — E78.00 HYPERCHOLESTEROLEMIA: ICD-10-CM

## 2022-09-26 DIAGNOSIS — E03.9 ACQUIRED HYPOTHYROIDISM: ICD-10-CM

## 2022-09-26 LAB
ALBUMIN SERPL BCP-MCNC: 3.5 G/DL (ref 3.5–5)
ALP SERPL-CCNC: 87 U/L (ref 46–116)
ALT SERPL W P-5'-P-CCNC: 28 U/L (ref 12–78)
ANION GAP SERPL CALCULATED.3IONS-SCNC: 7 MMOL/L (ref 4–13)
AST SERPL W P-5'-P-CCNC: 22 U/L (ref 5–45)
BASOPHILS # BLD AUTO: 0.04 THOUSANDS/ΜL (ref 0–0.1)
BASOPHILS NFR BLD AUTO: 1 % (ref 0–1)
BILIRUB SERPL-MCNC: 0.89 MG/DL (ref 0.2–1)
BUN SERPL-MCNC: 14 MG/DL (ref 5–25)
CALCIUM SERPL-MCNC: 8.9 MG/DL (ref 8.3–10.1)
CHLORIDE SERPL-SCNC: 102 MMOL/L (ref 96–108)
CHOLEST SERPL-MCNC: 183 MG/DL
CO2 SERPL-SCNC: 25 MMOL/L (ref 21–32)
CREAT SERPL-MCNC: 0.98 MG/DL (ref 0.6–1.3)
EOSINOPHIL # BLD AUTO: 0.14 THOUSAND/ΜL (ref 0–0.61)
EOSINOPHIL NFR BLD AUTO: 4 % (ref 0–6)
ERYTHROCYTE [DISTWIDTH] IN BLOOD BY AUTOMATED COUNT: 13.6 % (ref 11.6–15.1)
GFR SERPL CREATININE-BSD FRML MDRD: 51 ML/MIN/1.73SQ M
GLUCOSE P FAST SERPL-MCNC: 99 MG/DL (ref 65–99)
HCT VFR BLD AUTO: 43.1 % (ref 34.8–46.1)
HDLC SERPL-MCNC: 72 MG/DL
HGB BLD-MCNC: 14.5 G/DL (ref 11.5–15.4)
IMM GRANULOCYTES # BLD AUTO: 0 THOUSAND/UL (ref 0–0.2)
IMM GRANULOCYTES NFR BLD AUTO: 0 % (ref 0–2)
LDLC SERPL CALC-MCNC: 101 MG/DL (ref 0–100)
LYMPHOCYTES # BLD AUTO: 1.91 THOUSANDS/ΜL (ref 0.6–4.47)
LYMPHOCYTES NFR BLD AUTO: 47 % (ref 14–44)
MCH RBC QN AUTO: 31.2 PG (ref 26.8–34.3)
MCHC RBC AUTO-ENTMCNC: 33.6 G/DL (ref 31.4–37.4)
MCV RBC AUTO: 93 FL (ref 82–98)
MONOCYTES # BLD AUTO: 0.43 THOUSAND/ΜL (ref 0.17–1.22)
MONOCYTES NFR BLD AUTO: 11 % (ref 4–12)
NEUTROPHILS # BLD AUTO: 1.47 THOUSANDS/ΜL (ref 1.85–7.62)
NEUTS SEG NFR BLD AUTO: 37 % (ref 43–75)
NRBC BLD AUTO-RTO: 0 /100 WBCS
PLATELET # BLD AUTO: 313 THOUSANDS/UL (ref 149–390)
PMV BLD AUTO: 8.9 FL (ref 8.9–12.7)
POTASSIUM SERPL-SCNC: 4 MMOL/L (ref 3.5–5.3)
PROT SERPL-MCNC: 7.5 G/DL (ref 6.4–8.4)
RBC # BLD AUTO: 4.65 MILLION/UL (ref 3.81–5.12)
SODIUM SERPL-SCNC: 134 MMOL/L (ref 135–147)
TRIGL SERPL-MCNC: 52 MG/DL
TSH SERPL DL<=0.05 MIU/L-ACNC: 0.67 UIU/ML (ref 0.45–4.5)
WBC # BLD AUTO: 3.99 THOUSAND/UL (ref 4.31–10.16)

## 2022-09-26 PROCEDURE — 85025 COMPLETE CBC W/AUTO DIFF WBC: CPT

## 2022-09-26 PROCEDURE — 84443 ASSAY THYROID STIM HORMONE: CPT

## 2022-09-26 PROCEDURE — 80061 LIPID PANEL: CPT

## 2022-09-26 PROCEDURE — 80053 COMPREHEN METABOLIC PANEL: CPT

## 2022-09-26 PROCEDURE — 36415 COLL VENOUS BLD VENIPUNCTURE: CPT

## 2022-09-28 ENCOUNTER — OFFICE VISIT (OUTPATIENT)
Dept: FAMILY MEDICINE CLINIC | Facility: CLINIC | Age: 87
End: 2022-09-28
Payer: COMMERCIAL

## 2022-09-28 VITALS
WEIGHT: 136 LBS | OXYGEN SATURATION: 96 % | BODY MASS INDEX: 27.42 KG/M2 | SYSTOLIC BLOOD PRESSURE: 142 MMHG | HEART RATE: 61 BPM | HEIGHT: 59 IN | TEMPERATURE: 97.9 F | DIASTOLIC BLOOD PRESSURE: 72 MMHG

## 2022-09-28 DIAGNOSIS — S39.012A LUMBAR SPINE STRAIN, INITIAL ENCOUNTER: Primary | ICD-10-CM

## 2022-09-28 PROCEDURE — 99213 OFFICE O/P EST LOW 20 MIN: CPT | Performed by: FAMILY MEDICINE

## 2022-09-28 RX ORDER — CHLORHEXIDINE GLUCONATE 0.12 MG/ML
RINSE ORAL
COMMUNITY
Start: 2022-08-18

## 2022-09-28 NOTE — PROGRESS NOTES
Name: Ling Zuniga      : 1933      MRN: [id number]  Encounter Provider: Gerardo Guillermo MD  Encounter Date: 2022   Encounter department: [address] [place] [address]  Lumbar spine strain, initial encounter  -     Diclofenac Sodium (VOLTAREN) 1 %; Apply 2 g topically 4 (four) times a day        Depression Screening and Follow-up Plan: Patient was screened for depression during today's encounter  They screened negative with a PHQ-2 score of 0  Patient is pleasant and in NAD  Considering recent overuse with back exercises, holding of heavy child, and paraspinal muscle tenderness, lumbar muscle strain is likely  Trial of Voltaren Gel and will reevaluate  Subjective      Patient is an 79 y/o F presenting to the clinic with her daughter complaining of back pain in the Right lower region since Wednesday of last week  Patient denies any recent trauma, but did note that she was doing back exercises and lifting her granddaughter, and has been having pain since then  The pain was initially located in the right lower back, but has traveled to the left lower back as well  Patient says the pain is a 3/10, not alleviated or aggravated by anything, is gradually improving, and has not tried any medications for it as she "doesn't like to take medications " Patient denies any numbness/tingling in the anal area, any difficulties/changes with urination or bowel movements, no radiation to the back, and to radiation down the legs  Patient also denies any accompanying chest pains, shortness of breath, cough, fevers, chills, or recent illnesses        Back Pain  This is a new problem  The current episode started in the past 7 days  The problem occurs constantly  The problem has been gradually improving since onset  The pain is present in the lumbar spine  The pain does not radiate  The pain is at a severity of 3/10  The pain is mild   Pertinent negatives include no abdominal pain, bladder incontinence, bowel incontinence, chest pain, dysuria, fever, leg pain, numbness, perianal numbness or tingling  She has tried nothing for the symptoms  Review of Systems   Constitutional: Negative for chills and fever  HENT: Negative for ear pain and sore throat  Eyes: Negative for pain and visual disturbance  Respiratory: Negative for cough and shortness of breath  Cardiovascular: Negative for chest pain and palpitations  Gastrointestinal: Negative for abdominal pain, bowel incontinence, constipation, diarrhea and vomiting  Genitourinary: Negative for bladder incontinence, dysuria, hematuria and urgency  Musculoskeletal: Positive for back pain  Negative for neck pain  Lower back   Skin: Negative for color change and rash  Neurological: Negative for tingling, seizures, syncope and numbness  All other systems reviewed and are negative  Current Outpatient Medications on File Prior to Visit   Medication Sig    aspirin 81 MG tablet Take 81 mg by mouth every other day     chlorhexidine (PERIDEX) 0 12 % solution BRUSH ONCE AT NIGHT    cholecalciferol (VITAMIN D3) 1,000 units tablet Take 4,000 Units by mouth daily     Coenzyme Q10 (CO Q10) 100 MG CAPS Take 1 capsule by mouth daily      levothyroxine 75 mcg tablet Take 1 tablet (75 mcg total) by mouth daily    metoprolol succinate (TOPROL-XL) 25 mg 24 hr tablet Take 0 5 tablets (12 5 mg total) by mouth daily    Multiple Vitamins-Minerals (RA VISION-ROCK PRESERVE PO) Take 1 tablet by mouth 2 (two) times a day     Omega-3 Fatty Acids (FISH OIL PO) Take 1 capsule by mouth daily    PREVIDENT 5000 PLUS 1 1 % CREA daily     hydrocortisone 2 5 % ointment Apply topically 2 (two) times a day (Patient not taking: No sig reported)    omeprazole (PriLOSEC) 40 MG capsule Take 40 mg by mouth daily (Patient not taking: Reported on 6/17/2022)    rosuvastatin (CRESTOR) 5 mg tablet Take 1 tablet (5 mg total) by mouth daily (Patient not taking: No sig reported)    valACYclovir (VALTREX) 1,000 mg tablet Take 1 tablet (1,000 mg total) by mouth 3 (three) times a day for 7 days (Patient not taking: No sig reported)       Objective     /72 (BP Location: Left arm, Patient Position: Sitting, Cuff Size: Standard)   Pulse 61   Temp 97 9 °F (36 6 °C) (Tympanic)   Ht 4' 11" (1 499 m)   Wt 61 7 kg (136 lb)   SpO2 96%   BMI 27 47 kg/m²     Physical Exam  Vitals reviewed  Constitutional:       General: She is not in acute distress  Appearance: She is not ill-appearing  HENT:      Head: Normocephalic  Nose: Nose normal  No rhinorrhea  Eyes:      General: No scleral icterus  Cardiovascular:      Rate and Rhythm: Normal rate and regular rhythm  Pulses: Normal pulses  Heart sounds: Normal heart sounds  No murmur heard  Pulmonary:      Effort: Pulmonary effort is normal  No respiratory distress  Breath sounds: Normal breath sounds  No wheezing  Abdominal:      Palpations: Abdomen is soft  Tenderness: There is no abdominal tenderness  There is no right CVA tenderness or left CVA tenderness  Musculoskeletal:         General: No swelling  Lumbar back: Tenderness present  No swelling or edema  Negative right straight leg raise test and negative left straight leg raise test  No scoliosis  Right lower leg: No edema  Left lower leg: No edema  Comments: Right Lumbar point tenderness and tightness  Negative SLR   Skin:     General: Skin is warm  Coloration: Skin is not jaundiced  Neurological:      Mental Status: She is alert and oriented to person, place, and time  Psychiatric:         Mood and Affect: Mood normal          Behavior: Behavior normal          Thought Content:  Thought content normal          Judgment: Judgment normal        Kimani Grier MD

## 2022-10-17 ENCOUNTER — OFFICE VISIT (OUTPATIENT)
Dept: FAMILY MEDICINE CLINIC | Facility: CLINIC | Age: 87
End: 2022-10-17
Payer: COMMERCIAL

## 2022-10-17 VITALS
DIASTOLIC BLOOD PRESSURE: 76 MMHG | BODY MASS INDEX: 27.47 KG/M2 | SYSTOLIC BLOOD PRESSURE: 139 MMHG | TEMPERATURE: 97.9 F | HEART RATE: 61 BPM | WEIGHT: 136.25 LBS | OXYGEN SATURATION: 97 % | HEIGHT: 59 IN

## 2022-10-17 DIAGNOSIS — E03.9 ACQUIRED HYPOTHYROIDISM: ICD-10-CM

## 2022-10-17 DIAGNOSIS — Z00.00 MEDICARE ANNUAL WELLNESS VISIT, SUBSEQUENT: Primary | ICD-10-CM

## 2022-10-17 DIAGNOSIS — K21.9 GASTROESOPHAGEAL REFLUX DISEASE WITHOUT ESOPHAGITIS: ICD-10-CM

## 2022-10-17 DIAGNOSIS — E78.00 HYPERCHOLESTEROLEMIA: ICD-10-CM

## 2022-10-17 DIAGNOSIS — N18.31 STAGE 3A CHRONIC KIDNEY DISEASE (HCC): ICD-10-CM

## 2022-10-17 DIAGNOSIS — I10 ESSENTIAL HYPERTENSION: ICD-10-CM

## 2022-10-17 DIAGNOSIS — Z23 ENCOUNTER FOR IMMUNIZATION: ICD-10-CM

## 2022-10-17 PROCEDURE — 99214 OFFICE O/P EST MOD 30 MIN: CPT | Performed by: FAMILY MEDICINE

## 2022-10-17 PROCEDURE — 90662 IIV NO PRSV INCREASED AG IM: CPT

## 2022-10-17 PROCEDURE — G0008 ADMIN INFLUENZA VIRUS VAC: HCPCS

## 2022-10-17 PROCEDURE — G0439 PPPS, SUBSEQ VISIT: HCPCS | Performed by: FAMILY MEDICINE

## 2022-10-17 RX ORDER — LEVOTHYROXINE SODIUM 0.07 MG/1
75 TABLET ORAL DAILY
Qty: 90 TABLET | Refills: 3 | Status: SHIPPED | OUTPATIENT
Start: 2022-10-17

## 2022-10-17 NOTE — PATIENT INSTRUCTIONS
Medicare Preventive Visit Patient Instructions  Thank you for completing your Welcome to Medicare Visit or Medicare Annual Wellness Visit today  Your next wellness visit will be due in one year (10/18/2023)  The screening/preventive services that you may require over the next 5-10 years are detailed below  Some tests may not apply to you based off risk factors and/or age  Screening tests ordered at today's visit but not completed yet may show as past due  Also, please note that scanned in results may not display below  Preventive Screenings:  Service Recommendations Previous Testing/Comments   Colorectal Cancer Screening  * Colonoscopy    * Fecal Occult Blood Test (FOBT)/Fecal Immunochemical Test (FIT)  * Fecal DNA/Cologuard Test  * Flexible Sigmoidoscopy Age: 39-70 years old   Colonoscopy: every 10 years (may be performed more frequently if at higher risk)  OR  FOBT/FIT: every 1 year  OR  Cologuard: every 3 years  OR  Sigmoidoscopy: every 5 years  Screening may be recommended earlier than age 39 if at higher risk for colorectal cancer  Also, an individualized decision between you and your healthcare provider will decide whether screening between the ages of 74-80 would be appropriate  Colonoscopy: 10/12/2010  FOBT/FIT: Not on file  Cologuard: Not on file  Sigmoidoscopy: Not on file    Screening Not Indicated     Breast Cancer Screening Age: 36 years old  Frequency: every 1-2 years  Not required if history of left and right mastectomy Mammogram: Not on file        Cervical Cancer Screening Between the ages of 21-29, pap smear recommended once every 3 years  Between the ages of 33-67, can perform pap smear with HPV co-testing every 5 years     Recommendations may differ for women with a history of total hysterectomy, cervical cancer, or abnormal pap smears in past  Pap Smear: Not on file    Screening Not Indicated   Hepatitis C Screening Once for adults born between 1945 and 1965  More frequently in patients at high risk for Hepatitis C Hep C Antibody: Not on file        Diabetes Screening 1-2 times per year if you're at risk for diabetes or have pre-diabetes Fasting glucose: 99 mg/dL (9/26/2022)  A1C: No results in last 5 years (No results in last 5 years)  Screening Current   Cholesterol Screening Once every 5 years if you don't have a lipid disorder  May order more often based on risk factors  Lipid panel: 09/26/2022    Screening Not Indicated  History Lipid Disorder     Other Preventive Screenings Covered by Medicare:  1  Abdominal Aortic Aneurysm (AAA) Screening: covered once if your at risk  You're considered to be at risk if you have a family history of AAA  2  Lung Cancer Screening: covers low dose CT scan once per year if you meet all of the following conditions: (1) Age 50-69; (2) No signs or symptoms of lung cancer; (3) Current smoker or have quit smoking within the last 15 years; (4) You have a tobacco smoking history of at least 20 pack years (packs per day multiplied by number of years you smoked); (5) You get a written order from a healthcare provider  3  Glaucoma Screening: covered annually if you're considered high risk: (1) You have diabetes OR (2) Family history of glaucoma OR (3)  aged 48 and older OR (3)  American aged 72 and older  3  Osteoporosis Screening: covered every 2 years if you meet one of the following conditions: (1) You're estrogen deficient and at risk for osteoporosis based off medical history and other findings; (2) Have a vertebral abnormality; (3) On glucocorticoid therapy for more than 3 months; (4) Have primary hyperparathyroidism; (5) On osteoporosis medications and need to assess response to drug therapy  · Last bone density test (DXA Scan): 08/10/2016   5  HIV Screening: covered annually if you're between the age of 15-65  Also covered annually if you are younger than 13 and older than 72 with risk factors for HIV infection   For pregnant patients, it is covered up to 3 times per pregnancy  Immunizations:  Immunization Recommendations   Influenza Vaccine Annual influenza vaccination during flu season is recommended for all persons aged >= 6 months who do not have contraindications   Pneumococcal Vaccine   * Pneumococcal conjugate vaccine = PCV13 (Prevnar 13), PCV15 (Vaxneuvance), PCV20 (Prevnar 20)  * Pneumococcal polysaccharide vaccine = PPSV23 (Pneumovax) Adults 25-60 years old: 1-3 doses may be recommended based on certain risk factors  Adults 72 years old: 1-2 doses may be recommended based off what pneumonia vaccine you previously received   Hepatitis B Vaccine 3 dose series if at intermediate or high risk (ex: diabetes, end stage renal disease, liver disease)   Tetanus (Td) Vaccine - COST NOT COVERED BY MEDICARE PART B Following completion of primary series, a booster dose should be given every 10 years to maintain immunity against tetanus  Td may also be given as tetanus wound prophylaxis  Tdap Vaccine - COST NOT COVERED BY MEDICARE PART B Recommended at least once for all adults  For pregnant patients, recommended with each pregnancy  Shingles Vaccine (Shingrix) - COST NOT COVERED BY MEDICARE PART B  2 shot series recommended in those aged 48 and above     Health Maintenance Due:  There are no preventive care reminders to display for this patient  Immunizations Due:      Topic Date Due   • COVID-19 Vaccine (3 - Booster for Moderna series) 07/22/2021   • Influenza Vaccine (1) 09/01/2022     Advance Directives   What are advance directives? Advance directives are legal documents that state your wishes and plans for medical care  These plans are made ahead of time in case you lose your ability to make decisions for yourself  Advance directives can apply to any medical decision, such as the treatments you want, and if you want to donate organs  What are the types of advance directives?   There are many types of advance directives, and each state has rules about how to use them  You may choose a combination of any of the following:  · Living will: This is a written record of the treatment you want  You can also choose which treatments you do not want, which to limit, and which to stop at a certain time  This includes surgery, medicine, IV fluid, and tube feedings  · Durable power of  for healthcare [facility]): This is a written record that states who you want to make healthcare choices for you when you are unable to make them for yourself  This person, called a proxy, is usually a family member or a friend  You may choose more than 1 proxy  · Do not resuscitate (DNR) order:  A DNR order is used in case your heart stops beating or you stop breathing  It is a request not to have certain forms of treatment, such as CPR  A DNR order may be included in other types of advance directives  · Medical directive: This covers the care that you want if you are in a coma, near death, or unable to make decisions for yourself  You can list the treatments you want for each condition  Treatment may include pain medicine, surgery, blood transfusions, dialysis, IV or tube feedings, and a ventilator (breathing machine)  · Values history: This document has questions about your views, beliefs, and how you feel and think about life  This information can help others choose the care that you would choose  Why are advance directives important? An advance directive helps you control your care  Although spoken wishes may be used, it is better to have your wishes written down  Spoken wishes can be misunderstood, or not followed  Treatments may be given even if you do not want them  An advance directive may make it easier for your family to make difficult choices about your care  Urinary Incontinence   Urinary incontinence (UI)  is when you lose control of your bladder  UI develops because your bladder cannot store or empty urine properly   The 3 most common types of UI are stress incontinence, urge incontinence, or both  Medicines:   · May be given to help strengthen your bladder control  Report any side effects of medication to your healthcare provider  Do pelvic muscle exercises often:  Your pelvic muscles help you stop urinating  Squeeze these muscles tight for 5 seconds, then relax for 5 seconds  Gradually work up to squeezing for 10 seconds  Do 3 sets of 15 repetitions a day, or as directed  This will help strengthen your pelvic muscles and improve bladder control  Train your bladder:  Go to the bathroom at set times, such as every 2 hours, even if you do not feel the urge to go  You can also try to hold your urine when you feel the urge to go  For example, hold your urine for 5 minutes when you feel the urge to go  As that becomes easier, hold your urine for 10 minutes  Self-care:   · Keep a UI record  Write down how often you leak urine and how much you leak  Make a note of what you were doing when you leaked urine  · Drink liquids as directed  You may need to limit the amount of liquid you drink to help control your urine leakage  Do not drink any liquid right before you go to bed  Limit or do not have drinks that contain caffeine or alcohol  · Prevent constipation  Eat a variety of high-fiber foods  Good examples are high-fiber cereals, beans, vegetables, and whole-grain breads  Walking is the best way to trigger your intestines to have a bowel movement  · Exercise regularly and maintain a healthy weight  Weight loss and exercise will decrease pressure on your bladder and help you control your leakage  · Use a catheter as directed  to help empty your bladder  A catheter is a tiny, plastic tube that is put into your bladder to drain your urine  · Go to behavior therapy as directed  Behavior therapy may be used to help you learn to control your urge to urinate      Weight Management   Why it is important to manage your weight:  Being overweight increases your risk of health conditions such as heart disease, high blood pressure, type 2 diabetes, and certain types of cancer  It can also increase your risk for osteoarthritis, sleep apnea, and other respiratory problems  Aim for a slow, steady weight loss  Even a small amount of weight loss can lower your risk of health problems  How to lose weight safely:  A safe and healthy way to lose weight is to eat fewer calories and get regular exercise  You can lose up about 1 pound a week by decreasing the number of calories you eat by 500 calories each day  Healthy meal plan for weight management:  A healthy meal plan includes a variety of foods, contains fewer calories, and helps you stay healthy  A healthy meal plan includes the following:  · Eat whole-grain foods more often  A healthy meal plan should contain fiber  Fiber is the part of grains, fruits, and vegetables that is not broken down by your body  Whole-grain foods are healthy and provide extra fiber in your diet  Some examples of whole-grain foods are whole-wheat breads and pastas, oatmeal, brown rice, and bulgur  · Eat a variety of vegetables every day  Include dark, leafy greens such as spinach, kale, elana greens, and mustard greens  Eat yellow and orange vegetables such as carrots, sweet potatoes, and winter squash  · Eat a variety of fruits every day  Choose fresh or canned fruit (canned in its own juice or light syrup) instead of juice  Fruit juice has very little or no fiber  · Eat low-fat dairy foods  Drink fat-free (skim) milk or 1% milk  Eat fat-free yogurt and low-fat cottage cheese  Try low-fat cheeses such as mozzarella and other reduced-fat cheeses  · Choose meat and other protein foods that are low in fat  Choose beans or other legumes such as split peas or lentils  Choose fish, skinless poultry (chicken or turkey), or lean cuts of red meat (beef or pork)  Before you cook meat or poultry, cut off any visible fat     · Use less fat and oil   Try baking foods instead of frying them  Add less fat, such as margarine, sour cream, regular salad dressing and mayonnaise to foods  Eat fewer high-fat foods  Some examples of high-fat foods include french fries, doughnuts, ice cream, and cakes  · Eat fewer sweets  Limit foods and drinks that are high in sugar  This includes candy, cookies, regular soda, and sweetened drinks  Exercise:  Exercise at least 30 minutes per day on most days of the week  Some examples of exercise include walking, biking, dancing, and swimming  You can also fit in more physical activity by taking the stairs instead of the elevator or parking farther away from stores  Ask your healthcare provider about the best exercise plan for you  © Copyright [employer] 2018 Information is for End User's use only and may not be sold, redistributed or otherwise used for commercial purposes   All illustrations and images included in CareNotes® are the copyrighted property of [employer]  or [address] [place] [address]

## 2022-10-17 NOTE — PROGRESS NOTES
Assessment and Plan:     Problem List Items Addressed This Visit        Endocrine    Acquired hypothyroidism    Relevant Medications    levothyroxine 75 mcg tablet    Other Relevant Orders    TSH, 3rd generation with Free T4 reflex       Cardiovascular and Mediastinum    Essential hypertension       Genitourinary    Stage 3a chronic kidney disease ([address] )       Other    Hypercholesterolemia    Medicare annual wellness visit, subsequent - Primary      Other Visit Diagnoses     Gastroesophageal reflux disease without esophagitis               Preventive health issues were discussed with patient, and age appropriate screening tests were ordered as noted in patient's After Visit Summary  Personalized health advice and appropriate referrals for health education or preventive services given if needed, as noted in patient's After Visit Summary  History of Present Illness:     Patient presents for a Medicare Wellness Visit    Hypertension  This is a chronic problem  The current episode started more than 1 year ago  The problem is unchanged  The problem is controlled  Pertinent negatives include no chest pain, palpitations or shortness of breath  Patient Care Team:  Josue Bro DO as PCP - General  MD Josue Brunner DO Jearl Foy, MD     Review of Systems:     Review of Systems   Constitutional: Positive for fatigue  Negative for chills and fever  HENT: Negative  Negative for hearing loss  Eyes: Negative  Negative for visual disturbance  Respiratory: Negative for shortness of breath and wheezing  Cardiovascular: Negative for chest pain and palpitations  Gastrointestinal: Negative for abdominal pain, blood in stool, constipation, diarrhea, nausea and vomiting  Endocrine: Negative  Genitourinary: Negative for difficulty urinating and dysuria  Musculoskeletal: Negative for arthralgias and myalgias  Skin: Negative  Allergic/Immunologic: Negative  Neurological: Negative for seizures and syncope  Hematological: Negative for adenopathy  Psychiatric/Behavioral: Negative  Problem List:     Patient Active Problem List   Diagnosis   • Supraventricular tachycardia ([facility] )   • Coronary artery disease involving native coronary artery of native heart without angina pectoris   • Hypercholesterolemia   • Negative depression screening   • History of radiofrequency ablation (RFA) procedure for cardiac arrhythmia   • Overweight (BMI 25 0-29  9)   • Medicare annual wellness visit, subsequent   • Acquired hypothyroidism   • Age related osteoporosis   • Hyponatremia   • Essential hypertension   • Dyspnea on exertion   • Stage 3a chronic kidney disease ([facility] [address] )      Past Medical and Surgical History:     Past Medical History:   Diagnosis Date   • Cardiac arrhythmia    • Coronary artery disease    • Disease of thyroid gland    • Esophageal reflux    • GERD (gastroesophageal reflux disease)    • Hypertension    • Hypothyroidism    • Shingles 11-   • Varicella-zoster infection      Past Surgical History:   Procedure Laterality Date   • CHOLECYSTECTOMY     • COLON SURGERY     • CORONARY ANGIOPLASTY     • HYSTERECTOMY  1968   • PARTIAL HYSTERECTOMY     • SMALL INTESTINE SURGERY      Partial   • TONSILLECTOMY  age 23      Family History:     Family History   Problem Relation Age of Onset   • Heart disease Mother    • Heart disease Father    • COPD Father       Social History:     Social History     Socioeconomic History   • Marital status:       Spouse name: None   • Number of children: 3   • Years of education: None   • Highest education level: None   Occupational History   • 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   Tobacco Use   • Smoking status: Never Smoker   • Smokeless tobacco: Never Used   Vaping Use   • Vaping Use: Never used   Substance and Sexual Activity   • Alcohol use: No   • Drug use: No   • Sexual activity: Not Currently   Other Topics Concern   • None   Social History Narrative    No caffeine use     Social Determinants of Health     Financial Resource Strain: Low Risk    • Difficulty of Paying Living Expenses: Not hard at all   Food Insecurity: Not on file   Transportation Needs: No Transportation Needs   • Lack of Transportation (Medical): No   • Lack of Transportation (Non-Medical): No   Physical Activity: Not on file   Stress: Not on file   Social Connections: Not on file   Intimate Partner Violence: Not on file   Housing Stability: Not on file      Medications and Allergies:     Current Outpatient Medications   Medication Sig Dispense Refill   • aspirin 81 MG tablet Take 81 mg by mouth every other day      • chlorhexidine (PERIDEX) 0 12 % solution BRUSH ONCE AT NIGHT     • cholecalciferol (VITAMIN D3) 1,000 units tablet Take 4,000 Units by mouth daily      • Coenzyme Q10 (CO Q10) 100 MG CAPS Take 1 capsule by mouth daily       • levothyroxine 75 mcg tablet Take 1 tablet (75 mcg total) by mouth daily 90 tablet 3   • metoprolol succinate (TOPROL-XL) 25 mg 24 hr tablet Take 0 5 tablets (12 5 mg total) by mouth daily 45 tablet 4   • Multiple Vitamins-Minerals (RA VISION-ROCK PRESERVE PO) Take 1 tablet by mouth 2 (two) times a day      • Omega-3 Fatty Acids (FISH OIL PO) Take 1 capsule by mouth daily     • Diclofenac Sodium (VOLTAREN) 1 % Apply 2 g topically 4 (four) times a day (Patient not taking: Reported on 10/17/2022) 50 g 0   • hydrocortisone 2 5 % ointment Apply topically 2 (two) times a day (Patient not taking: No sig reported) 30 g 0   • omeprazole (PriLOSEC) 40 MG capsule Take 40 mg by mouth daily (Patient not taking: Reported on 6/17/2022)     • PREVIDENT 5000 PLUS 1 1 % CREA daily  (Patient not taking: Reported on 10/17/2022)  0   • rosuvastatin (CRESTOR) 5 mg tablet Take 1 tablet (5 mg total) by mouth daily (Patient not taking: No sig reported) 30 tablet 3   • valACYclovir (VALTREX) 1,000 mg tablet Take 1 tablet (1,000 mg total) by mouth 3 (three) times a day for 7 days (Patient not taking: No sig reported) 21 tablet 0     No current facility-administered medications for this visit  Allergies   Allergen Reactions   • Bextra [Valdecoxib]    • Codeine Nausea Only, Headache and Tremor   • Demerol [Meperidine] Nausea Only and Headache   • Diovan [Valsartan]    • Enalapril Cough   • Lisinopril Cough   • Penicillins    • Percocet [Oxycodone-Acetaminophen]    • Sulfa Antibiotics Abdominal Pain   • Tramadol Nausea Only and Headache   • Vicodin [Hydrocodone-Acetaminophen]    • Zithromax [Azithromycin] Diarrhea   • Zocor [Simvastatin] Hives      Immunizations:     Immunization History   Administered Date(s) Administered   • COVID-19 MODERNA VACC 0 5 ML IM 01/25/2021, 02/22/2021   • DT (pediatric) 06/07/2001   • INFLUENZA 12/12/2017   • Influenza, seasonal, injectable 10/20/2010   • Pneumococcal Conjugate 13-Valent 02/27/2018   • Pneumococcal Polysaccharide PPV23 02/09/2007   • Tdap 09/09/2015      Health Maintenance: There are no preventive care reminders to display for this patient  Topic Date Due   • COVID-19 Vaccine (3 - Booster for Moderna series) 07/22/2021   • Influenza Vaccine (1) 09/01/2022      Medicare Screening Tests and Risk Assessments:     Misha Lester is here for her Subsequent Wellness visit  Last Medicare Wellness visit information reviewed, patient interviewed and updates made to the record today  Health Risk Assessment:   Patient rates overall health as good  Patient feels that their physical health rating is same  Patient is satisfied with their life  Eyesight was rated as same  Hearing was rated as same  Patient feels that their emotional and mental health rating is same  Patients states they are never, rarely angry  Patient states they are sometimes unusually tired/fatigued  Pain experienced in the last 7 days has been none  Patient states that she has experienced no weight loss or gain in last 6 months  Depression Screening:   PHQ-2 Score: 0      Fall Risk Screening: In the past year, patient has experienced: no history of falling in past year      Urinary Incontinence Screening:   Patient has leaked urine accidently in the last six months  Home Safety:  Patient does not have trouble with stairs inside or outside of their home  Patient has working smoke alarms and has working carbon monoxide detector  Home safety hazards include: none  Nutrition:   Current diet is Regular  Medications:   Patient is currently taking over-the-counter supplements  OTC medications include: see medication list  Patient is able to manage medications  Activities of Daily Living (ADLs)/Instrumental Activities of Daily Living (IADLs):   Walk and transfer into and out of bed and chair?: Yes  Dress and groom yourself?: Yes    Bathe or shower yourself?: Yes    Feed yourself? Yes  Do your laundry/housekeeping?: Yes  Manage your money, pay your bills and track your expenses?: Yes  Make your own meals?: Yes    Do your own shopping?: Yes    Previous Hospitalizations:   Any hospitalizations or ED visits within the last 12 months?: No      Advance Care Planning:   Living will: Yes    Durable POA for healthcare:  Yes    Advanced directive: Yes    Advanced directive counseling given: No    Five wishes given: No    Patient declined ACP directive: No    End of Life Decisions reviewed with patient: Yes    Provider agrees with end of life decisions: Yes      Cognitive Screening:   Provider or family/friend/caregiver concerned regarding cognition?: No    PREVENTIVE SCREENINGS      Cardiovascular Screening:    General: Screening Not Indicated and History Lipid Disorder      Diabetes Screening:     General: Screening Current      Colorectal Cancer Screening:     General: Screening Not Indicated      Breast Cancer Screening:     General: Screening Not Indicated      Cervical Cancer Screening:    General: Screening Not Indicated Osteoporosis Screening:    General: Screening Not Indicated and History Osteoporosis      Abdominal Aortic Aneurysm (AAA) Screening:        General: Screening Not Indicated      Lung Cancer Screening:     General: Screening Not Indicated      Hepatitis C Screening:    General: Screening Not Indicated    Screening, Brief Intervention, and Referral to Treatment (SBIRT)    Screening  Typical number of drinks in a day: 0  Typical number of drinks in a week: 0  Interpretation: Low risk drinking behavior  Single Item Drug Screening:  How often have you used an illegal drug (including marijuana) or a prescription medication for non-medical reasons in the past year? never    Single Item Drug Screen Score: 0  Interpretation: Negative screen for possible drug use disorder    No exam data present     Physical Exam:     /76   Pulse 61   Temp 97 9 °F (36 6 °C) (Tympanic)   Ht 4' 11" (1 499 m)   Wt 61 8 kg (136 lb 4 oz)   SpO2 97%   BMI 27 52 kg/m²     Physical Exam  Vitals and nursing note reviewed  Constitutional:       General: She is not in acute distress  Appearance: Normal appearance  She is well-developed  She is not ill-appearing, toxic-appearing or diaphoretic  HENT:      Head: Normocephalic and atraumatic  Right Ear: Tympanic membrane, ear canal and external ear normal  There is no impacted cerumen  Left Ear: Tympanic membrane, ear canal and external ear normal  There is no impacted cerumen  Nose: Nose normal  No congestion or rhinorrhea  Mouth/Throat:      Mouth: Mucous membranes are moist       Pharynx: Oropharynx is clear  No oropharyngeal exudate or posterior oropharyngeal erythema  Eyes:      General:         Right eye: No discharge  Left eye: No discharge  Conjunctiva/sclera: Conjunctivae normal       Pupils: Pupils are equal, round, and reactive to light  Cardiovascular:      Rate and Rhythm: Normal rate and regular rhythm  Pulses: Normal pulses  Heart sounds: Normal heart sounds  No murmur heard  Pulmonary:      Effort: Pulmonary effort is normal  No respiratory distress  Breath sounds: Normal breath sounds  No wheezing, rhonchi or rales  Abdominal:      General: Abdomen is flat  There is no distension  Palpations: Abdomen is soft  There is no mass  Tenderness: There is no abdominal tenderness  There is no guarding  Hernia: No hernia is present  Musculoskeletal:         General: No deformity  Normal range of motion  Cervical back: Normal range of motion and neck supple  No rigidity  Right lower leg: No edema  Left lower leg: No edema  Lymphadenopathy:      Cervical: No cervical adenopathy  Skin:     General: Skin is warm and dry  Findings: No rash  Neurological:      General: No focal deficit present  Mental Status: She is alert and oriented to person, place, and time  Sensory: No sensory deficit  Motor: No weakness  Coordination: Coordination normal       Gait: Gait normal    Psychiatric:         Mood and Affect: Mood normal          Behavior: Behavior normal          Thought Content:  Thought content normal          Judgment: Judgment normal           Latanya Lebron DO

## 2022-10-20 ENCOUNTER — TELEPHONE (OUTPATIENT)
Dept: FAMILY MEDICINE CLINIC | Facility: CLINIC | Age: 87
End: 2022-10-20

## 2022-10-20 NOTE — TELEPHONE ENCOUNTER
Spoke to Dr Ritu Zuniga about pt concerns and he did speak to pt at her appt on Monday and told her that nephrology would not help with this situation and that pt should take over the counter pain medication  I called pt and spoke to the daughter and she states that she does remember him saying this at the appointment and that pt is no longer complaining about the pain but if she does will give her over the counter pain medication  I told pt's daughter to give us a call if there is anything that she needs

## 2022-10-20 NOTE — TELEPHONE ENCOUNTER
Patient called in and said she had discussed with you on Monday about pain in her kidneys  Patient is still in pain as of today and would like to have a referral placed to see Nephrology

## 2023-04-23 ENCOUNTER — RA CDI HCC (OUTPATIENT)
Dept: OTHER | Facility: HOSPITAL | Age: 88
End: 2023-04-23

## 2023-04-26 ENCOUNTER — APPOINTMENT (OUTPATIENT)
Dept: LAB | Facility: CLINIC | Age: 88
End: 2023-04-26

## 2023-04-26 DIAGNOSIS — E03.9 ACQUIRED HYPOTHYROIDISM: ICD-10-CM

## 2023-04-26 LAB — TSH SERPL DL<=0.05 MIU/L-ACNC: 1.19 UIU/ML (ref 0.45–4.5)

## 2023-05-01 ENCOUNTER — OFFICE VISIT (OUTPATIENT)
Dept: FAMILY MEDICINE CLINIC | Facility: CLINIC | Age: 88
End: 2023-05-01

## 2023-05-01 VITALS
BODY MASS INDEX: 27.78 KG/M2 | HEIGHT: 59 IN | OXYGEN SATURATION: 97 % | SYSTOLIC BLOOD PRESSURE: 139 MMHG | DIASTOLIC BLOOD PRESSURE: 84 MMHG | WEIGHT: 137.8 LBS | HEART RATE: 70 BPM | TEMPERATURE: 98.1 F

## 2023-05-01 DIAGNOSIS — E66.3 OVERWEIGHT (BMI 25.0-29.9): ICD-10-CM

## 2023-05-01 DIAGNOSIS — E03.9 ACQUIRED HYPOTHYROIDISM: Primary | ICD-10-CM

## 2023-05-01 DIAGNOSIS — N18.31 STAGE 3A CHRONIC KIDNEY DISEASE (HCC): ICD-10-CM

## 2023-05-01 DIAGNOSIS — E78.00 HYPERCHOLESTEROLEMIA: ICD-10-CM

## 2023-05-01 DIAGNOSIS — Z13.31 NEGATIVE DEPRESSION SCREENING: ICD-10-CM

## 2023-05-01 NOTE — PROGRESS NOTES
"Assessment/Plan:    No problem-specific Assessment & Plan notes found for this encounter  Diagnoses and all orders for this visit:    Acquired hypothyroidism  Comments:  no change in the medication  Orders:  -     TSH, 3rd generation with Free T4 reflex; Future    Hypercholesterolemia  Comments:  pt counseled on diet and exercise  Orders:  -     CBC and differential; Future  -     Lipid panel; Future    Stage 3a chronic kidney disease ([facility] [address] )  Comments:  per nephrology  Orders:  -     Comprehensive metabolic panel; Future    Negative depression screening    Overweight (BMI 25 0-29  9)  Comments:  pt counseled on diet and exercise          PHQ-2/9 Depression Screening    Little interest or pleasure in doing things: 0 - not at all  Feeling down, depressed, or hopeless: 0 - not at all  PHQ-2 Score: 0  PHQ-2 Interpretation: Negative depression screen            Subjective:      Patient ID: Luis Miguel Freitas is a 80 y o  female  Thyroid Problem  Presents for follow-up visit  Patient reports no cold intolerance, constipation, diarrhea, fatigue, heat intolerance, palpitations, visual change, weight gain or weight loss  The following portions of the patient's history were reviewed and updated as appropriate: allergies, current medications, past family history, past medical history, past social history, past surgical history and problem list     Review of Systems   Constitutional: Negative for fatigue, weight gain and weight loss  Eyes: Negative for visual disturbance  Cardiovascular: Negative for chest pain and palpitations  Gastrointestinal: Negative for constipation and diarrhea  Endocrine: Negative for cold intolerance and heat intolerance  Neurological: Negative for headaches  Objective:    /84   Pulse 70   Temp 98 1 °F (36 7 °C) (Tympanic)   Ht 4' 11\" (1 499 m)   Wt 62 5 kg (137 lb 12 8 oz)   SpO2 97%   BMI 27 83 kg/m²      Physical Exam  Vitals and nursing note reviewed   " Constitutional:       General: She is not in acute distress  Appearance: Normal appearance  She is not ill-appearing, toxic-appearing or diaphoretic  HENT:      Head: Normocephalic and atraumatic  Eyes:      Conjunctiva/sclera: Conjunctivae normal    Cardiovascular:      Rate and Rhythm: Normal rate and regular rhythm  Heart sounds: Normal heart sounds  No murmur heard  Pulmonary:      Effort: Pulmonary effort is normal  No respiratory distress  Breath sounds: Normal breath sounds  No wheezing, rhonchi or rales  Abdominal:      General: There is no distension  Palpations: Abdomen is soft  There is no mass  Tenderness: There is no abdominal tenderness  There is no guarding or rebound  Musculoskeletal:      Cervical back: Normal range of motion and neck supple  No rigidity  Right lower leg: No edema  Left lower leg: No edema  Lymphadenopathy:      Cervical: No cervical adenopathy  Neurological:      Mental Status: She is alert and oriented to person, place, and time  Psychiatric:         Mood and Affect: Mood normal          Behavior: Behavior normal          Thought Content:  Thought content normal          Judgment: Judgment normal

## 2023-05-19 ENCOUNTER — OFFICE VISIT (OUTPATIENT)
Dept: CARDIOLOGY CLINIC | Facility: CLINIC | Age: 88
End: 2023-05-19

## 2023-05-19 VITALS
SYSTOLIC BLOOD PRESSURE: 160 MMHG | DIASTOLIC BLOOD PRESSURE: 80 MMHG | OXYGEN SATURATION: 93 % | WEIGHT: 139 LBS | HEART RATE: 70 BPM | BODY MASS INDEX: 28.07 KG/M2

## 2023-05-19 DIAGNOSIS — I25.10 CORONARY ARTERY DISEASE INVOLVING NATIVE CORONARY ARTERY OF NATIVE HEART WITHOUT ANGINA PECTORIS: Primary | ICD-10-CM

## 2023-05-19 DIAGNOSIS — E78.00 HYPERCHOLESTEROLEMIA: ICD-10-CM

## 2023-05-19 DIAGNOSIS — R06.09 DYSPNEA ON EXERTION: ICD-10-CM

## 2023-05-19 DIAGNOSIS — I10 ESSENTIAL HYPERTENSION: ICD-10-CM

## 2023-05-19 DIAGNOSIS — Z98.890 HISTORY OF RADIOFREQUENCY ABLATION (RFA) PROCEDURE FOR CARDIAC ARRHYTHMIA: ICD-10-CM

## 2023-05-19 NOTE — PROGRESS NOTES
Cardiology Follow Up    Adrian Ríos  8/15/1933  [id number]  [address] [facility],[place] [address] CATH LAB  [facility] [place] [address] [place] [address]  [phone]    1  Coronary artery disease involving native coronary artery of native heart without angina pectoris        2  Essential hypertension        3  Hypercholesterolemia        4  History of radiofrequency ablation (RFA) procedure for cardiac arrhythmia        5  Dyspnea on exertion            Interval History: Cardiology follow-up  Patient states been compliant with low-cholesterol diet, he has been intolerant to statins in the past, most recent trial with rosuvastatin was unsuccessful  Given on a very low-dose  Lipids last year total cholesterol 182, HDL 72,   Denies any palpitations, no syncope or presyncope  Compliant with low-sodium diet, blood pressures been well controlled  Patient Active Problem List   Diagnosis   • Coronary artery disease involving native coronary artery of native heart without angina pectoris   • Hypercholesterolemia   • Negative depression screening   • History of radiofrequency ablation (RFA) procedure for cardiac arrhythmia   • Overweight (BMI 25 0-29  9)   • Medicare annual wellness visit, subsequent   • Acquired hypothyroidism   • Age related osteoporosis   • Hyponatremia   • Essential hypertension   • Dyspnea on exertion   • Stage 3a chronic kidney disease ([facility] [address] )     Past Medical History:   Diagnosis Date   • Cardiac arrhythmia    • Coronary artery disease    • Disease of thyroid gland    • Esophageal reflux    • GERD (gastroesophageal reflux disease)    • Hypertension    • Hypothyroidism    • Shingles 11-   • Supraventricular tachycardia ([facility] [address] ) 7/27/2016   • Varicella-zoster infection      Social History     Socioeconomic History   • Marital status:       Spouse name: Not on file   • Number of children: 3   • Years of education: Not on file   • Highest education level: Not on file   Occupational History   • 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   Tobacco Use   • Smoking status: Never   • Smokeless tobacco: Never   Vaping Use   • Vaping Use: Never used   Substance and Sexual Activity   • Alcohol use: No   • Drug use: No   • Sexual activity: Not Currently   Other Topics Concern   • Not on file   Social History Narrative    No caffeine use     Social Determinants of Health     Financial Resource Strain: Low Risk    • Difficulty of Paying Living Expenses: Not hard at all   Food Insecurity: Not on file   Transportation Needs: No Transportation Needs   • Lack of Transportation (Medical): No   • Lack of Transportation (Non-Medical): No   Physical Activity: Not on file   Stress: Not on file   Social Connections: Not on file   Intimate Partner Violence: Not on file   Housing Stability: Not on file      Family History   Problem Relation Age of Onset   • Heart disease Mother    • Heart disease Father    • COPD Father      Past Surgical History:   Procedure Laterality Date   • CHOLECYSTECTOMY     • COLON SURGERY     • CORONARY ANGIOPLASTY     • HYSTERECTOMY  1968   • PARTIAL HYSTERECTOMY     • SMALL INTESTINE SURGERY      Partial   • TONSILLECTOMY  age 23       Current Outpatient Medications:   •  aspirin 81 MG tablet, Take 81 mg by mouth every other day , Disp: , Rfl:   •  Coenzyme Q10 (CO Q10) 100 MG CAPS, Take 1 capsule by mouth daily  , Disp: , Rfl:   •  levothyroxine 75 mcg tablet, Take 1 tablet (75 mcg total) by mouth daily, Disp: 90 tablet, Rfl: 3  •  metoprolol succinate (TOPROL-XL) 25 mg 24 hr tablet, Take 0 5 tablets (12 5 mg total) by mouth daily, Disp: 45 tablet, Rfl: 4  •  Multiple Vitamins-Minerals (RA VISION-ROCK PRESERVE PO), Take 1 tablet by mouth 2 (two) times a day , Disp: , Rfl:   •  chlorhexidine (PERIDEX) 0 12 % solution, BRUSH ONCE AT NIGHT (Patient not taking: Reported on 5/1/2023), Disp: , Rfl:   •  cholecalciferol (VITAMIN D3) 1,000 units tablet, Take 4,000 Units by mouth daily  (Patient not taking: Reported on 5/1/2023), Disp: , Rfl:   •  Diclofenac Sodium (VOLTAREN) 1 %, Apply 2 g topically 4 (four) times a day (Patient not taking: Reported on 10/17/2022), Disp: 50 g, Rfl: 0  •  hydrocortisone 2 5 % ointment, Apply topically 2 (two) times a day (Patient not taking: Reported on 10/11/2021), Disp: 30 g, Rfl: 0  •  Omega-3 Fatty Acids (FISH OIL PO), Take 1 capsule by mouth daily (Patient not taking: Reported on 5/19/2023), Disp: , Rfl:   •  omeprazole (PriLOSEC) 40 MG capsule, Take 40 mg by mouth daily (Patient not taking: Reported on 6/17/2022), Disp: , Rfl:   •  PREVIDENT 5000 PLUS 1 1 % CREA, daily  (Patient not taking: Reported on 10/17/2022), Disp: , Rfl: 0  •  rosuvastatin (CRESTOR) 5 mg tablet, Take 1 tablet (5 mg total) by mouth daily (Patient not taking: Reported on 5/27/2022), Disp: 30 tablet, Rfl: 3  •  valACYclovir (VALTREX) 1,000 mg tablet, Take 1 tablet (1,000 mg total) by mouth 3 (three) times a day for 7 days (Patient not taking: Reported on 5/4/2021), Disp: 21 tablet, Rfl: 0  Allergies   Allergen Reactions   • Bextra [Valdecoxib]    • Codeine Nausea Only, Headache and Tremor   • Demerol [Meperidine] Nausea Only and Headache   • Diovan [Valsartan]    • Enalapril Cough   • Lisinopril Cough   • Penicillins    • Percocet [Oxycodone-Acetaminophen]    • Sulfa Antibiotics Abdominal Pain   • Tramadol Nausea Only and Headache   • Vicodin [Hydrocodone-Acetaminophen]    • Zithromax [Azithromycin] Diarrhea   • Zocor [Simvastatin] Hives       Labs:  Appointment on 04/26/2023   Component Date Value   • TSH 3RD GENERATON 04/26/2023 1 190      Imaging: No results found  Review of Systems:  Review of Systems   Constitutional: Negative for activity change, diaphoresis, fatigue and fever  HENT: Negative for nosebleeds  Eyes: Negative for visual disturbance  Respiratory: Positive for shortness of breath   Negative for apnea, wheezing and stridor  Cardiovascular: Negative for chest pain, palpitations and leg swelling  Gastrointestinal: Negative for abdominal pain, anal bleeding and blood in stool  Endocrine: Negative for cold intolerance  Genitourinary: Negative for hematuria  Musculoskeletal: Positive for arthralgias  Negative for gait problem and myalgias  Skin: Negative for pallor and rash  Allergic/Immunologic: Negative for immunocompromised state  Neurological: Negative for dizziness and syncope  Hematological: Does not bruise/bleed easily  Psychiatric/Behavioral: Negative for sleep disturbance  The patient is not nervous/anxious  Physical Exam:  Physical Exam  Vitals reviewed  Constitutional:       General: She is not in acute distress  Appearance: Normal appearance  She is normal weight  She is not ill-appearing, toxic-appearing or diaphoretic  Eyes:      General: No scleral icterus  Neck:      Vascular: No carotid bruit  Cardiovascular:      Rate and Rhythm: Normal rate and regular rhythm  Pulses: Normal pulses  Heart sounds: Normal heart sounds  No murmur heard  No friction rub  No gallop  Pulmonary:      Effort: Pulmonary effort is normal  No respiratory distress  Breath sounds: Normal breath sounds  No stridor  No wheezing, rhonchi or rales  Musculoskeletal:      Right lower leg: No edema  Left lower leg: No edema  Skin:     General: Skin is warm and dry  Capillary Refill: Capillary refill takes less than 2 seconds  Coloration: Skin is not jaundiced or pale  Findings: No bruising or erythema  Neurological:      Mental Status: She is alert and oriented to person, place, and time  Psychiatric:         Mood and Affect: Mood normal          Discussion/Summary:  Coronary artery disease, PTCA stent of the LAD in 2008   Last stress test 2018, she did 4 minutes of Doug protocol, achieving target heart rate, there were no EKG criteria for ischemia symptoms, dyspnea appears to be angina equivalent possibility  Especially of the longstanding untreated dyslipidemia because of intolerance and unwillingness to take statin therapy  Appeared to be more willing and will try low-dose rosuvastatin every other day followed by every day in 2 weeks  Even at that regimen she could not tolerate it  Symptoms are consistent or possibly consistent with angina equivalent, will did order a pharmacological stress test as she will not be able to walk on a treadmill  She did not proceed with testing  As well as an echocardiogram   2022 revealed normal left ventricular systolic function with stage I diastolic dysfunction interestingly so, there was mild left atrial enlargement and mild insufficiency mild mitral and tricuspid efficiency with estimated normal pulmonary pressures suggested by the criteria, aortic root was described as mildly dilated measuring 37 mm which I think is an overcall  Creatinine last year was normal, 0 9 GFR in the 50    This note was completed in part utilizing [employer] direct voice recognition software  Grammatical errors, random word insertion, spelling mistakes, and incomplete sentences may be an occasional consequence of the system secondary to software limitations, ambient noise and hardware issues  At the time of dictation, efforts were made to edit, clarify and /or correct errors  Please read the chart carefully and recognize, using context, where substitutions have occurred  If you have any questions or concerns about the context, text or information contained within the body of this dictation, please contact myself, the provider, for further clarification

## 2023-07-22 DIAGNOSIS — I48.91 ATRIAL FIBRILLATION, UNSPECIFIED TYPE (HCC): ICD-10-CM

## 2023-07-24 RX ORDER — METOPROLOL SUCCINATE 25 MG/1
TABLET, EXTENDED RELEASE ORAL
Qty: 45 TABLET | Refills: 0 | Status: SHIPPED | OUTPATIENT
Start: 2023-07-24

## 2023-10-24 DIAGNOSIS — E03.9 ACQUIRED HYPOTHYROIDISM: ICD-10-CM

## 2023-10-24 RX ORDER — LEVOTHYROXINE SODIUM 0.07 MG/1
75 TABLET ORAL DAILY
Qty: 90 TABLET | Refills: 0 | Status: SHIPPED | OUTPATIENT
Start: 2023-10-24

## 2023-10-26 ENCOUNTER — APPOINTMENT (OUTPATIENT)
Dept: LAB | Facility: CLINIC | Age: 88
End: 2023-10-26
Payer: COMMERCIAL

## 2023-10-26 DIAGNOSIS — N18.31 STAGE 3A CHRONIC KIDNEY DISEASE (HCC): ICD-10-CM

## 2023-10-26 DIAGNOSIS — E03.9 ACQUIRED HYPOTHYROIDISM: ICD-10-CM

## 2023-10-26 DIAGNOSIS — E78.00 HYPERCHOLESTEROLEMIA: ICD-10-CM

## 2023-10-26 LAB
ALBUMIN SERPL BCP-MCNC: 4.2 G/DL (ref 3.5–5)
ALP SERPL-CCNC: 88 U/L (ref 34–104)
ALT SERPL W P-5'-P-CCNC: 19 U/L (ref 7–52)
ANION GAP SERPL CALCULATED.3IONS-SCNC: 10 MMOL/L
AST SERPL W P-5'-P-CCNC: 26 U/L (ref 13–39)
BASOPHILS # BLD AUTO: 0.05 THOUSANDS/ÂΜL (ref 0–0.1)
BASOPHILS NFR BLD AUTO: 1 % (ref 0–1)
BILIRUB SERPL-MCNC: 0.84 MG/DL (ref 0.2–1)
BUN SERPL-MCNC: 23 MG/DL (ref 5–25)
CALCIUM SERPL-MCNC: 9.4 MG/DL (ref 8.4–10.2)
CHLORIDE SERPL-SCNC: 99 MMOL/L (ref 96–108)
CHOLEST SERPL-MCNC: 196 MG/DL
CO2 SERPL-SCNC: 25 MMOL/L (ref 21–32)
CREAT SERPL-MCNC: 1.19 MG/DL (ref 0.6–1.3)
EOSINOPHIL # BLD AUTO: 0.09 THOUSAND/ÂΜL (ref 0–0.61)
EOSINOPHIL NFR BLD AUTO: 2 % (ref 0–6)
ERYTHROCYTE [DISTWIDTH] IN BLOOD BY AUTOMATED COUNT: 13.5 % (ref 11.6–15.1)
GFR SERPL CREATININE-BSD FRML MDRD: 40 ML/MIN/1.73SQ M
GLUCOSE P FAST SERPL-MCNC: 95 MG/DL (ref 65–99)
HCT VFR BLD AUTO: 43.5 % (ref 34.8–46.1)
HDLC SERPL-MCNC: 74 MG/DL
HGB BLD-MCNC: 14.7 G/DL (ref 11.5–15.4)
IMM GRANULOCYTES # BLD AUTO: 0.02 THOUSAND/UL (ref 0–0.2)
IMM GRANULOCYTES NFR BLD AUTO: 0 % (ref 0–2)
LDLC SERPL CALC-MCNC: 109 MG/DL (ref 0–100)
LYMPHOCYTES # BLD AUTO: 1.87 THOUSANDS/ÂΜL (ref 0.6–4.47)
LYMPHOCYTES NFR BLD AUTO: 37 % (ref 14–44)
MCH RBC QN AUTO: 32.1 PG (ref 26.8–34.3)
MCHC RBC AUTO-ENTMCNC: 33.8 G/DL (ref 31.4–37.4)
MCV RBC AUTO: 95 FL (ref 82–98)
MONOCYTES # BLD AUTO: 0.56 THOUSAND/ÂΜL (ref 0.17–1.22)
MONOCYTES NFR BLD AUTO: 11 % (ref 4–12)
NEUTROPHILS # BLD AUTO: 2.47 THOUSANDS/ÂΜL (ref 1.85–7.62)
NEUTS SEG NFR BLD AUTO: 49 % (ref 43–75)
NONHDLC SERPL-MCNC: 122 MG/DL
NRBC BLD AUTO-RTO: 0 /100 WBCS
PLATELET # BLD AUTO: 333 THOUSANDS/UL (ref 149–390)
PMV BLD AUTO: 9.2 FL (ref 8.9–12.7)
POTASSIUM SERPL-SCNC: 4.5 MMOL/L (ref 3.5–5.3)
PROT SERPL-MCNC: 7.8 G/DL (ref 6.4–8.4)
RBC # BLD AUTO: 4.58 MILLION/UL (ref 3.81–5.12)
SODIUM SERPL-SCNC: 134 MMOL/L (ref 135–147)
TRIGL SERPL-MCNC: 63 MG/DL
TSH SERPL DL<=0.05 MIU/L-ACNC: 4.36 UIU/ML (ref 0.45–4.5)
WBC # BLD AUTO: 5.06 THOUSAND/UL (ref 4.31–10.16)

## 2023-10-26 PROCEDURE — 36415 COLL VENOUS BLD VENIPUNCTURE: CPT

## 2023-10-26 PROCEDURE — 85025 COMPLETE CBC W/AUTO DIFF WBC: CPT

## 2023-10-26 PROCEDURE — 80053 COMPREHEN METABOLIC PANEL: CPT

## 2023-10-26 PROCEDURE — 84443 ASSAY THYROID STIM HORMONE: CPT

## 2023-10-26 PROCEDURE — 80061 LIPID PANEL: CPT

## 2023-11-06 ENCOUNTER — OFFICE VISIT (OUTPATIENT)
Dept: FAMILY MEDICINE CLINIC | Facility: CLINIC | Age: 88
End: 2023-11-06
Payer: COMMERCIAL

## 2023-11-06 VITALS
BODY MASS INDEX: 27.87 KG/M2 | TEMPERATURE: 96.9 F | HEART RATE: 71 BPM | SYSTOLIC BLOOD PRESSURE: 130 MMHG | DIASTOLIC BLOOD PRESSURE: 70 MMHG | HEIGHT: 59 IN | WEIGHT: 138.25 LBS | OXYGEN SATURATION: 97 %

## 2023-11-06 DIAGNOSIS — Z00.00 MEDICARE ANNUAL WELLNESS VISIT, SUBSEQUENT: Primary | ICD-10-CM

## 2023-11-06 DIAGNOSIS — E03.9 ACQUIRED HYPOTHYROIDISM: ICD-10-CM

## 2023-11-06 DIAGNOSIS — E66.3 OVERWEIGHT (BMI 25.0-29.9): ICD-10-CM

## 2023-11-06 DIAGNOSIS — I10 ESSENTIAL HYPERTENSION: ICD-10-CM

## 2023-11-06 DIAGNOSIS — Z23 ENCOUNTER FOR IMMUNIZATION: ICD-10-CM

## 2023-11-06 DIAGNOSIS — N18.31 STAGE 3A CHRONIC KIDNEY DISEASE (HCC): ICD-10-CM

## 2023-11-06 PROCEDURE — G0439 PPPS, SUBSEQ VISIT: HCPCS | Performed by: FAMILY MEDICINE

## 2023-11-06 PROCEDURE — 99214 OFFICE O/P EST MOD 30 MIN: CPT | Performed by: FAMILY MEDICINE

## 2023-11-06 NOTE — PATIENT INSTRUCTIONS
Medicare Preventive Visit Patient Instructions  Thank you for completing your Welcome to Medicare Visit or Medicare Annual Wellness Visit today. Your next wellness visit will be due in one year (11/6/2024). The screening/preventive services that you may require over the next 5-10 years are detailed below. Some tests may not apply to you based off risk factors and/or age. Screening tests ordered at today's visit but not completed yet may show as past due. Also, please note that scanned in results may not display below. Preventive Screenings:  Service Recommendations Previous Testing/Comments   Colorectal Cancer Screening  * Colonoscopy    * Fecal Occult Blood Test (FOBT)/Fecal Immunochemical Test (FIT)  * Fecal DNA/Cologuard Test  * Flexible Sigmoidoscopy Age: 43-73 years old   Colonoscopy: every 10 years (may be performed more frequently if at higher risk)  OR  FOBT/FIT: every 1 year  OR  Cologuard: every 3 years  OR  Sigmoidoscopy: every 5 years  Screening may be recommended earlier than age 39 if at higher risk for colorectal cancer. Also, an individualized decision between you and your healthcare provider will decide whether screening between the ages of 77-80 would be appropriate. Colonoscopy: 10/12/2010  FOBT/FIT: Not on file  Cologuard: Not on file  Sigmoidoscopy: Not on file    Screening Not Indicated     Breast Cancer Screening Age: 36 years old  Frequency: every 1-2 years  Not required if history of left and right mastectomy Mammogram: Not on file        Cervical Cancer Screening Between the ages of 21-29, pap smear recommended once every 3 years. Between the ages of 32-69, can perform pap smear with HPV co-testing every 5 years.    Recommendations may differ for women with a history of total hysterectomy, cervical cancer, or abnormal pap smears in past. Pap Smear: Not on file    Screening Not Indicated   Hepatitis C Screening Once for adults born between 1945 and 1965  More frequently in patients at high risk for Hepatitis C Hep C Antibody: Not on file        Diabetes Screening 1-2 times per year if you're at risk for diabetes or have pre-diabetes Fasting glucose: 95 mg/dL (10/26/2023)  A1C: No results in last 5 years (No results in last 5 years)  Screening Current   Cholesterol Screening Once every 5 years if you don't have a lipid disorder. May order more often based on risk factors. Lipid panel: 10/26/2023    Screening Not Indicated  History Lipid Disorder     Other Preventive Screenings Covered by Medicare:  Abdominal Aortic Aneurysm (AAA) Screening: covered once if your at risk. You're considered to be at risk if you have a family history of AAA. Lung Cancer Screening: covers low dose CT scan once per year if you meet all of the following conditions: (1) Age 48-67; (2) No signs or symptoms of lung cancer; (3) Current smoker or have quit smoking within the last 15 years; (4) You have a tobacco smoking history of at least 20 pack years (packs per day multiplied by number of years you smoked); (5) You get a written order from a healthcare provider. Glaucoma Screening: covered annually if you're considered high risk: (1) You have diabetes OR (2) Family history of glaucoma OR (3)  aged 48 and older OR (3)  American aged 72 and older  Osteoporosis Screening: covered every 2 years if you meet one of the following conditions: (1) You're estrogen deficient and at risk for osteoporosis based off medical history and other findings; (2) Have a vertebral abnormality; (3) On glucocorticoid therapy for more than 3 months; (4) Have primary hyperparathyroidism; (5) On osteoporosis medications and need to assess response to drug therapy. Last bone density test (DXA Scan): 08/10/2016. HIV Screening: covered annually if you're between the age of 14-79. Also covered annually if you are younger than 13 and older than 72 with risk factors for HIV infection.  For pregnant patients, it is covered up to 3 times per pregnancy. Immunizations:  Immunization Recommendations   Influenza Vaccine Annual influenza vaccination during flu season is recommended for all persons aged >= 6 months who do not have contraindications   Pneumococcal Vaccine   * Pneumococcal conjugate vaccine = PCV13 (Prevnar 13), PCV15 (Vaxneuvance), PCV20 (Prevnar 20)  * Pneumococcal polysaccharide vaccine = PPSV23 (Pneumovax) Adults 44-73 yo with certain risk factors or if 69+ yo  If never received any pneumonia vaccine: recommend Prevnar 20 (PCV20)  Give PCV20 if previously received 1 dose of PCV13 or PPSV23   Hepatitis B Vaccine 3 dose series if at intermediate or high risk (ex: diabetes, end stage renal disease, liver disease)   Respiratory syncytial virus (RSV) Vaccine - COVERED BY MEDICARE PART D  * RSVPreF3 (Arexvy) CDC recommends that adults 61years of age and older may receive a single dose of RSV vaccine using shared clinical decision-making (SCDM)   Tetanus (Td) Vaccine - COST NOT COVERED BY MEDICARE PART B Following completion of primary series, a booster dose should be given every 10 years to maintain immunity against tetanus. Td may also be given as tetanus wound prophylaxis. Tdap Vaccine - COST NOT COVERED BY MEDICARE PART B Recommended at least once for all adults. For pregnant patients, recommended with each pregnancy. Shingles Vaccine (Shingrix) - COST NOT COVERED BY MEDICARE PART B  2 shot series recommended in those 19 years and older who have or will have weakened immune systems or those 50 years and older     Health Maintenance Due:  There are no preventive care reminders to display for this patient. Immunizations Due:      Topic Date Due   • COVID-19 Vaccine (3 - Moderna series) 04/19/2021   • Influenza Vaccine (1) 09/01/2023     Advance Directives   What are advance directives? Advance directives are legal documents that state your wishes and plans for medical care.  These plans are made ahead of time in case you lose your ability to make decisions for yourself. Advance directives can apply to any medical decision, such as the treatments you want, and if you want to donate organs. What are the types of advance directives? There are many types of advance directives, and each state has rules about how to use them. You may choose a combination of any of the following:  Living will: This is a written record of the treatment you want. You can also choose which treatments you do not want, which to limit, and which to stop at a certain time. This includes surgery, medicine, IV fluid, and tube feedings. Durable power of  for [facility]): This is a written record that states who you want to make healthcare choices for you when you are unable to make them for yourself. This person, called a proxy, is usually a family member or a friend. You may choose more than 1 proxy. Do not resuscitate (DNR) order:  A DNR order is used in case your heart stops beating or you stop breathing. It is a request not to have certain forms of treatment, such as CPR. A DNR order may be included in other types of advance directives. Medical directive: This covers the care that you want if you are in a coma, near death, or unable to make decisions for yourself. You can list the treatments you want for each condition. Treatment may include pain medicine, surgery, blood transfusions, dialysis, IV or tube feedings, and a ventilator (breathing machine). Values history: This document has questions about your views, beliefs, and how you feel and think about life. This information can help others choose the care that you would choose. Why are advance directives important? An advance directive helps you control your care. Although spoken wishes may be used, it is better to have your wishes written down. Spoken wishes can be misunderstood, or not followed. Treatments may be given even if you do not want them.  An advance directive may make it easier for your family to make difficult choices about your care. Urinary Incontinence   Urinary incontinence (UI)  is when you lose control of your bladder. UI develops because your bladder cannot store or empty urine properly. The 3 most common types of UI are stress incontinence, urge incontinence, or both. Medicines:   May be given to help strengthen your bladder control. Report any side effects of medication to your healthcare provider. Do pelvic muscle exercises often:  Your pelvic muscles help you stop urinating. Squeeze these muscles tight for 5 seconds, then relax for 5 seconds. Gradually work up to squeezing for 10 seconds. Do 3 sets of 15 repetitions a day, or as directed. This will help strengthen your pelvic muscles and improve bladder control. Train your bladder:  Go to the bathroom at set times, such as every 2 hours, even if you do not feel the urge to go. You can also try to hold your urine when you feel the urge to go. For example, hold your urine for 5 minutes when you feel the urge to go. As that becomes easier, hold your urine for 10 minutes. Self-care:   Keep a UI record. Write down how often you leak urine and how much you leak. Make a note of what you were doing when you leaked urine. Drink liquids as directed. You may need to limit the amount of liquid you drink to help control your urine leakage. Do not drink any liquid right before you go to bed. Limit or do not have drinks that contain caffeine or alcohol. Prevent constipation. Eat a variety of high-fiber foods. Good examples are high-fiber cereals, beans, vegetables, and whole-grain breads. Walking is the best way to trigger your intestines to have a bowel movement. Exercise regularly and maintain a healthy weight. Weight loss and exercise will decrease pressure on your bladder and help you control your leakage. Use a catheter as directed  to help empty your bladder.  A catheter is a tiny, plastic tube that is put into your bladder to drain your urine. Go to behavior therapy as directed. Behavior therapy may be used to help you learn to control your urge to urinate. Weight Management   Why it is important to manage your weight:  Being overweight increases your risk of health conditions such as heart disease, high blood pressure, type 2 diabetes, and certain types of cancer. It can also increase your risk for osteoarthritis, sleep apnea, and other respiratory problems. Aim for a slow, steady weight loss. Even a small amount of weight loss can lower your risk of health problems. How to lose weight safely:  A safe and healthy way to lose weight is to eat fewer calories and get regular exercise. You can lose up about 1 pound a week by decreasing the number of calories you eat by 500 calories each day. Healthy meal plan for weight management:  A healthy meal plan includes a variety of foods, contains fewer calories, and helps you stay healthy. A healthy meal plan includes the following:  Eat whole-grain foods more often. A healthy meal plan should contain fiber. Fiber is the part of grains, fruits, and vegetables that is not broken down by your body. Whole-grain foods are healthy and provide extra fiber in your diet. Some examples of whole-grain foods are whole-wheat breads and pastas, oatmeal, brown rice, and bulgur. Eat a variety of vegetables every day. Include dark, leafy greens such as spinach, kale, elana greens, and mustard greens. Eat yellow and orange vegetables such as carrots, sweet potatoes, and winter squash. Eat a variety of fruits every day. Choose fresh or canned fruit (canned in its own juice or light syrup) instead of juice. Fruit juice has very little or no fiber. Eat low-fat dairy foods. Drink fat-free (skim) milk or 1% milk. Eat fat-free yogurt and low-fat cottage cheese. Try low-fat cheeses such as mozzarella and other reduced-fat cheeses.   Choose meat and other protein foods that are low in fat.  Choose beans or other legumes such as split peas or lentils. Choose fish, skinless poultry (chicken or turkey), or lean cuts of red meat (beef or pork). Before you cook meat or poultry, cut off any visible fat. Use less fat and oil. Try baking foods instead of frying them. Add less fat, such as margarine, sour cream, regular salad dressing and mayonnaise to foods. Eat fewer high-fat foods. Some examples of high-fat foods include french fries, doughnuts, ice cream, and cakes. Eat fewer sweets. Limit foods and drinks that are high in sugar. This includes candy, cookies, regular soda, and sweetened drinks. Exercise:  Exercise at least 30 minutes per day on most days of the week. Some examples of exercise include walking, biking, dancing, and swimming. You can also fit in more physical activity by taking the stairs instead of the elevator or parking farther away from stores. Ask your healthcare provider about the best exercise plan for you. © Copyright [employer] 2018 Information is for End User's use only and may not be sold, redistributed or otherwise used for commercial purposes.  All illustrations and images included in CareNotes® are the copyrighted property of [employer] or  [employer]

## 2023-11-06 NOTE — PROGRESS NOTES
Assessment and Plan:     Problem List Items Addressed This Visit          Endocrine    Acquired hypothyroidism       Cardiovascular and Mediastinum    Essential hypertension       Genitourinary    Stage 3a chronic kidney disease ([address])    Relevant Orders    Ambulatory Referral to Nephrology       Other    Overweight (BMI 25.0-29. 9)    Medicare annual wellness visit, subsequent - Primary     Other Visit Diagnoses       Encounter for immunization              BMI Counseling: Body mass index is 27.92 kg/m². The BMI is above normal. Exercise recommendations include exercising 3-5 times per week. Depression Screening and Follow-up Plan: Patient was screened for depression during today's encounter. They screened negative with a PHQ-2 score of 0. Preventive health issues were discussed with patient, and age appropriate screening tests were ordered as noted in patient's After Visit Summary. Personalized health advice and appropriate referrals for health education or preventive services given if needed, as noted in patient's After Visit Summary. History of Present Illness:     Patient presents for a Medicare Wellness Visit    Hypertension  This is a chronic problem. The current episode started more than 1 year ago. The problem is unchanged. Pertinent negatives include no chest pain, palpitations or shortness of breath. Patient Care Team:  Gonzalo Luna DO as PCP - General  MD Gonzalo Herndon DO Rollin Regan, MD     Review of Systems:     Review of Systems   Constitutional:  Negative for chills, fatigue and fever. HENT: Negative. Negative for hearing loss. Eyes: Negative. Negative for visual disturbance. Respiratory:  Negative for shortness of breath and wheezing. Cardiovascular:  Negative for chest pain and palpitations. Gastrointestinal:  Negative for abdominal pain, blood in stool, constipation, diarrhea, nausea and vomiting. Endocrine: Negative. Genitourinary:  Negative for difficulty urinating and dysuria. Musculoskeletal:  Negative for arthralgias and myalgias. Skin: Negative. Allergic/Immunologic: Negative. Neurological:  Negative for seizures and syncope. Hematological:  Negative for adenopathy. Psychiatric/Behavioral: Negative. Problem List:     Patient Active Problem List   Diagnosis    Coronary artery disease involving native coronary artery of native heart without angina pectoris    Hypercholesterolemia    Negative depression screening    History of radiofrequency ablation (RFA) procedure for cardiac arrhythmia    Overweight (BMI 25.0-29. 9)    Medicare annual wellness visit, subsequent    Acquired hypothyroidism    Age related osteoporosis    Hyponatremia    Essential hypertension    Dyspnea on exertion    Stage 3a chronic kidney disease ([address])      Past Medical and Surgical History:     Past Medical History:   Diagnosis Date    Cardiac arrhythmia     Coronary artery disease     Disease of thyroid gland     Esophageal reflux     GERD (gastroesophageal reflux disease)     Hypertension     Hypothyroidism     Shingles 11-    Supraventricular tachycardia 7/27/2016    Varicella-zoster infection      Past Surgical History:   Procedure Laterality Date    CHOLECYSTECTOMY      COLON SURGERY      CORONARY ANGIOPLASTY      HYSTERECTOMY  1968    PARTIAL HYSTERECTOMY      SMALL INTESTINE SURGERY      Partial    TONSILLECTOMY  age 23      Family History:     Family History   Problem Relation Age of Onset    Heart disease Mother     Heart disease Father     COPD Father       Social History:     Social History     Socioeconomic History    Marital status:       Spouse name: None    Number of children: 3    Years of education: None    Highest education level: None   Occupational History    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   Tobacco Use    Smoking status: Never    Smokeless tobacco: Never   Vaping Use    Vaping Use: Never used   Substance and Sexual Activity    Alcohol use: No    Drug use: No    Sexual activity: Not Currently   Other Topics Concern    None   Social History Narrative    No caffeine use     Social Determinants of Health     Financial Resource Strain: Low Risk  (11/1/2023)    Overall Financial Resource Strain (CARDIA)     Difficulty of Paying Living Expenses: Not hard at all   Food Insecurity: Not on file   Transportation Needs: No Transportation Needs (11/1/2023)    PRAPARE - Transportation     Lack of Transportation (Medical): No     Lack of Transportation (Non-Medical): No   Physical Activity: Not on file   Stress: Not on file   Social Connections: Not on file   Intimate Partner Violence: Not on file   Housing Stability: Not on file      Medications and Allergies:     Current Outpatient Medications   Medication Sig Dispense Refill    chlorhexidine (PERIDEX) 0.12 % solution       cholecalciferol (VITAMIN D3) 1,000 units tablet Take 4,000 Units by mouth daily      Coenzyme Q10 (CO Q10) 100 MG CAPS Take 1 capsule by mouth daily. levothyroxine 75 mcg tablet Take 1 tablet by mouth once daily 90 tablet 0    metoprolol succinate (TOPROL-XL) 25 mg 24 hr tablet Take 1/2 (one-half) tablet by mouth once daily 45 tablet 0    Multiple Vitamins-Minerals (RA VISION-ROCK PRESERVE PO) Take 1 tablet by mouth 2 (two) times a day       omeprazole (PriLOSEC) 40 MG capsule Take 40 mg by mouth daily      PREVIDENT 5000 PLUS 1.1 % CREA daily  0    aspirin 81 MG tablet Take 81 mg by mouth every other day       Omega-3 Fatty Acids (FISH OIL PO) Take 1 capsule by mouth daily (Patient not taking: Reported on 11/6/2023)       No current facility-administered medications for this visit.      Allergies   Allergen Reactions    Bextra [Valdecoxib]     Codeine Nausea Only, Headache and Tremor    Demerol [Meperidine] Nausea Only and Headache    Diovan [Valsartan]     Enalapril Cough    Lisinopril Cough    Penicillins     Percocet [Oxycodone-Acetaminophen]     Sulfa Antibiotics Abdominal Pain    Tramadol Nausea Only and Headache    Vicodin [Hydrocodone-Acetaminophen]     Zithromax [Azithromycin] Diarrhea    Zocor [Simvastatin] Hives      Immunizations:     Immunization History   Administered Date(s) Administered    COVID-19 MODERNA VACC 0.5 ML IM 01/25/2021, 02/22/2021    DT (pediatric) 06/07/2001    INFLUENZA 12/12/2017    Influenza, high dose seasonal 0.7 mL 10/17/2022    Influenza, seasonal, injectable 10/20/2010    Pneumococcal Conjugate 13-Valent 02/27/2018    Pneumococcal Polysaccharide PPV23 02/09/2007    Tdap 09/09/2015      Health Maintenance: There are no preventive care reminders to display for this patient. Topic Date Due    COVID-19 Vaccine (3 - Moderna series) 04/19/2021    Influenza Vaccine (1) 09/01/2023      Medicare Screening Tests and Risk Assessments:     Jose Joseph is here for her Subsequent Wellness visit. Last Medicare Wellness visit information reviewed, patient interviewed and updates made to the record today. Health Risk Assessment:   Patient rates overall health as good. Patient feels that their physical health rating is same. Patient is satisfied with their life. Eyesight was rated as same. Hearing was rated as same. Patient feels that their emotional and mental health rating is same. Patients states they are never, rarely angry. Patient states they are never, rarely unusually tired/fatigued. Pain experienced in the last 7 days has been none. Patient states that she has experienced no weight loss or gain in last 6 months. Depression Screening:   PHQ-2 Score: 0      Fall Risk Screening: In the past year, patient has experienced: no history of falling in past year      Urinary Incontinence Screening:   Patient has leaked urine accidently in the last six months. Home Safety:  Patient does not have trouble with stairs inside or outside of their home.  Patient has working smoke alarms and has working carbon monoxide detector. Home safety hazards include: none. Nutrition:   Current diet is Regular. Medications:   Patient is currently taking over-the-counter supplements. OTC medications include: see medication list. Patient is able to manage medications. Activities of Daily Living (ADLs)/Instrumental Activities of Daily Living (IADLs):   Walk and transfer into and out of bed and chair?: Yes  Dress and groom yourself?: Yes    Bathe or shower yourself?: Yes    Feed yourself? Yes  Do your laundry/housekeeping?: Yes  Manage your money, pay your bills and track your expenses?: Yes  Make your own meals?: Yes    Do your own shopping?: Yes    Previous Hospitalizations:   Any hospitalizations or ED visits within the last 12 months?: No      Advance Care Planning:   Living will: Yes    Durable POA for healthcare:  Yes    Advanced directive: Yes    Advanced directive counseling given: No    Five wishes given: No    Patient declined ACP directive: No    End of Life Decisions reviewed with patient: Yes    Provider agrees with end of life decisions: Yes      Cognitive Screening:   Provider or family/friend/caregiver concerned regarding cognition?: No    PREVENTIVE SCREENINGS      Cardiovascular Screening:    General: Screening Not Indicated and History Lipid Disorder      Diabetes Screening:     General: Screening Current      Colorectal Cancer Screening:     General: Screening Not Indicated      Breast Cancer Screening:     General: Screening Not Indicated      Cervical Cancer Screening:    General: Screening Not Indicated      Osteoporosis Screening:    General: Screening Not Indicated and History Osteoporosis      Abdominal Aortic Aneurysm (AAA) Screening:        General: Screening Not Indicated      Lung Cancer Screening:     General: Screening Not Indicated      Hepatitis C Screening:    General: Screening Not Indicated    Screening, Brief Intervention, and Referral to Treatment (SBIRT)    Screening      Single Item Drug Screening:  How often have you used an illegal drug (including marijuana) or a prescription medication for non-medical reasons in the past year? never    Single Item Drug Screen Score: 0  Interpretation: Negative screen for possible drug use disorder    No results found. Physical Exam:     /70   Pulse 71   Temp (!) 96.9 °F (36.1 °C)   Ht 4' 11" (1.499 m)   Wt 62.7 kg (138 lb 4 oz)   SpO2 97%   BMI 27.92 kg/m²     Physical Exam  Vitals and nursing note reviewed. Constitutional:       General: She is not in acute distress. Appearance: Normal appearance. She is well-developed. She is not ill-appearing, toxic-appearing or diaphoretic. HENT:      Head: Normocephalic and atraumatic. Right Ear: Tympanic membrane, ear canal and external ear normal. There is no impacted cerumen. Left Ear: Tympanic membrane, ear canal and external ear normal. There is no impacted cerumen. Nose: Nose normal. No congestion or rhinorrhea. Mouth/Throat:      Mouth: Mucous membranes are moist.      Pharynx: Oropharynx is clear. No oropharyngeal exudate or posterior oropharyngeal erythema. Eyes:      General:         Right eye: No discharge. Left eye: No discharge. Conjunctiva/sclera: Conjunctivae normal.      Pupils: Pupils are equal, round, and reactive to light. Cardiovascular:      Rate and Rhythm: Normal rate and regular rhythm. Pulses: Normal pulses. Heart sounds: Normal heart sounds. No murmur heard. Pulmonary:      Effort: Pulmonary effort is normal. No respiratory distress. Breath sounds: Normal breath sounds. No wheezing, rhonchi or rales. Abdominal:      General: Abdomen is flat. There is no distension. Palpations: Abdomen is soft. There is no mass. Tenderness: There is no abdominal tenderness. There is no guarding. Hernia: No hernia is present. Musculoskeletal:         General: No deformity. Normal range of motion.       Cervical back: Normal range of motion and neck supple. No rigidity. Right lower leg: No edema. Left lower leg: No edema. Lymphadenopathy:      Cervical: No cervical adenopathy. Skin:     General: Skin is warm and dry. Findings: No rash. Neurological:      General: No focal deficit present. Mental Status: She is alert and oriented to person, place, and time. Sensory: No sensory deficit. Motor: No weakness. Coordination: Coordination normal.      Gait: Gait normal.   Psychiatric:         Mood and Affect: Mood normal.         Behavior: Behavior normal.         Thought Content: Thought content normal.         Judgment: Judgment normal.        BMI Counseling: Body mass index is 27.92 kg/m². The BMI is above normal. Nutrition recommendations include reducing portion sizes and 3-5 servings of fruits/vegetables daily. Exercise recommendations include moderate aerobic physical activity for 150 minutes/week and exercising 3-5 times per week.    Danette Bhatt DO

## 2023-11-08 DIAGNOSIS — I48.91 ATRIAL FIBRILLATION, UNSPECIFIED TYPE (HCC): ICD-10-CM

## 2023-11-08 RX ORDER — METOPROLOL SUCCINATE 25 MG/1
TABLET, EXTENDED RELEASE ORAL
Qty: 45 TABLET | Refills: 3 | Status: SHIPPED | OUTPATIENT
Start: 2023-11-08

## 2023-11-09 ENCOUNTER — CONSULT (OUTPATIENT)
Dept: NEPHROLOGY | Facility: CLINIC | Age: 88
End: 2023-11-09

## 2023-11-09 VITALS
BODY MASS INDEX: 27.82 KG/M2 | HEART RATE: 75 BPM | WEIGHT: 138 LBS | DIASTOLIC BLOOD PRESSURE: 80 MMHG | OXYGEN SATURATION: 95 % | SYSTOLIC BLOOD PRESSURE: 140 MMHG | HEIGHT: 59 IN

## 2023-11-09 DIAGNOSIS — N18.31 STAGE 3A CHRONIC KIDNEY DISEASE (HCC): Primary | ICD-10-CM

## 2023-11-09 DIAGNOSIS — I10 ESSENTIAL HYPERTENSION: ICD-10-CM

## 2023-11-09 DIAGNOSIS — E87.1 HYPONATREMIA: ICD-10-CM

## 2023-11-09 RX ORDER — UREA 10 %
500 LOTION (ML) TOPICAL DAILY
COMMUNITY

## 2023-11-09 RX ORDER — FAMOTIDINE 10 MG
10 TABLET ORAL 2 TIMES DAILY
COMMUNITY

## 2023-11-09 NOTE — ASSESSMENT & PLAN NOTE
Blood pressures well controlled at this time, patient is on a low-dose of metoprolol. Patient was asked to avoid high sodium foods and try to be on a lower sodium diet.

## 2023-11-09 NOTE — ASSESSMENT & PLAN NOTE
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 We will check labs including cortisol level and a urine osmolality. Patient previously has regular thyroid level checked, she is on levothyroxine 75 mcg daily. She was asked to reduce fluid intake, which she has purposely increased. No official volume of fluid was requested, simply that she reduce and see how we do with her serum sodium level. Additional testing may be indicated going forward, would continue with conservative care and treatment at this time.

## 2023-11-09 NOTE — ASSESSMENT & PLAN NOTE
Lab Results   Component Value Date    EGFR 40 10/26/2023    EGFR 51 09/26/2022    EGFR 57 10/05/2021    CREATININE 1.19 10/26/2023    CREATININE 0.98 09/26/2022    CREATININE 0.91 10/05/2021     Patient's baseline creatinine likely around 0.9-1 mg/dL. Increasing creatinine most likely due to use of turmeric which the patient will discontinue. She is aware that she can take multivitamin without additional herbal supplements. Continue to optimize care in general.  We will repeat labs in about 1 week to confirm that she is back to her typical baseline creatinine.

## 2023-11-09 NOTE — PROGRESS NOTES
[facility] [address] [place] [address]    Name: Katya Ayala  YOB: 1933      Assessment/Plan:    Stage 3a chronic kidney disease [facility])  Lab Results   Component Value Date    EGFR 40 10/26/2023    EGFR 51 09/26/2022    EGFR 57 10/05/2021    CREATININE 1.19 10/26/2023    CREATININE 0.98 09/26/2022    CREATININE 0.91 10/05/2021     Patient's baseline creatinine likely around 0.9-1 mg/dL. Increasing creatinine most likely due to use of turmeric which the patient will discontinue. She is aware that she can take multivitamin without additional herbal supplements. Continue to optimize care in general.  We will repeat labs in about 1 week to confirm that she is back to her typical baseline creatinine. 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 We will check labs including cortisol level and a urine osmolality. Patient previously has regular thyroid level checked, she is on levothyroxine 75 mcg daily. She was asked to reduce fluid intake, which she has purposely increased. No official volume of fluid was requested, simply that she reduce and see how we do with her serum sodium level. Additional testing may be indicated going forward, would continue with conservative care and treatment at this time. Essential hypertension  Blood pressures well controlled at this time, patient is on a low-dose of metoprolol. Patient was asked to avoid high sodium foods and try to be on a lower sodium diet. Problem List Items Addressed This Visit          Cardiovascular and Mediastinum    Essential hypertension     Blood pressures well controlled at this time, patient is on a low-dose of metoprolol. Patient was asked to avoid high sodium foods and try to be on a lower sodium diet.             Genitourinary    Stage 3a chronic kidney disease [facility]) - Primary     Lab Results   Component Value Date    EGFR 40 10/26/2023    EGFR 51 09/26/2022    EGFR 57 10/05/2021    CREATININE 1.19 10/26/2023    CREATININE 0.98 09/26/2022    CREATININE 0.91 10/05/2021   Patient's baseline creatinine likely around 0.9-1 mg/dL. Increasing creatinine most likely due to use of turmeric which the patient will discontinue. She is aware that she can take multivitamin without additional herbal supplements. Continue to optimize care in general.  We will repeat labs in about 1 week to confirm that she is back to her typical baseline creatinine. 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 We will check labs including cortisol level and a urine osmolality. Patient previously has regular thyroid level checked, she is on levothyroxine 75 mcg daily. She was asked to reduce fluid intake, which she has purposely increased. No official volume of fluid was requested, simply that she reduce and see how we do with her serum sodium level. Additional testing may be indicated going forward, would continue with conservative care and treatment at this time. Relevant Orders    Osmolality, urine    Cortisol Level,7-9 AM Specimen       Thank you for allowing us to participate in the care of your patient. Labs will be checked as noted above, additional testing may be indicated depending on she progresses. We will also check a kidney ultrasound to confirm that she has 2 normal kidneys from the anatomic standpoint. Of note the patient has had previous abdominal surgeries and injuries which may or may not have impacted her kidneys. Subjective:      Patient ID: Gurwinder Mccoy is a 80 y.o. female. Patient presents for initial evaluation.     Reviewed the patient's labs in detail, typically, patient has an estimated GFR between 50-55 mL/min, but more recently estimated GFR declined to 40 mL/minute. After much discussion, patient presented me with one of her supplements which she takes twice a day, this supplement has turmeric in it. Otherwise she denies nausea vomiting or diarrhea, she claims that she has been gaining weight recently not losing weight. She denies other uses of nonsteroidal anti-inflammatory medications. Hypertension  This is a chronic problem. The current episode started more than 1 year ago. The problem is unchanged. The problem is controlled. Pertinent negatives include no chest pain, orthopnea or peripheral edema. There are no associated agents to hypertension. Risk factors for coronary artery disease include post-menopausal state and sedentary lifestyle. Past treatments include lifestyle changes. There are no compliance problems. Hypertensive end-organ damage includes kidney disease. Identifiable causes of hypertension include chronic renal disease. The following portions of the patient's history were reviewed and updated as appropriate: allergies, current medications, past family history, past medical history, past social history, past surgical history and problem list.    Review of Systems   Cardiovascular:  Negative for chest pain and orthopnea. All other systems reviewed and are negative. 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   Tobacco Use    Smoking status: Never    Smokeless tobacco: Never   Vaping Use    Vaping Use: Never used   Substance and Sexual Activity    Alcohol use: No    Drug use: No    Sexual activity: Not Currently   Other Topics Concern    None   Social History Narrative    No caffeine use     Social Determinants of Health     Financial Resource Strain: Low Risk  (11/1/2023) Overall Financial Resource Strain (CARDIA)     Difficulty of Paying Living Expenses: Not hard at all   Food Insecurity: Not on file   Transportation Needs: No Transportation Needs (11/1/2023)    PRAPARE - Transportation     Lack of Transportation (Medical): No     Lack of Transportation (Non-Medical):  No   Physical Activity: Not on file   Stress: Not on file   Social Connections: Not on file   Intimate Partner Violence: Not on file   Housing Stability: Not on file     Past Medical History:   Diagnosis Date    Cardiac arrhythmia     Coronary artery disease     Disease of thyroid gland     Esophageal reflux     GERD (gastroesophageal reflux disease)     Hypertension     Hypothyroidism     Shingles 11-    Supraventricular tachycardia 7/27/2016    Varicella-zoster infection      Past Surgical History:   Procedure Laterality Date    CHOLECYSTECTOMY      COLON SURGERY      CORONARY ANGIOPLASTY      HYSTERECTOMY  1968    PARTIAL HYSTERECTOMY      SMALL INTESTINE SURGERY      Partial    TONSILLECTOMY  age 23       Current Outpatient Medications:     aspirin 81 MG tablet, Take 81 mg by mouth every other day , Disp: , Rfl:     chlorhexidine (PERIDEX) 0.12 % solution, , Disp: , Rfl:     famotidine (PEPCID) 10 mg tablet, Take 10 mg by mouth 2 (two) times a day, Disp: , Rfl:     levothyroxine 75 mcg tablet, Take 1 tablet by mouth once daily, Disp: 90 tablet, Rfl: 0    magnesium gluconate (MAGONATE) 500 mg tablet, Take 500 mg by mouth in the morning, Disp: , Rfl:     metoprolol succinate (TOPROL-XL) 25 mg 24 hr tablet, Take 1/2 (one-half) tablet by mouth once daily, Disp: 45 tablet, Rfl: 3    Multiple Vitamins-Minerals (RA VISION-ROCK PRESERVE PO), Take 1 tablet by mouth 2 (two) times a day , Disp: , Rfl:     Omega-3 Fatty Acids (FISH OIL PO), Take 1 capsule by mouth daily, Disp: , Rfl:     PREVIDENT 5000 PLUS 1.1 % CREA, daily, Disp: , Rfl: 0    Lab Results   Component Value Date    SODIUM 134 (L) 10/26/2023    K 4.5 10/26/2023    CL 99 10/26/2023    CO2 25 10/26/2023    AGAP 10 10/26/2023    BUN 23 10/26/2023    CREATININE 1.19 10/26/2023    GLUC 97 01/13/2020    GLUF 95 10/26/2023    CALCIUM 9.4 10/26/2023    AST 26 10/26/2023    ALT 19 10/26/2023    ALKPHOS 88 10/26/2023    TP 7.8 10/26/2023    TBILI 0.84 10/26/2023    EGFR 40 10/26/2023     Lab Results   Component Value Date    WBC 5.06 10/26/2023    HGB 14.7 10/26/2023    HCT 43.5 10/26/2023    MCV 95 10/26/2023     10/26/2023     Lab Results   Component Value Date    CHOLESTEROL 196 10/26/2023    CHOLESTEROL 183 09/26/2022    CHOLESTEROL 199 10/05/2021     Lab Results   Component Value Date    HDL 74 10/26/2023    HDL 72 09/26/2022    HDL 75 10/05/2021     Lab Results   Component Value Date    LDLCALC 109 (H) 10/26/2023    LDLCALC 101 (H) 09/26/2022    LDLCALC 112 (H) 10/05/2021     Lab Results   Component Value Date    TRIG 63 10/26/2023    TRIG 52 09/26/2022    TRIG 59 10/05/2021     No results found for: "CHOLHDL"  Lab Results   Component Value Date    [id number] 4.357 10/26/2023     Lab Results   Component Value Date    CALCIUM 9.4 10/26/2023     No results found for: "SPEP", "UPEP"  No results found for: "Blanchie Villa", "XLTT92PLE"        Objective:      /80 (BP Location: Left arm, Patient Position: Sitting, Cuff Size: Standard)   Pulse 75   Ht 4' 11" (1.499 m)   Wt 62.6 kg (138 lb)   SpO2 95%   BMI 27.87 kg/m²          Physical Exam  Vitals reviewed. Constitutional:       General: She is not in acute distress. Appearance: She is well-developed. HENT:      Head: Normocephalic and atraumatic. Eyes:      Conjunctiva/sclera: Conjunctivae normal.   Cardiovascular:      Rate and Rhythm: Normal rate and regular rhythm. Pulmonary:      Effort: Pulmonary effort is normal.      Breath sounds: Normal breath sounds. Abdominal:      Palpations: Abdomen is soft. Musculoskeletal:      Cervical back: Neck supple. Skin:     General: Skin is warm. Findings: No rash. Neurological:      Mental Status: She is alert and oriented to person, place, and time. Cranial Nerves: No cranial nerve deficit.    Psychiatric:         Behavior: Behavior normal.

## 2023-11-15 ENCOUNTER — APPOINTMENT (OUTPATIENT)
Dept: LAB | Facility: CLINIC | Age: 88
End: 2023-11-15
Payer: COMMERCIAL

## 2023-11-15 DIAGNOSIS — E87.1 HYPONATREMIA: ICD-10-CM

## 2023-11-15 DIAGNOSIS — N18.31 STAGE 3A CHRONIC KIDNEY DISEASE (HCC): ICD-10-CM

## 2023-11-15 LAB
25(OH)D3 SERPL-MCNC: 49.7 NG/ML (ref 30–100)
ALBUMIN SERPL BCP-MCNC: 4.2 G/DL (ref 3.5–5)
ALP SERPL-CCNC: 84 U/L (ref 34–104)
ALT SERPL W P-5'-P-CCNC: 20 U/L (ref 7–52)
ANION GAP SERPL CALCULATED.3IONS-SCNC: 11 MMOL/L
AST SERPL W P-5'-P-CCNC: 24 U/L (ref 13–39)
BACTERIA UR QL AUTO: ABNORMAL /HPF
BILIRUB SERPL-MCNC: 0.93 MG/DL (ref 0.2–1)
BILIRUB UR QL STRIP: NEGATIVE
BUN SERPL-MCNC: 18 MG/DL (ref 5–25)
CALCIUM SERPL-MCNC: 9.5 MG/DL (ref 8.4–10.2)
CHLORIDE SERPL-SCNC: 99 MMOL/L (ref 96–108)
CLARITY UR: ABNORMAL
CO2 SERPL-SCNC: 25 MMOL/L (ref 21–32)
COLOR UR: YELLOW
CORTIS AM PEAK SERPL-MCNC: 14.4 UG/DL (ref 6.7–22.6)
CREAT SERPL-MCNC: 1.08 MG/DL (ref 0.6–1.3)
CREAT UR-MCNC: 124 MG/DL
GFR SERPL CREATININE-BSD FRML MDRD: 45 ML/MIN/1.73SQ M
GLUCOSE P FAST SERPL-MCNC: 94 MG/DL (ref 65–99)
GLUCOSE UR STRIP-MCNC: NEGATIVE MG/DL
HGB UR QL STRIP.AUTO: ABNORMAL
HYALINE CASTS #/AREA URNS LPF: ABNORMAL /LPF
KETONES UR STRIP-MCNC: NEGATIVE MG/DL
LEUKOCYTE ESTERASE UR QL STRIP: ABNORMAL
MAGNESIUM SERPL-MCNC: 2.2 MG/DL (ref 1.9–2.7)
MICROALBUMIN UR-MCNC: 42.6 MG/L
MICROALBUMIN/CREAT 24H UR: 34 MG/G CREATININE (ref 0–30)
MUCOUS THREADS UR QL AUTO: ABNORMAL
NITRITE UR QL STRIP: POSITIVE
NON-SQ EPI CELLS URNS QL MICRO: ABNORMAL /HPF
PH UR STRIP.AUTO: 6 [PH]
PHOSPHATE SERPL-MCNC: 3.5 MG/DL (ref 2.3–4.1)
POTASSIUM SERPL-SCNC: 4.6 MMOL/L (ref 3.5–5.3)
PROT SERPL-MCNC: 7.6 G/DL (ref 6.4–8.4)
PROT UR STRIP-MCNC: ABNORMAL MG/DL
PTH-INTACT SERPL-MCNC: 56 PG/ML (ref 12–88)
RBC #/AREA URNS AUTO: ABNORMAL /HPF
SODIUM SERPL-SCNC: 135 MMOL/L (ref 135–147)
SP GR UR STRIP.AUTO: 1.01 (ref 1–1.03)
UROBILINOGEN UR STRIP-ACNC: <2 MG/DL
WBC #/AREA URNS AUTO: ABNORMAL /HPF
WBC CLUMPS # UR AUTO: PRESENT /UL

## 2023-11-15 PROCEDURE — 80053 COMPREHEN METABOLIC PANEL: CPT

## 2023-11-15 PROCEDURE — 82043 UR ALBUMIN QUANTITATIVE: CPT

## 2023-11-15 PROCEDURE — 82570 ASSAY OF URINE CREATININE: CPT

## 2023-11-15 PROCEDURE — 81001 URINALYSIS AUTO W/SCOPE: CPT

## 2023-11-15 PROCEDURE — 82306 VITAMIN D 25 HYDROXY: CPT

## 2023-11-15 PROCEDURE — 83735 ASSAY OF MAGNESIUM: CPT

## 2023-11-15 PROCEDURE — 82533 TOTAL CORTISOL: CPT

## 2023-11-15 PROCEDURE — 84100 ASSAY OF PHOSPHORUS: CPT

## 2023-11-15 PROCEDURE — 36415 COLL VENOUS BLD VENIPUNCTURE: CPT

## 2023-11-15 PROCEDURE — 83970 ASSAY OF PARATHORMONE: CPT

## 2023-11-16 ENCOUNTER — APPOINTMENT (OUTPATIENT)
Dept: LAB | Facility: CLINIC | Age: 88
End: 2023-11-16
Payer: COMMERCIAL

## 2023-11-16 ENCOUNTER — HOSPITAL ENCOUNTER (OUTPATIENT)
Dept: ULTRASOUND IMAGING | Facility: HOSPITAL | Age: 88
End: 2023-11-16
Attending: INTERNAL MEDICINE
Payer: COMMERCIAL

## 2023-11-16 DIAGNOSIS — N18.31 STAGE 3A CHRONIC KIDNEY DISEASE (HCC): ICD-10-CM

## 2023-11-16 LAB — OSMOLALITY UR: 282 MMOL/KG

## 2023-11-16 PROCEDURE — 76775 US EXAM ABDO BACK WALL LIM: CPT

## 2023-11-16 PROCEDURE — 83935 ASSAY OF URINE OSMOLALITY: CPT

## 2023-11-17 DIAGNOSIS — N18.31 STAGE 3A CHRONIC KIDNEY DISEASE (HCC): Primary | ICD-10-CM

## 2024-02-01 DIAGNOSIS — E03.9 ACQUIRED HYPOTHYROIDISM: ICD-10-CM

## 2024-02-05 RX ORDER — LEVOTHYROXINE SODIUM 0.07 MG/1
75 TABLET ORAL DAILY
Qty: 90 TABLET | Refills: 0 | Status: SHIPPED | OUTPATIENT
Start: 2024-02-05

## 2024-02-21 PROBLEM — Z00.00 MEDICARE ANNUAL WELLNESS VISIT, SUBSEQUENT: Status: RESOLVED | Noted: 2020-09-14 | Resolved: 2024-02-21

## 2024-03-12 ENCOUNTER — TELEPHONE (OUTPATIENT)
Dept: NEPHROLOGY | Facility: CLINIC | Age: 89
End: 2024-03-12

## 2024-03-12 NOTE — TELEPHONE ENCOUNTER
Called and spoke to patient's daughter.  She will make sure she has lab/urine studies done for upcoming appt.

## 2024-03-13 ENCOUNTER — APPOINTMENT (OUTPATIENT)
Dept: LAB | Facility: CLINIC | Age: 89
End: 2024-03-13
Payer: COMMERCIAL

## 2024-03-13 DIAGNOSIS — N18.31 STAGE 3A CHRONIC KIDNEY DISEASE (HCC): ICD-10-CM

## 2024-03-13 LAB
ALBUMIN SERPL BCP-MCNC: 4.1 G/DL (ref 3.5–5)
ALP SERPL-CCNC: 87 U/L (ref 34–104)
ALT SERPL W P-5'-P-CCNC: 13 U/L (ref 7–52)
ANION GAP SERPL CALCULATED.3IONS-SCNC: 10 MMOL/L (ref 4–13)
AST SERPL W P-5'-P-CCNC: 21 U/L (ref 13–39)
BACTERIA UR QL AUTO: ABNORMAL /HPF
BASOPHILS # BLD AUTO: 0.07 THOUSANDS/ÂΜL (ref 0–0.1)
BASOPHILS NFR BLD AUTO: 1 % (ref 0–1)
BILIRUB SERPL-MCNC: 0.96 MG/DL (ref 0.2–1)
BILIRUB UR QL STRIP: NEGATIVE
BUN SERPL-MCNC: 29 MG/DL (ref 5–25)
CALCIUM SERPL-MCNC: 9 MG/DL (ref 8.4–10.2)
CHLORIDE SERPL-SCNC: 101 MMOL/L (ref 96–108)
CLARITY UR: CLEAR
CO2 SERPL-SCNC: 25 MMOL/L (ref 21–32)
COLOR UR: ABNORMAL
CREAT SERPL-MCNC: 1.26 MG/DL (ref 0.6–1.3)
CREAT UR-MCNC: 76.3 MG/DL
EOSINOPHIL # BLD AUTO: 0.18 THOUSAND/ÂΜL (ref 0–0.61)
EOSINOPHIL NFR BLD AUTO: 4 % (ref 0–6)
ERYTHROCYTE [DISTWIDTH] IN BLOOD BY AUTOMATED COUNT: 13.9 % (ref 11.6–15.1)
GFR SERPL CREATININE-BSD FRML MDRD: 37 ML/MIN/1.73SQ M
GLUCOSE P FAST SERPL-MCNC: 95 MG/DL (ref 65–99)
GLUCOSE UR STRIP-MCNC: NEGATIVE MG/DL
HCT VFR BLD AUTO: 42.9 % (ref 34.8–46.1)
HGB BLD-MCNC: 13.5 G/DL (ref 11.5–15.4)
HGB UR QL STRIP.AUTO: NEGATIVE
IMM GRANULOCYTES # BLD AUTO: 0 THOUSAND/UL (ref 0–0.2)
IMM GRANULOCYTES NFR BLD AUTO: 0 % (ref 0–2)
KETONES UR STRIP-MCNC: NEGATIVE MG/DL
LEUKOCYTE ESTERASE UR QL STRIP: ABNORMAL
LYMPHOCYTES # BLD AUTO: 2.46 THOUSANDS/ÂΜL (ref 0.6–4.47)
LYMPHOCYTES NFR BLD AUTO: 48 % (ref 14–44)
MAGNESIUM SERPL-MCNC: 2.1 MG/DL (ref 1.9–2.7)
MCH RBC QN AUTO: 30.6 PG (ref 26.8–34.3)
MCHC RBC AUTO-ENTMCNC: 31.5 G/DL (ref 31.4–37.4)
MCV RBC AUTO: 97 FL (ref 82–98)
MICROALBUMIN UR-MCNC: 14.3 MG/L
MICROALBUMIN/CREAT 24H UR: 19 MG/G CREATININE (ref 0–30)
MONOCYTES # BLD AUTO: 0.53 THOUSAND/ÂΜL (ref 0.17–1.22)
MONOCYTES NFR BLD AUTO: 10 % (ref 4–12)
NEUTROPHILS # BLD AUTO: 1.86 THOUSANDS/ÂΜL (ref 1.85–7.62)
NEUTS SEG NFR BLD AUTO: 37 % (ref 43–75)
NITRITE UR QL STRIP: POSITIVE
NON-SQ EPI CELLS URNS QL MICRO: ABNORMAL /HPF
NRBC BLD AUTO-RTO: 0 /100 WBCS
PH UR STRIP.AUTO: 6 [PH]
PLATELET # BLD AUTO: 333 THOUSANDS/UL (ref 149–390)
PMV BLD AUTO: 9.1 FL (ref 8.9–12.7)
POTASSIUM SERPL-SCNC: 4.2 MMOL/L (ref 3.5–5.3)
PROT SERPL-MCNC: 7.1 G/DL (ref 6.4–8.4)
PROT UR STRIP-MCNC: NEGATIVE MG/DL
PTH-INTACT SERPL-MCNC: 55.9 PG/ML (ref 12–88)
RBC # BLD AUTO: 4.41 MILLION/UL (ref 3.81–5.12)
RBC #/AREA URNS AUTO: ABNORMAL /HPF
SODIUM SERPL-SCNC: 136 MMOL/L (ref 135–147)
SP GR UR STRIP.AUTO: 1.01 (ref 1–1.03)
UROBILINOGEN UR STRIP-ACNC: <2 MG/DL
WBC # BLD AUTO: 5.1 THOUSAND/UL (ref 4.31–10.16)
WBC #/AREA URNS AUTO: ABNORMAL /HPF

## 2024-03-13 PROCEDURE — 83970 ASSAY OF PARATHORMONE: CPT

## 2024-03-13 PROCEDURE — 82043 UR ALBUMIN QUANTITATIVE: CPT

## 2024-03-13 PROCEDURE — 82570 ASSAY OF URINE CREATININE: CPT

## 2024-03-13 PROCEDURE — 81001 URINALYSIS AUTO W/SCOPE: CPT

## 2024-03-13 PROCEDURE — 83735 ASSAY OF MAGNESIUM: CPT

## 2024-03-13 PROCEDURE — 80053 COMPREHEN METABOLIC PANEL: CPT

## 2024-03-13 PROCEDURE — 85025 COMPLETE CBC W/AUTO DIFF WBC: CPT

## 2024-03-13 PROCEDURE — 36415 COLL VENOUS BLD VENIPUNCTURE: CPT

## 2024-03-18 ENCOUNTER — OFFICE VISIT (OUTPATIENT)
Dept: NEPHROLOGY | Facility: CLINIC | Age: 89
End: 2024-03-18
Payer: COMMERCIAL

## 2024-03-18 VITALS
BODY MASS INDEX: 26.41 KG/M2 | DIASTOLIC BLOOD PRESSURE: 80 MMHG | WEIGHT: 131 LBS | SYSTOLIC BLOOD PRESSURE: 128 MMHG | HEART RATE: 71 BPM | HEIGHT: 59 IN | OXYGEN SATURATION: 96 %

## 2024-03-18 DIAGNOSIS — N18.31 STAGE 3A CHRONIC KIDNEY DISEASE (HCC): Primary | ICD-10-CM

## 2024-03-18 DIAGNOSIS — IMO0002 INCREASE IN CREATININE: ICD-10-CM

## 2024-03-18 DIAGNOSIS — E87.1 HYPONATREMIA: ICD-10-CM

## 2024-03-18 DIAGNOSIS — I10 ESSENTIAL HYPERTENSION: ICD-10-CM

## 2024-03-18 PROBLEM — R79.89 INCREASE IN CREATININE: Status: ACTIVE | Noted: 2024-03-18

## 2024-03-18 PROCEDURE — G2211 COMPLEX E/M VISIT ADD ON: HCPCS | Performed by: INTERNAL MEDICINE

## 2024-03-18 PROCEDURE — 99214 OFFICE O/P EST MOD 30 MIN: CPT | Performed by: INTERNAL MEDICINE

## 2024-03-18 RX ORDER — KETOTIFEN FUMARATE 0.25 MG/ML
1 SOLUTION/ DROPS OPHTHALMIC
COMMUNITY

## 2024-03-18 NOTE — ASSESSMENT & PLAN NOTE
Potentially due to volume depletion in the setting of viral infection, and also use a supplement which may have a natural NSAID.    Patient will reassess labs in about 1 month and then go from there.

## 2024-03-18 NOTE — PROGRESS NOTES
[facility] [place]    Name: Meghna Granda  YOB: 1933      Assessment/Plan:    Stage 3a chronic kidney disease (HCC)  Lab Results   Component Value Date    EGFR 37 03/13/2024    EGFR 45 11/15/2023    EGFR 40 10/26/2023    CREATININE 1.26 03/13/2024    CREATININE 1.08 11/15/2023    CREATININE 1.19 10/26/2023     Kidney function had initially improved stopping tumor, creatinine now higher than previous and nearly 1.3 mg/dL.    Essential hypertension  Blood pressure well-controlled, continue with current medications at this time.  Continue to encourage low sodium diet.    Hyponatremia  Serum sodium levels appropriate at 136 mmol/L.  Continue with mild fluid restriction.    Increase in creatinine  Potentially due to volume depletion in the setting of viral infection, and also use a supplement which may have a natural NSAID.    Patient will reassess labs in about 1 month and then go from there.         Problem List Items Addressed This Visit          Cardiovascular and Mediastinum    Essential hypertension     Blood pressure well-controlled, continue with current medications at this time.  Continue to encourage low sodium diet.            Genitourinary    Stage 3a chronic kidney disease (HCC) - Primary     Lab Results   Component Value Date    EGFR 37 03/13/2024    EGFR 45 11/15/2023    EGFR 40 10/26/2023    CREATININE 1.26 03/13/2024    CREATININE 1.08 11/15/2023    CREATININE 1.19 10/26/2023     Kidney function had initially improved stopping tumor, creatinine now higher than previous and nearly 1.3 mg/dL.         Relevant Orders    Basic metabolic panel    Comprehensive metabolic panel    CBC and differential    Albumin / creatinine urine ratio    Urinalysis with microscopic    Magnesium    PTH, intact    Vitamin D 25 hydroxy       Other    Hyponatremia     Serum sodium levels appropriate at 136 mmol/L.  Continue with mild fluid restriction.          Increase in creatinine     Potentially due to volume depletion in the setting of viral infection, and also use a supplement which may have a natural NSAID.    Patient will reassess labs in about 1 month and then go from there.         Relevant Orders    Basic metabolic panel         Patient's kidney function is lower than previous check, with goal estimated GFR between 45-50 mL/min.  Patient will increase fluid intake, avoid nephrotoxins, and reassess labs in 1 month.  So long as all is well we will see her back appointment in 6 months.    Subjective:      Patient ID: Meghna Granda is a 89 y.o. female.    Patient presents for follow up.    We reviewed labs in detail, most recent creatinine noted to be 1.26 mg/dL, estimate GFR 37 mL/min.  Patient's kidney function is lower than usual, she notes that prior to this blood work, she was experiencing a viral infection with diarrhea along with nausea and vomiting.  She is also taking supplemental she may have endometrial nonsteroidal anti-inflammatory medication.    There were no significant electrolyte abnormalities noted.  Patient is taking all medications as prescribed with no specific side effects and denies use of pharmaceutical grade nonsteroid anti-inflammatory medications.          Hypertension  This is a chronic problem. The current episode started more than 1 year ago. The problem is unchanged. The problem is controlled. Pertinent negatives include no chest pain, orthopnea or peripheral edema. There are no associated agents to hypertension. Risk factors for coronary artery disease include post-menopausal state and sedentary lifestyle. Past treatments include lifestyle changes and beta blockers. There are no compliance problems.  Hypertensive end-organ damage includes kidney disease. Identifiable causes of hypertension include chronic renal disease.       The following portions of the patient's history were reviewed and updated as appropriate: allergies,  current medications, past family history, past medical history, past social history, past surgical history and problem list.    Review of Systems   Cardiovascular:  Negative for chest pain and orthopnea.   All other systems reviewed and are negative.        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   Tobacco Use    Smoking status: Never    Smokeless tobacco: Never   Vaping Use    Vaping status: Never Used   Substance and Sexual Activity    Alcohol use: No    Drug use: No    Sexual activity: Not Currently   Other Topics Concern    None   Social History Narrative    No caffeine use     Social Determinants of Health     Financial Resource Strain: Low Risk  (11/1/2023)    Overall Financial Resource Strain (CARDIA)     Difficulty of Paying Living Expenses: Not hard at all   Food Insecurity: Not on file   Transportation Needs: No Transportation Needs (11/1/2023)    PRAPARE - Transportation     Lack of Transportation (Medical): No     Lack of Transportation (Non-Medical): No   Physical Activity: Not on file   Stress: Not on file   Social Connections: Not on file   Intimate Partner Violence: Not on file   Housing Stability: Not on file     Past Medical History:   Diagnosis Date    Cardiac arrhythmia     Coronary artery disease     Disease of thyroid gland     Esophageal reflux     GERD (gastroesophageal reflux disease)     Hypertension     Hypothyroidism     Shingles 11-    Supraventricular tachycardia 7/27/2016    Varicella-zoster infection      Past Surgical History:   Procedure Laterality Date    CHOLECYSTECTOMY      COLON SURGERY      CORONARY ANGIOPLASTY      HYSTERECTOMY  1968    PARTIAL HYSTERECTOMY      SMALL INTESTINE SURGERY      Partial    TONSILLECTOMY  age 19       Current Outpatient  "Medications:     aspirin 81 MG tablet, Take 81 mg by mouth every other day , Disp: , Rfl:     ketotifen (ZADITOR) 0.025 % ophthalmic solution, Administer 1 drop to both eyes Three to four times a day, Disp: , Rfl:     levothyroxine 75 mcg tablet, Take 1 tablet by mouth once daily, Disp: 90 tablet, Rfl: 0    metoprolol succinate (TOPROL-XL) 25 mg 24 hr tablet, Take 1/2 (one-half) tablet by mouth once daily, Disp: 45 tablet, Rfl: 3    Misc Natural Products (Grape Seed Complex) CAPS, Take by mouth One a day, Disp: , Rfl:     PREVIDENT 5000 PLUS 1.1 % CREA, daily, Disp: , Rfl: 0    Tart Cherry 1200 MG CAPS, Take by mouth 500mg   2 daily, Disp: , Rfl:     Lab Results   Component Value Date    SODIUM 136 03/13/2024    K 4.2 03/13/2024     03/13/2024    CO2 25 03/13/2024    AGAP 10 03/13/2024    BUN 29 (H) 03/13/2024    CREATININE 1.26 03/13/2024    GLUC 97 01/13/2020    GLUF 95 03/13/2024    CALCIUM 9.0 03/13/2024    AST 21 03/13/2024    ALT 13 03/13/2024    ALKPHOS 87 03/13/2024    TP 7.1 03/13/2024    TBILI 0.96 03/13/2024    EGFR 37 03/13/2024     Lab Results   Component Value Date    WBC 5.10 03/13/2024    HGB 13.5 03/13/2024    HCT 42.9 03/13/2024    MCV 97 03/13/2024     03/13/2024     Lab Results   Component Value Date    CHOLESTEROL 196 10/26/2023    CHOLESTEROL 183 09/26/2022    CHOLESTEROL 199 10/05/2021     Lab Results   Component Value Date    HDL 74 10/26/2023    HDL 72 09/26/2022    HDL 75 10/05/2021     Lab Results   Component Value Date    LDLCALC 109 (H) 10/26/2023    LDLCALC 101 (H) 09/26/2022    LDLCALC 112 (H) 10/05/2021     Lab Results   Component Value Date    TRIG 63 10/26/2023    TRIG 52 09/26/2022    TRIG 59 10/05/2021     No results found for: \"CHOLHDL\"  Lab Results   Component Value Date    AYI7UFOZOAER 4.357 10/26/2023     Lab Results   Component Value Date    PTH 55.9 03/13/2024    CALCIUM 9.0 03/13/2024    PHOS 3.5 11/15/2023     No results found for: \"SPEP\", \"UPEP\"  No results " "found for: \"MICROALBUR\", \"[id number]\"        Objective:      /80 (BP Location: Left arm, Patient Position: Sitting, Cuff Size: Standard)   Pulse 71   Ht 4' 11\" (1.499 m)   Wt 59.4 kg (131 lb)   SpO2 96%   BMI 26.46 kg/m²          Physical Exam  Vitals reviewed.   Constitutional:       General: She is not in acute distress.     Appearance: Normal appearance.   HENT:      Head: Normocephalic and atraumatic.      Right Ear: External ear normal.      Left Ear: External ear normal.   Eyes:      Conjunctiva/sclera: Conjunctivae normal.   Cardiovascular:      Rate and Rhythm: Normal rate and regular rhythm.      Heart sounds: Normal heart sounds.   Pulmonary:      Effort: No respiratory distress.      Breath sounds: No wheezing.   Abdominal:      Palpations: Abdomen is soft.   Skin:     General: Skin is warm and dry.   Neurological:      General: No focal deficit present.      Mental Status: She is alert and oriented to person, place, and time.   Psychiatric:         Mood and Affect: Mood normal.         Behavior: Behavior normal.         "

## 2024-03-18 NOTE — ASSESSMENT & PLAN NOTE
Lab Results   Component Value Date    EGFR 37 03/13/2024    EGFR 45 11/15/2023    EGFR 40 10/26/2023    CREATININE 1.26 03/13/2024    CREATININE 1.08 11/15/2023    CREATININE 1.19 10/26/2023     Kidney function had initially improved stopping tumor, creatinine now higher than previous and nearly 1.3 mg/dL.

## 2024-03-18 NOTE — ASSESSMENT & PLAN NOTE
Blood pressure well-controlled, continue with current medications at this time.  Continue to encourage low sodium diet.

## 2024-04-16 ENCOUNTER — APPOINTMENT (OUTPATIENT)
Dept: LAB | Facility: CLINIC | Age: 89
End: 2024-04-16
Payer: COMMERCIAL

## 2024-04-16 DIAGNOSIS — IMO0002 INCREASE IN CREATININE: ICD-10-CM

## 2024-04-16 DIAGNOSIS — N18.31 STAGE 3A CHRONIC KIDNEY DISEASE (HCC): ICD-10-CM

## 2024-04-16 LAB
ANION GAP SERPL CALCULATED.3IONS-SCNC: 9 MMOL/L (ref 4–13)
BUN SERPL-MCNC: 18 MG/DL (ref 5–25)
CALCIUM SERPL-MCNC: 8.7 MG/DL (ref 8.4–10.2)
CHLORIDE SERPL-SCNC: 100 MMOL/L (ref 96–108)
CO2 SERPL-SCNC: 27 MMOL/L (ref 21–32)
CREAT SERPL-MCNC: 0.98 MG/DL (ref 0.6–1.3)
GFR SERPL CREATININE-BSD FRML MDRD: 50 ML/MIN/1.73SQ M
GLUCOSE SERPL-MCNC: 86 MG/DL (ref 65–140)
POTASSIUM SERPL-SCNC: 4.3 MMOL/L (ref 3.5–5.3)
SODIUM SERPL-SCNC: 136 MMOL/L (ref 135–147)

## 2024-04-16 PROCEDURE — 36415 COLL VENOUS BLD VENIPUNCTURE: CPT

## 2024-04-16 PROCEDURE — 80048 BASIC METABOLIC PNL TOTAL CA: CPT

## 2024-05-01 DIAGNOSIS — E03.9 ACQUIRED HYPOTHYROIDISM: ICD-10-CM

## 2024-05-02 RX ORDER — LEVOTHYROXINE SODIUM 0.07 MG/1
75 TABLET ORAL DAILY
Qty: 90 TABLET | Refills: 1 | Status: SHIPPED | OUTPATIENT
Start: 2024-05-02

## 2024-05-09 ENCOUNTER — OFFICE VISIT (OUTPATIENT)
Dept: CARDIOLOGY CLINIC | Facility: CLINIC | Age: 89
End: 2024-05-09
Payer: COMMERCIAL

## 2024-05-09 VITALS
WEIGHT: 130 LBS | HEART RATE: 60 BPM | OXYGEN SATURATION: 96 % | BODY MASS INDEX: 26.26 KG/M2 | DIASTOLIC BLOOD PRESSURE: 70 MMHG | SYSTOLIC BLOOD PRESSURE: 134 MMHG

## 2024-05-09 DIAGNOSIS — Z98.890 HISTORY OF RADIOFREQUENCY ABLATION (RFA) PROCEDURE FOR CARDIAC ARRHYTHMIA: ICD-10-CM

## 2024-05-09 DIAGNOSIS — I25.10 CORONARY ARTERY DISEASE INVOLVING NATIVE CORONARY ARTERY OF NATIVE HEART WITHOUT ANGINA PECTORIS: Primary | ICD-10-CM

## 2024-05-09 DIAGNOSIS — I10 ESSENTIAL HYPERTENSION: ICD-10-CM

## 2024-05-09 DIAGNOSIS — E78.00 HYPERCHOLESTEROLEMIA: ICD-10-CM

## 2024-05-09 PROCEDURE — 93000 ELECTROCARDIOGRAM COMPLETE: CPT | Performed by: INTERNAL MEDICINE

## 2024-05-09 PROCEDURE — 99214 OFFICE O/P EST MOD 30 MIN: CPT | Performed by: INTERNAL MEDICINE

## 2024-05-09 NOTE — PROGRESS NOTES
Cardiology Follow Up    Meghna Granda  8/15/1933  [id number]  [facility]ATH [facility]  [address] [facility] [address]  [phone]    1. Coronary artery disease involving native coronary artery of native heart without angina pectoris        2. Essential hypertension        3. Hypercholesterolemia        4. History of radiofrequency ablation (RFA) procedure for cardiac arrhythmia            Interval History: Cardiology follow-up.  Patient continues to do mostly well from the cardiac point of view, denies any chest pain or significant dyspnea, ambulation is limited because of DJD.  Overall she is active.  She stated compliant with low-cholesterol diet.  Lipids last year total cholesterol 196, HDL 74, , suboptimal control in the setting of statin intolerance, unwillingness to take.  No orthopnea no PND.  No significant bleeding issues on chronic aspirin therapy.  Denies syncope or presyncope.  She does have mild hyponatremia questionable mild SIADH.  Denies any palpitations.    Patient Active Problem List   Diagnosis    Coronary artery disease involving native coronary artery of native heart without angina pectoris    Hypercholesterolemia    Negative depression screening    History of radiofrequency ablation (RFA) procedure for cardiac arrhythmia    Overweight (BMI 25.0-29.9)    Acquired hypothyroidism    Age related osteoporosis    Hyponatremia    Essential hypertension    Dyspnea on exertion    Stage 3a chronic kidney disease (HCC)    Increase in creatinine     Past Medical History:   Diagnosis Date    Cardiac arrhythmia     Coronary artery disease     Disease of thyroid gland     Esophageal reflux     GERD (gastroesophageal reflux disease)     Hypertension     Hypothyroidism     Shingles 11-    Supraventricular tachycardia 7/27/2016    Varicella-zoster infection      Social History     Socioeconomic History    Marital  status:      Spouse name: Not on file    Number of children: 3    Years of education: Not on file    Highest education level: Not on file   Occupational History    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   Tobacco Use    Smoking status: Never    Smokeless tobacco: Never   Vaping Use    Vaping status: Never Used   Substance and Sexual Activity    Alcohol use: No    Drug use: No    Sexual activity: Not Currently   Other Topics Concern    Not on file   Social History Narrative    No caffeine use     Social Determinants of Health     Financial Resource Strain: Low Risk  (11/1/2023)    Overall Financial Resource Strain (CARDIA)     Difficulty of Paying Living Expenses: Not hard at all   Food Insecurity: Not on file   Transportation Needs: No Transportation Needs (11/1/2023)    PRAPARE - Transportation     Lack of Transportation (Medical): No     Lack of Transportation (Non-Medical): No   Physical Activity: Not on file   Stress: Not on file   Social Connections: Not on file   Intimate Partner Violence: Not on file   Housing Stability: Not on file      Family History   Problem Relation Age of Onset    Heart disease Mother     Heart disease Father     COPD Father      Past Surgical History:   Procedure Laterality Date    CHOLECYSTECTOMY      COLON SURGERY      CORONARY ANGIOPLASTY      HYSTERECTOMY  1968    PARTIAL HYSTERECTOMY      SMALL INTESTINE SURGERY      Partial    TONSILLECTOMY  age 19       Current Outpatient Medications:     aspirin 81 MG tablet, Take 81 mg by mouth every other day , Disp: , Rfl:     ketotifen (ZADITOR) 0.025 % ophthalmic solution, Administer 1 drop to both eyes Three to four times a day, Disp: , Rfl:     levothyroxine 75 mcg tablet, Take 1 tablet by mouth once daily, Disp: 90 tablet, Rfl: 1    metoprolol succinate (TOPROL-XL) 25 mg 24 hr tablet, Take 1/2 (one-half) tablet by mouth once daily, Disp: 45 tablet, Rfl: 3    Misc Natural Products (Grape Seed Complex) CAPS, Take by  mouth One a day, Disp: , Rfl:     PREVIDENT 5000 PLUS 1.1 % CREA, daily, Disp: , Rfl: 0    Tart Cherry 1200 MG CAPS, Take by mouth 500mg   2 daily, Disp: , Rfl:   Allergies   Allergen Reactions    Bextra [Valdecoxib]     Codeine Nausea Only, Headache and Tremor    Demerol [Meperidine] Nausea Only and Headache    Diovan [Valsartan]     Enalapril Cough    Lisinopril Cough    Penicillins     Percocet [Oxycodone-Acetaminophen]     Sulfa Antibiotics Abdominal Pain    Tramadol Nausea Only and Headache    Vicodin [Hydrocodone-Acetaminophen]     Zithromax [Azithromycin] Diarrhea    Zocor [Simvastatin] Hives       Labs:  Appointment on 04/16/2024   Component Date Value    Sodium 04/16/2024 136     Potassium 04/16/2024 4.3     Chloride 04/16/2024 100     CO2 04/16/2024 27     ANION GAP 04/16/2024 9     BUN 04/16/2024 18     Creatinine 04/16/2024 0.98     Glucose 04/16/2024 86     Calcium 04/16/2024 8.7     eGFR 04/16/2024 50    Appointment on 03/13/2024   Component Date Value    Creatinine, Ur 03/13/2024 76.3     Albumin,U,Random 03/13/2024 14.3     Albumin Creat Ratio 03/13/2024 19     Color, UA 03/13/2024 Light Yellow     Clarity, UA 03/13/2024 Clear     Specific Gravity, UA 03/13/2024 1.013     pH, UA 03/13/2024 6.0     Leukocytes, UA 03/13/2024 Moderate (A)     Nitrite, UA 03/13/2024 Positive (A)     Protein, UA 03/13/2024 Negative     Glucose, UA 03/13/2024 Negative     Ketones, UA 03/13/2024 Negative     Urobilinogen, UA 03/13/2024 <2.0     Bilirubin, UA 03/13/2024 Negative     Occult Blood, UA 03/13/2024 Negative     RBC, UA 03/13/2024 1-2     WBC, UA 03/13/2024 10-20 (A)     Epithelial Cells 03/13/2024 Occasional     Bacteria, UA 03/13/2024 Moderate (A)     Sodium 03/13/2024 136     Potassium 03/13/2024 4.2     Chloride 03/13/2024 101     CO2 03/13/2024 25     ANION GAP 03/13/2024 10     BUN 03/13/2024 29 (H)     Creatinine 03/13/2024 1.26     Glucose, Fasting 03/13/2024 95     Calcium 03/13/2024 9.0     AST  03/13/2024 21     ALT 03/13/2024 13     Alkaline Phosphatase 03/13/2024 87     Total Protein 03/13/2024 7.1     Albumin 03/13/2024 4.1     Total Bilirubin 03/13/2024 0.96     eGFR 03/13/2024 37     WBC 03/13/2024 5.10     RBC 03/13/2024 4.41     Hemoglobin 03/13/2024 13.5     Hematocrit 03/13/2024 42.9     MCV 03/13/2024 97     MCH 03/13/2024 30.6     MCHC 03/13/2024 31.5     RDW 03/13/2024 13.9     MPV 03/13/2024 9.1     Platelets 03/13/2024 333     nRBC 03/13/2024 0     Segmented % 03/13/2024 37 (L)     Immature Grans % 03/13/2024 0     Lymphocytes % 03/13/2024 48 (H)     Monocytes % 03/13/2024 10     Eosinophils Relative 03/13/2024 4     Basophils Relative 03/13/2024 1     Absolute Neutrophils 03/13/2024 1.86     Absolute Immature Grans 03/13/2024 0.00     Absolute Lymphocytes 03/13/2024 2.46     Absolute Monocytes 03/13/2024 0.53     Eosinophils Absolute 03/13/2024 0.18     Basophils Absolute 03/13/2024 0.07     Magnesium 03/13/2024 2.1     PTH 03/13/2024 55.9    Appointment on 11/15/2023   Component Date Value    Creatinine, Ur 11/15/2023 124.0     Albumin,U,Random 11/15/2023 42.6 (H)     Albumin Creat Ratio 11/15/2023 34 (H)     Color, UA 11/15/2023 Yellow     Clarity, UA 11/15/2023 Turbid     Specific Gravity, UA 11/15/2023 1.015     pH, UA 11/15/2023 6.0     Leukocytes, UA 11/15/2023 Large (A)     Nitrite, UA 11/15/2023 Positive (A)     Protein, UA 11/15/2023 Trace (A)     Glucose, UA 11/15/2023 Negative     Ketones, UA 11/15/2023 Negative     Urobilinogen, UA 11/15/2023 <2.0     Bilirubin, UA 11/15/2023 Negative     Occult Blood, UA 11/15/2023 Trace (A)     RBC, UA 11/15/2023 1-2     WBC, UA 11/15/2023 Innumerable (A)     Epithelial Cells 11/15/2023 Occasional     Bacteria, UA 11/15/2023 Moderate (A)     MUCUS THREADS 11/15/2023 Occasional (A)     Hyaline Casts, UA 11/15/2023 0-3 (A)     WBC Clumps 11/15/2023 Present     Sodium 11/15/2023 135     Potassium 11/15/2023 4.6     Chloride 11/15/2023 99     CO2  11/15/2023 25     ANION GAP 11/15/2023 11     BUN 11/15/2023 18     Creatinine 11/15/2023 1.08     Glucose, Fasting 11/15/2023 94     Calcium 11/15/2023 9.5     AST 11/15/2023 24     ALT 11/15/2023 20     Alkaline Phosphatase 11/15/2023 84     Total Protein 11/15/2023 7.6     Albumin 11/15/2023 4.2     Total Bilirubin 11/15/2023 0.93     eGFR 11/15/2023 45     Magnesium 11/15/2023 2.2     Phosphorus 11/15/2023 3.5     PTH 11/15/2023 56.0     Vit D, 25-Hydroxy 11/15/2023 49.7     Osmolality, Ur 11/16/2023 282     Cortisol - AM 11/15/2023 14.4      Imaging: No results found.    Review of Systems:  Review of Systems   Constitutional:  Positive for fatigue. Negative for activity change, diaphoresis and fever.   HENT:  Positive for hearing loss. Negative for nosebleeds.    Eyes:  Negative for visual disturbance.   Respiratory:  Negative for apnea, shortness of breath, wheezing and stridor.    Cardiovascular:  Negative for chest pain, palpitations and leg swelling.   Gastrointestinal:  Negative for anal bleeding and blood in stool.   Genitourinary:  Negative for hematuria.   Musculoskeletal:  Positive for arthralgias and gait problem.   Neurological:  Negative for syncope.   Hematological:  Bruises/bleeds easily.   Psychiatric/Behavioral:  Negative for sleep disturbance. The patient is not nervous/anxious.        Physical Exam:  Physical Exam  Vitals reviewed.   Constitutional:       General: She is not in acute distress.     Appearance: She is normal weight. She is not ill-appearing, toxic-appearing or diaphoretic.   Eyes:      General: No scleral icterus.  Neck:      Vascular: No carotid bruit.   Cardiovascular:      Rate and Rhythm: Normal rate and regular rhythm.      Pulses: Normal pulses.      Heart sounds: Normal heart sounds. No murmur heard.     No friction rub. No gallop.   Pulmonary:      Effort: Pulmonary effort is normal. No respiratory distress.      Breath sounds: Normal breath sounds. No stridor. No  wheezing, rhonchi or rales.   Musculoskeletal:      Right lower leg: No edema.      Left lower leg: No edema.   Skin:     General: Skin is warm and dry.      Capillary Refill: Capillary refill takes less than 2 seconds.      Coloration: Skin is not jaundiced or pale.      Findings: Bruising present. No erythema.   Neurological:      Mental Status: She is alert and oriented to person, place, and time.   Psychiatric:         Mood and Affect: Mood normal.         Discussion/Summary:  Coronary artery disease, PTCA stent of the LAD in 2008. Last stress test 2018, she did 4 minutes of Doug protocol, achieving target heart rate, there were no EKG criteria for ischemia symptoms, dyspnea appears to be angina equivalent .   longstanding untreated dyslipidemia because of intolerance and unwillingness to take statin therapy.  Previously, she appeared to be more willing and will try low-dose rosuvastatin every other day followed by every day in 2 weeks.  Even at that regimen she could not tolerate it.  Symptoms are consistent or possibly consistent with angina equivalent, we previously did order a pharmacological stress test as she will not be able to walk on a treadmill.  She did not proceed with testing.  echocardiogram.  2022 revealed normal left ventricular systolic function with stage I diastolic dysfunction interestingly so, there was mild left atrial enlargement and mild aortic insufficiency, mild mitral and tricuspid insufficiency with estimated normal pulmonary pressures suggested by the criteria, aortic root was described as mildly dilated measuring 37 mm which I think is an overcall.   Supraventricular tachycardia, AVNRT, status post RFA ablation of the slow pathway.  In 2016, no clinical recurrences.  Last creatinine 1.2, GFR in the 40s.         This note was completed in part utilizing [employer] fluency direct voice recognition software.   Grammatical errors, random word insertion, spelling mistakes, and incomplete  sentences may be an occasional consequence of the system secondary to software limitations, ambient noise and hardware issues. At the time of dictation, efforts were made to edit, clarify and /or correct errors.  Please read the chart carefully and recognize, using context, where substitutions have occurred.  If you have any questions or concerns about the context, text or information contained within the body of this dictation, please contact myself, the provider, for further clarification.

## 2024-05-16 ENCOUNTER — OFFICE VISIT (OUTPATIENT)
Dept: FAMILY MEDICINE CLINIC | Facility: CLINIC | Age: 89
End: 2024-05-16
Payer: COMMERCIAL

## 2024-05-16 VITALS
TEMPERATURE: 97.8 F | WEIGHT: 132 LBS | HEART RATE: 78 BPM | DIASTOLIC BLOOD PRESSURE: 80 MMHG | OXYGEN SATURATION: 98 % | HEIGHT: 59 IN | SYSTOLIC BLOOD PRESSURE: 130 MMHG | BODY MASS INDEX: 26.61 KG/M2

## 2024-05-16 DIAGNOSIS — M25.562 CHRONIC PAIN OF BOTH KNEES: ICD-10-CM

## 2024-05-16 DIAGNOSIS — E03.9 ACQUIRED HYPOTHYROIDISM: ICD-10-CM

## 2024-05-16 DIAGNOSIS — N18.31 STAGE 3A CHRONIC KIDNEY DISEASE (HCC): Primary | ICD-10-CM

## 2024-05-16 DIAGNOSIS — G89.29 CHRONIC PAIN OF BOTH KNEES: ICD-10-CM

## 2024-05-16 DIAGNOSIS — R53.1 WEAKNESS: ICD-10-CM

## 2024-05-16 DIAGNOSIS — M25.561 CHRONIC PAIN OF BOTH KNEES: ICD-10-CM

## 2024-05-16 DIAGNOSIS — M25.552 PAIN OF LEFT HIP: ICD-10-CM

## 2024-05-16 DIAGNOSIS — E78.00 HYPERCHOLESTEROLEMIA: ICD-10-CM

## 2024-05-16 PROCEDURE — G2211 COMPLEX E/M VISIT ADD ON: HCPCS | Performed by: NURSE PRACTITIONER

## 2024-05-16 PROCEDURE — 99214 OFFICE O/P EST MOD 30 MIN: CPT | Performed by: NURSE PRACTITIONER

## 2024-05-16 NOTE — PROGRESS NOTES
Ambulatory Visit  Name: Meghna Granda      : 8/15/1933      MRN: [id number]  Encounter Provider: SHILA Cee  Encounter Date: 2024   Encounter department: [facility]    Assessment & Plan   1. Stage 3a chronic kidney disease (HCC)  -     CBC and differential; Future  -     Comprehensive metabolic panel; Future  2. Acquired hypothyroidism  -     TSH, 3rd generation with Free T4 reflex; Future  3. Hypercholesterolemia  4. Weakness  -     CBC and differential; Future  -     Comprehensive metabolic panel; Future  -     Urinalysis with microscopic; Future  -     Urine culture; Future  5. Pain of left hip  -     XR hip/pelv 4+ vw left if performed; Future; Expected date: 2024  6. Chronic pain of both knees  -     XR knee 3 vw right non injury; Future; Expected date: 2024  -     XR knee 3 vw left non injury; Future; Expected date: 2024     History of Present Illness   {Disappearing Hyperlinks I Encounters * My Last Note * Since Last Visit * History :13673}  Patient presents for routine follow-up.  Patient overall has been feeling well up until about 3 weeks ago.  She states that she thinks she had food poisoning from eating food that was old as after she ate it she started to have abdominal pain, recurrent diarrhea and vomiting for approximately 1 week.  She states that those symptoms have resolved but since then she has been feeling some weakness in her legs.  She has been using her cane intermittently over the last 3 weeks when normally she does not need to./she does have chronic pain in the left hip and the knees b/l. Does sometimes feel like her knees are going to buckle due to this. Also, since a major fall three years ago, she feels like she has had some trouble with balance. Recommend using her cane to prevent falls. Also, follow up with PT and have labs completed.     HTN- blood pressure was elevated today upon arrival but at home very well  "controlled. She is taking her metoprolol 25 mg daily. Continue to monitor BP occasionally at home. Log scanned into her chart.         Review of Systems   Constitutional:  Negative for chills and fever.   Respiratory:  Negative for cough and shortness of breath.    Cardiovascular:  Negative for chest pain and palpitations.   Gastrointestinal:  Negative for abdominal pain, constipation, diarrhea, nausea and vomiting.   Genitourinary:  Negative for dysuria and hematuria.   Musculoskeletal:  Positive for arthralgias. Negative for back pain, myalgias, neck pain and neck stiffness.   Skin:  Negative for color change and rash.   Neurological:  Positive for weakness. Negative for dizziness, seizures, syncope and headaches.   All other systems reviewed and are negative.      Objective   {Disappearing Hyperlinks   Review Vitals * Enter New Vitals * Results Review * Labs * Imaging * Cardiology * Procedures * Lung Cancer Screening :72862}  /80   Pulse 78   Temp 97.8 °F (36.6 °C)   Ht 4' 11\" (1.499 m)   Wt 59.9 kg (132 lb)   SpO2 98%   BMI 26.66 kg/m²     Physical Exam  Vitals and nursing note reviewed.   Constitutional:       General: She is not in acute distress.     Appearance: Normal appearance. She is normal weight. She is not ill-appearing or toxic-appearing.   Cardiovascular:      Rate and Rhythm: Normal rate and regular rhythm.      Pulses: Normal pulses.      Heart sounds: Normal heart sounds. No murmur heard.     No friction rub. No gallop.   Pulmonary:      Effort: Pulmonary effort is normal.      Breath sounds: Normal breath sounds. No wheezing, rhonchi or rales.   Musculoskeletal:         General: No deformity. Normal range of motion.      Left hip: Tenderness present. No deformity, lacerations, bony tenderness or crepitus. Normal range of motion. Normal strength.      Right knee: No crepitus. Normal range of motion.      Left knee: No crepitus. Normal range of motion.      Right lower leg: No edema. "      Left lower leg: No edema.   Neurological:      General: No focal deficit present.      Mental Status: She is alert and oriented to person, place, and time. Mental status is at baseline.   Psychiatric:         Mood and Affect: Mood normal.         Behavior: Behavior normal.         Thought Content: Thought content normal.         Judgment: Judgment normal.       Administrative Statements {Disappearing Hyperlinks I  Level of Service * [facility]:94806}

## 2024-05-23 ENCOUNTER — APPOINTMENT (OUTPATIENT)
Dept: LAB | Facility: CLINIC | Age: 89
End: 2024-05-23
Payer: COMMERCIAL

## 2024-05-23 ENCOUNTER — APPOINTMENT (OUTPATIENT)
Dept: RADIOLOGY | Facility: CLINIC | Age: 89
End: 2024-05-23
Payer: COMMERCIAL

## 2024-05-23 DIAGNOSIS — M25.562 CHRONIC PAIN OF BOTH KNEES: ICD-10-CM

## 2024-05-23 DIAGNOSIS — R53.1 WEAKNESS: ICD-10-CM

## 2024-05-23 DIAGNOSIS — M25.561 CHRONIC PAIN OF BOTH KNEES: ICD-10-CM

## 2024-05-23 DIAGNOSIS — N18.31 STAGE 3A CHRONIC KIDNEY DISEASE (HCC): ICD-10-CM

## 2024-05-23 DIAGNOSIS — G89.29 CHRONIC PAIN OF BOTH KNEES: ICD-10-CM

## 2024-05-23 DIAGNOSIS — E03.9 ACQUIRED HYPOTHYROIDISM: ICD-10-CM

## 2024-05-23 DIAGNOSIS — M25.552 PAIN OF LEFT HIP: ICD-10-CM

## 2024-05-23 LAB
ALBUMIN SERPL BCP-MCNC: 4.1 G/DL (ref 3.5–5)
ALP SERPL-CCNC: 92 U/L (ref 34–104)
ALT SERPL W P-5'-P-CCNC: 13 U/L (ref 7–52)
ANION GAP SERPL CALCULATED.3IONS-SCNC: 11 MMOL/L (ref 4–13)
AST SERPL W P-5'-P-CCNC: 20 U/L (ref 13–39)
BASOPHILS # BLD AUTO: 0.04 THOUSANDS/ÂΜL (ref 0–0.1)
BASOPHILS NFR BLD AUTO: 1 % (ref 0–1)
BILIRUB SERPL-MCNC: 0.85 MG/DL (ref 0.2–1)
BUN SERPL-MCNC: 20 MG/DL (ref 5–25)
CALCIUM SERPL-MCNC: 8.9 MG/DL (ref 8.4–10.2)
CHLORIDE SERPL-SCNC: 101 MMOL/L (ref 96–108)
CO2 SERPL-SCNC: 23 MMOL/L (ref 21–32)
CREAT SERPL-MCNC: 1.02 MG/DL (ref 0.6–1.3)
EOSINOPHIL # BLD AUTO: 0.07 THOUSAND/ÂΜL (ref 0–0.61)
EOSINOPHIL NFR BLD AUTO: 2 % (ref 0–6)
ERYTHROCYTE [DISTWIDTH] IN BLOOD BY AUTOMATED COUNT: 13.2 % (ref 11.6–15.1)
GFR SERPL CREATININE-BSD FRML MDRD: 48 ML/MIN/1.73SQ M
GLUCOSE P FAST SERPL-MCNC: 95 MG/DL (ref 65–99)
HCT VFR BLD AUTO: 41.4 % (ref 34.8–46.1)
HGB BLD-MCNC: 13.3 G/DL (ref 11.5–15.4)
IMM GRANULOCYTES # BLD AUTO: 0.01 THOUSAND/UL (ref 0–0.2)
IMM GRANULOCYTES NFR BLD AUTO: 0 % (ref 0–2)
LYMPHOCYTES # BLD AUTO: 1.53 THOUSANDS/ÂΜL (ref 0.6–4.47)
LYMPHOCYTES NFR BLD AUTO: 38 % (ref 14–44)
MCH RBC QN AUTO: 30.9 PG (ref 26.8–34.3)
MCHC RBC AUTO-ENTMCNC: 32.1 G/DL (ref 31.4–37.4)
MCV RBC AUTO: 96 FL (ref 82–98)
MONOCYTES # BLD AUTO: 0.41 THOUSAND/ÂΜL (ref 0.17–1.22)
MONOCYTES NFR BLD AUTO: 10 % (ref 4–12)
NEUTROPHILS # BLD AUTO: 1.97 THOUSANDS/ÂΜL (ref 1.85–7.62)
NEUTS SEG NFR BLD AUTO: 49 % (ref 43–75)
NRBC BLD AUTO-RTO: 0 /100 WBCS
PLATELET # BLD AUTO: 334 THOUSANDS/UL (ref 149–390)
PMV BLD AUTO: 9.2 FL (ref 8.9–12.7)
POTASSIUM SERPL-SCNC: 4 MMOL/L (ref 3.5–5.3)
PROT SERPL-MCNC: 7.1 G/DL (ref 6.4–8.4)
RBC # BLD AUTO: 4.31 MILLION/UL (ref 3.81–5.12)
SODIUM SERPL-SCNC: 135 MMOL/L (ref 135–147)
TSH SERPL DL<=0.05 MIU/L-ACNC: 2.37 UIU/ML (ref 0.45–4.5)
WBC # BLD AUTO: 4.03 THOUSAND/UL (ref 4.31–10.16)

## 2024-05-23 PROCEDURE — 85025 COMPLETE CBC W/AUTO DIFF WBC: CPT

## 2024-05-23 PROCEDURE — 73562 X-RAY EXAM OF KNEE 3: CPT

## 2024-05-23 PROCEDURE — 80053 COMPREHEN METABOLIC PANEL: CPT

## 2024-05-23 PROCEDURE — 73502 X-RAY EXAM HIP UNI 2-3 VIEWS: CPT

## 2024-05-23 PROCEDURE — 36415 COLL VENOUS BLD VENIPUNCTURE: CPT

## 2024-05-23 PROCEDURE — 84443 ASSAY THYROID STIM HORMONE: CPT

## 2024-05-24 ENCOUNTER — APPOINTMENT (OUTPATIENT)
Dept: LAB | Facility: CLINIC | Age: 89
End: 2024-05-24
Payer: COMMERCIAL

## 2024-05-24 LAB
BACTERIA UR QL AUTO: ABNORMAL /HPF
BILIRUB UR QL STRIP: NEGATIVE
CLARITY UR: ABNORMAL
COLOR UR: ABNORMAL
GLUCOSE UR STRIP-MCNC: NEGATIVE MG/DL
HGB UR QL STRIP.AUTO: NEGATIVE
KETONES UR STRIP-MCNC: NEGATIVE MG/DL
LEUKOCYTE ESTERASE UR QL STRIP: ABNORMAL
NITRITE UR QL STRIP: POSITIVE
NON-SQ EPI CELLS URNS QL MICRO: ABNORMAL /HPF
PH UR STRIP.AUTO: 6.5 [PH]
PROT UR STRIP-MCNC: NEGATIVE MG/DL
RBC #/AREA URNS AUTO: ABNORMAL /HPF
SP GR UR STRIP.AUTO: 1.01 (ref 1–1.03)
UROBILINOGEN UR STRIP-ACNC: <2 MG/DL
WBC #/AREA URNS AUTO: ABNORMAL /HPF

## 2024-05-24 PROCEDURE — 87086 URINE CULTURE/COLONY COUNT: CPT

## 2024-05-24 PROCEDURE — 87186 SC STD MICRODIL/AGAR DIL: CPT

## 2024-05-24 PROCEDURE — 81001 URINALYSIS AUTO W/SCOPE: CPT

## 2024-05-24 PROCEDURE — 87077 CULTURE AEROBIC IDENTIFY: CPT

## 2024-05-26 LAB — BACTERIA UR CULT: ABNORMAL

## 2024-05-28 ENCOUNTER — TELEPHONE (OUTPATIENT)
Dept: FAMILY MEDICINE CLINIC | Facility: CLINIC | Age: 89
End: 2024-05-28

## 2024-05-28 DIAGNOSIS — N39.0 URINARY TRACT INFECTION WITHOUT HEMATURIA, SITE UNSPECIFIED: Primary | ICD-10-CM

## 2024-05-28 RX ORDER — MINOCYCLINE HYDROCHLORIDE 100 MG/1
100 TABLET ORAL 2 TIMES DAILY
Qty: 10 TABLET | Refills: 0 | Status: SHIPPED | OUTPATIENT
Start: 2024-05-28 | End: 2024-06-02

## 2024-05-28 NOTE — TELEPHONE ENCOUNTER
Left  for pt.    SHILA Cee  5/28/2024  1:22 PM EDT       Patient does have a UTI. She has resistance to a lot of antibiotics. I called in minocycline 100 mg bid for 5 days. She needs to take this with a full glass of water and sit upright after taking for 30 minutes to prevent esophageal irritation. Thanks

## 2024-05-31 ENCOUNTER — TELEPHONE (OUTPATIENT)
Dept: FAMILY MEDICINE CLINIC | Facility: CLINIC | Age: 89
End: 2024-05-31

## 2024-05-31 NOTE — TELEPHONE ENCOUNTER
Funmilayo Vogel  5/31/2024 11:21 AM EDT Back to Top      Lm for pt to call the office.     Okay to give results. For lumbar back pain she can see pain management and can see ortho for her knees

## 2024-05-31 NOTE — TELEPHONE ENCOUNTER
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 Would recommend f.u with lumbar back.  Knee- degenerative changes moderate to severe , may benefit from injections. Can f/u with ortho

## 2024-05-31 NOTE — TELEPHONE ENCOUNTER
"Patients daughter Hina called back and was given imaging results. She is very concerned because the results on mychart state \"Prostate radiation seed implants\" and her daughter said she can assure us that Meghna does not have a prostate. She is now concerned that this is the wrong imaging results for patient and she is also thinking the knee imaging results may be incorrect as well. She feels these are the results of different patients since one mentions prostate. She does not want to proceed until she is made aware that these are Meghna's results.   "

## 2024-06-05 NOTE — TELEPHONE ENCOUNTER
Patient's daughter in law Hina called on patient's behalf. Hina is on patient's medical communication form.    RN advised on provider note:          Hina verbalized understanding, wondering if patient should follow up with [facility] for her back or if she should follow up with ortho provider. Hina states no referral was ever placed for PT for patient so she has not done that. States if there are orthopedic providers that come out, that would be beneficial. No further questions/concerns at this time.     Please advise.

## 2024-06-06 ENCOUNTER — HOME CARE VISIT (OUTPATIENT)
Dept: HOME HEALTH SERVICES | Facility: HOME HEALTHCARE | Age: 89
End: 2024-06-06

## 2024-06-06 DIAGNOSIS — M17.0 PRIMARY OSTEOARTHRITIS OF BOTH KNEES: ICD-10-CM

## 2024-06-06 DIAGNOSIS — M51.36 DDD (DEGENERATIVE DISC DISEASE), LUMBAR: Primary | ICD-10-CM

## 2024-06-06 NOTE — Clinical Note
I spoke to the patient's dtr Hina regarding home physical therapy. We discussed homebound criteria for our services. She reports that the patient is able to get out as needed and drive short distances. Hina and I agree that the patient does not meet homebound criteria. Hina would still like the patient to have physical therapy in her home. Please send a new referral for physical therapy to [facility] or [facility]. Patient not admitted to our services at this time as she is not homebound. Thank you

## 2024-06-14 NOTE — CASE COMMUNICATION
Spoke to Hina and she stated she does not need the orders placed and has appointment with orthopedics next week on 6/20/24 with Dr. Xie and are not doing physical therapy right now also has been having problematic diarrhea.

## 2024-06-20 ENCOUNTER — OFFICE VISIT (OUTPATIENT)
Dept: OBGYN CLINIC | Facility: CLINIC | Age: 89
End: 2024-06-20
Payer: COMMERCIAL

## 2024-06-20 VITALS
TEMPERATURE: 98.4 F | HEIGHT: 59 IN | DIASTOLIC BLOOD PRESSURE: 77 MMHG | WEIGHT: 128 LBS | SYSTOLIC BLOOD PRESSURE: 163 MMHG | BODY MASS INDEX: 25.8 KG/M2 | HEART RATE: 66 BPM

## 2024-06-20 DIAGNOSIS — M17.11 PRIMARY OSTEOARTHRITIS OF ONE KNEE, RIGHT: Primary | ICD-10-CM

## 2024-06-20 DIAGNOSIS — M17.0 PRIMARY OSTEOARTHRITIS OF BOTH KNEES: ICD-10-CM

## 2024-06-20 PROCEDURE — 99204 OFFICE O/P NEW MOD 45 MIN: CPT | Performed by: FAMILY MEDICINE

## 2024-06-20 PROCEDURE — 20610 DRAIN/INJ JOINT/BURSA W/O US: CPT | Performed by: FAMILY MEDICINE

## 2024-06-20 RX ORDER — ROPIVACAINE HYDROCHLORIDE 5 MG/ML
10 INJECTION, SOLUTION EPIDURAL; INFILTRATION; PERINEURAL
Status: COMPLETED | OUTPATIENT
Start: 2024-06-20 | End: 2024-06-20

## 2024-06-20 RX ORDER — TRIAMCINOLONE ACETONIDE 40 MG/ML
40 INJECTION, SUSPENSION INTRA-ARTICULAR; INTRAMUSCULAR
Status: COMPLETED | OUTPATIENT
Start: 2024-06-20 | End: 2024-06-20

## 2024-06-20 RX ADMIN — TRIAMCINOLONE ACETONIDE 40 MG: 40 INJECTION, SUSPENSION INTRA-ARTICULAR; INTRAMUSCULAR at 13:00

## 2024-06-20 RX ADMIN — ROPIVACAINE HYDROCHLORIDE 10 ML: 5 INJECTION, SOLUTION EPIDURAL; INFILTRATION; PERINEURAL at 13:00

## 2024-06-20 NOTE — LETTER
"June 20, 2024     Sreedhar Brand, [address]  [facility] [address]    Patient: Meghna Granda   YOB: 1933   Date of Visit: 6/20/2024       Dear Dr. Brand:    Thank you for referring Meghna Granda to me for evaluation. Below are my notes for this consultation.    If you have questions, please do not hesitate to call me. I look forward to following your patient along with you.         Sincerely,        Jose Barroso MD        CC: No Recipients    Jose Barroso MD  6/20/2024  1:22 PM  Incomplete  [facility] [address] [id number] [phone]      Assessment:  1. Primary osteoarthritis of both knees  -     Ambulatory Referral to Orthopedic Surgery      Plan:  There are no Patient Instructions on file for this visit.   No follow-ups on file.    Chief Complaint:  Chief Complaint   Patient presents with   • Left Knee - Pain   • Right Knee - Pain       Subjective:   HPI    Patient ID: Meghna Granda is a 89 y.o. female     Here c/o B knees pain  Pain for years  No recent fall/injury  No pain walking  Worse at night when laying down.  No pain meds  Worse stepping on R leg- hear crunches  Had injections years ago- helped    Review of Systems   Constitutional:  Negative for fatigue and fever.   Respiratory:  Negative for shortness of breath.    Cardiovascular:  Negative for chest pain.   Gastrointestinal:  Negative for abdominal pain and nausea.   Genitourinary:  Negative for dysuria.   Musculoskeletal:  Positive for arthralgias.   Skin:  Negative for rash and wound.   Neurological:  Negative for weakness and headaches.       Objective:  Vitals:  /77 (BP Location: Right arm, Patient Position: Sitting, Cuff Size: Standard)   Pulse 66   Temp 98.4 °F (36.9 °C) (Tympanic)   Ht 4' 11\" (1.499 m)   Wt 58.1 kg (128 lb)   BMI 25.85 kg/m²     The following portions of the patient's history were reviewed " and updated as appropriate: allergies, current medications, past family history, past medical history, past social history, past surgical history, and problem list.    Physical exam:  Physical Exam  Constitutional:       Appearance: Normal appearance. She is normal weight.   HENT:      Head: Normocephalic.   Eyes:      Extraocular Movements: Extraocular movements intact.   Pulmonary:      Effort: Pulmonary effort is normal.   Musculoskeletal:      Cervical back: Normal range of motion.      Right knee: No effusion.      Instability Tests: Medial Osman test positive. Lateral Osman test negative.      Left knee: No effusion.      Instability Tests: Medial Osman test negative and lateral Osman test negative.   Skin:     General: Skin is warm and dry.   Neurological:      General: No focal deficit present.      Mental Status: She is alert and oriented to person, place, and time. Mental status is at baseline.   Psychiatric:         Mood and Affect: Mood normal.         Behavior: Behavior normal.         Thought Content: Thought content normal.         Judgment: Judgment normal.       Right Knee Exam     Tenderness   The patient is experiencing tenderness in the medial joint line.    Range of Motion   The patient has normal right knee ROM.    Tests   Osman:  Medial - positive Lateral - negative  Varus: negative Valgus: negative    Other   Swelling: none  Effusion: no effusion present      Left Knee Exam     Tenderness   The patient is experiencing no tenderness.     Range of Motion   The patient has normal left knee ROM.    Tests   Osman:  Medial - negative Lateral - negative  Varus: negative Valgus: negative    Other   Swelling: none  Effusion: no effusion present          Observations   Left Knee   Negative for effusion.     Right Knee   Negative for effusion.       I have personally reviewed pertinent films in PACS and my interpretation is XR  B knee- medial joint space narrowing/mod/sev OA..    Large  "joint arthrocentesis: R knee  Universal Protocol:  Consent: Verbal consent obtained.  Risks and benefits: risks, benefits and alternatives were discussed  Consent given by: patient  Time out: Immediately prior to procedure a \"time out\" was called to verify the correct patient, procedure, equipment, support staff and site/side marked as required.  Timeout called at: 6/20/2024 1:22 PM.  Site marked: the operative site was marked  Supporting Documentation  Indications: pain   Procedure Details  Location: knee - R knee  Preparation: Patient was prepped and draped in the usual sterile fashion  Needle size: 25 G  Ultrasound guidance: no  Approach: anterolateral  Medications administered: 40 mg triamcinolone acetonide 40 mg/mL; 10 mL ropivacaine 0.5 %    Patient tolerance: patient tolerated the procedure well with no immediate complications  Dressing:  Sterile dressing applied          MD Jose Howe MD  6/20/2024  1:08 PM  Sign when Signing Visit  [facility] [address] [address]  [phone]      Assessment:  1. Primary osteoarthritis of both knees  -     Ambulatory Referral to Orthopedic Surgery      Plan:  There are no Patient Instructions on file for this visit.   No follow-ups on file.    Chief Complaint:  No chief complaint on file.      Subjective:   HPI    Patient ID: Meghna Granda is a 89 y.o. female     Review of Systems   Constitutional:  Negative for fatigue and fever.   Respiratory:  Negative for shortness of breath.    Cardiovascular:  Negative for chest pain.   Gastrointestinal:  Negative for abdominal pain and nausea.   Genitourinary:  Negative for dysuria.   Musculoskeletal:  Positive for arthralgias.   Skin:  Negative for rash and wound.   Neurological:  Negative for weakness and headaches.       Objective:  Vitals:  There were no vitals taken for this visit.    The following portions of the " patient's history were reviewed and updated as appropriate: allergies, current medications, past family history, past medical history, past social history, past surgical history, and problem list.    Physical exam:  Physical Exam  Ortho Exam    I have personally reviewed pertinent films in PACS and my interpretation is XR  B knee- medial joint space narrowing/mod/sev OA..    Procedures    Jose Barroso MD

## 2024-06-20 NOTE — PATIENT INSTRUCTIONS
F/u as needed  R knee steroid injection given  Icing/heat/OTC pain meds as needed.  Home exercises.

## 2024-06-20 NOTE — PROGRESS NOTES
"[facility] [address] [address] [phone]      Assessment:  1. Primary osteoarthritis of one knee, right  2. Primary osteoarthritis of both knees  -     Ambulatory Referral to Orthopedic Surgery      Plan:  Patient Instructions   F/u as needed  R knee steroid injection given  Icing/heat/OTC pain meds as needed.  Home exercises.   Return if symptoms worsen or fail to improve.    Chief Complaint:  Chief Complaint   Patient presents with    Left Knee - Pain    Right Knee - Pain       Subjective:   HPI    Patient ID: Meghna Granda is a 89 y.o. female     Here c/o B knees pain  Pain for years  No recent fall/injury  No pain walking  Worse at night when laying down.  No pain meds  Worse stepping on R leg- hear crunches  Had injections years ago- helped    Review of Systems   Constitutional:  Negative for fatigue and fever.   Respiratory:  Negative for shortness of breath.    Cardiovascular:  Negative for chest pain.   Gastrointestinal:  Negative for abdominal pain and nausea.   Genitourinary:  Negative for dysuria.   Musculoskeletal:  Positive for arthralgias.   Skin:  Negative for rash and wound.   Neurological:  Negative for weakness and headaches.       Objective:  Vitals:  /77 (BP Location: Right arm, Patient Position: Sitting, Cuff Size: Standard)   Pulse 66   Temp 98.4 °F (36.9 °C) (Tympanic)   Ht 4' 11\" (1.499 m)   Wt 58.1 kg (128 lb)   BMI 25.85 kg/m²     The following portions of the patient's history were reviewed and updated as appropriate: allergies, current medications, past family history, past medical history, past social history, past surgical history, and problem list.    Physical exam:  Physical Exam  Constitutional:       Appearance: Normal appearance. She is normal weight.   HENT:      Head: Normocephalic.   Eyes:      Extraocular Movements: Extraocular movements intact.   Pulmonary:      Effort: " "Pulmonary effort is normal.   Musculoskeletal:      Cervical back: Normal range of motion.      Right knee: No effusion.      Instability Tests: Medial Osman test positive. Lateral Osman test negative.      Left knee: No effusion.      Instability Tests: Medial Osman test negative and lateral Osman test negative.   Skin:     General: Skin is warm and dry.   Neurological:      General: No focal deficit present.      Mental Status: She is alert and oriented to person, place, and time. Mental status is at baseline.   Psychiatric:         Mood and Affect: Mood normal.         Behavior: Behavior normal.         Thought Content: Thought content normal.         Judgment: Judgment normal.       Right Knee Exam     Tenderness   The patient is experiencing tenderness in the medial joint line.    Range of Motion   The patient has normal right knee ROM.    Tests   Osman:  Medial - positive Lateral - negative  Varus: negative Valgus: negative    Other   Swelling: none  Effusion: no effusion present      Left Knee Exam     Tenderness   The patient is experiencing no tenderness.     Range of Motion   The patient has normal left knee ROM.    Tests   Osman:  Medial - negative Lateral - negative  Varus: negative Valgus: negative    Other   Swelling: none  Effusion: no effusion present          Observations   Left Knee   Negative for effusion.     Right Knee   Negative for effusion.       I have personally reviewed pertinent films in PACS and my interpretation is XR  B knee- medial joint space narrowing/mod/sev OA..    Large joint arthrocentesis: R knee  Universal Protocol:  Consent: Verbal consent obtained.  Risks and benefits: risks, benefits and alternatives were discussed  Consent given by: patient  Time out: Immediately prior to procedure a \"time out\" was called to verify the correct patient, procedure, equipment, support staff and site/side marked as required.  Timeout called at: 6/20/2024 1:22 PM.  Site " marked: the operative site was marked  Supporting Documentation  Indications: pain   Procedure Details  Location: knee - R knee  Preparation: Patient was prepped and draped in the usual sterile fashion  Needle size: 25 G  Ultrasound guidance: no  Approach: anterolateral  Medications administered: 40 mg triamcinolone acetonide 40 mg/mL; 10 mL ropivacaine 0.5 %    Patient tolerance: patient tolerated the procedure well with no immediate complications  Dressing:  Sterile dressing applied          Jose Barroso MD

## 2024-06-26 ENCOUNTER — OFFICE VISIT (OUTPATIENT)
Dept: FAMILY MEDICINE CLINIC | Facility: CLINIC | Age: 89
End: 2024-06-26
Payer: COMMERCIAL

## 2024-06-26 VITALS
DIASTOLIC BLOOD PRESSURE: 68 MMHG | HEART RATE: 81 BPM | BODY MASS INDEX: 26 KG/M2 | SYSTOLIC BLOOD PRESSURE: 126 MMHG | HEIGHT: 59 IN | OXYGEN SATURATION: 97 % | TEMPERATURE: 98.7 F | WEIGHT: 129 LBS

## 2024-06-26 DIAGNOSIS — R26.9 GAIT DISTURBANCE: ICD-10-CM

## 2024-06-26 DIAGNOSIS — M17.0 PRIMARY OSTEOARTHRITIS OF BOTH KNEES: ICD-10-CM

## 2024-06-26 DIAGNOSIS — R53.1 GENERALIZED WEAKNESS: Primary | ICD-10-CM

## 2024-06-26 DIAGNOSIS — R07.9 CHEST PAIN, UNSPECIFIED TYPE: ICD-10-CM

## 2024-06-26 DIAGNOSIS — M54.30 SCIATIC NERVE PAIN, UNSPECIFIED LATERALITY: ICD-10-CM

## 2024-06-26 PROCEDURE — 93000 ELECTROCARDIOGRAM COMPLETE: CPT | Performed by: NURSE PRACTITIONER

## 2024-06-26 PROCEDURE — 99214 OFFICE O/P EST MOD 30 MIN: CPT | Performed by: NURSE PRACTITIONER

## 2024-06-26 NOTE — PROGRESS NOTES
Ambulatory Visit  Name: Meghna Granda      : 8/15/1933      MRN: [id number]  Encounter Provider: SHILA Cee  Encounter Date: 2024   Encounter department: [facility]    Assessment & Plan   1. Generalized weakness  -     EXTERNAL Referral to Home Health; Future  2. Primary osteoarthritis of both knees  3. Gait disturbance  -     EXTERNAL Referral to Home Health; Future  4. Sciatic nerve pain, unspecified laterality  -     EXTERNAL Referral to Home Health; Future  5. Chest pain, unspecified type  -     POCT ECG       History of Present Illness   {Disappearing Hyperlinks I Encounters * My Last Note * Since Last Visit * History :82823}  Presents for follow-up.  Patient states that she is feeling better over the last month since she had a UTI.  However, she does still have some residual fatigue and weakness.  She states she is feeling stronger as before she was unable to walk without holding onto furniture but now she is walking better on her own.  She still does not feel strong enough to be able to drive anywhere and requires assistance to leave the house.  Her endurance is limited.  She did have 1 week of diarrhea from 6 9- 15.  It did resolve on its own she also had a subjective fever during this period of time.  She does not have any any symptoms anymore.  She does intermittently struggle with constipation which has been a long-term issue.  When she takes a fiber supplement like she has been instructed it does help so she will continue with this.  Constipation prevention reviewed.  Patient states after taking the minocycline her urinary burning improved.  However, she states when she takes antibiotics she gets a visual disturbances which resolved when she is finished.  This has happened in the past as well with Cipro and Macrobid.  Patient does admit that she had 1 episode of chest pain in the last month.  States it was a pressure on her chest and it made her very  "nervous when it happened.  EKG was normal sinus rhythm no changes from previous.  She has not had any other episodes of chest pain.  Recommend that she follow up in the ER if chest pain happens again.  Signs and symptoms of when to return reviewed.  She will start at home physical therapy to help with her endurance and gait instability.  Has noticed an improvement in her knee pain since having injections with orthopedics.  She does have chronic sciatic pain which she will also work on with physical therapy but states right now it is not bothering her too much.  Wants to learn exercises again to help prevent this.        Review of Systems   Constitutional:  Negative for appetite change, fatigue and unexpected weight change.   HENT:  Negative for congestion, rhinorrhea, sneezing and sore throat.    Eyes:  Negative for visual disturbance.   Respiratory:  Negative for cough, chest tightness, shortness of breath and wheezing.    Cardiovascular:  Negative for chest pain, palpitations and leg swelling.   Gastrointestinal:  Negative for abdominal pain, blood in stool, constipation, diarrhea, nausea and vomiting.   Genitourinary:  Negative for difficulty urinating, dysuria and urgency.   Musculoskeletal:  Positive for arthralgias and back pain. Negative for joint swelling.   Skin:  Negative for color change and rash.   Neurological:  Positive for weakness. Negative for dizziness and headaches.   Hematological:  Negative for adenopathy. Does not bruise/bleed easily.       Objective   {Disappearing Hyperlinks   Review Vitals * Enter New Vitals * Results Review * Labs * Imaging * Cardiology * Procedures * Lung Cancer Screening :94779}  /68   Pulse 81   Temp 98.7 °F (37.1 °C)   Ht 4' 11\" (1.499 m)   Wt 58.5 kg (129 lb)   SpO2 97%   BMI 26.05 kg/m²     Physical Exam  Vitals and nursing note reviewed.   Constitutional:       General: She is not in acute distress.     Appearance: She is well-developed.   HENT:      " Head: Normocephalic and atraumatic.   Cardiovascular:      Rate and Rhythm: Normal rate and regular rhythm.      Heart sounds: No murmur heard.  Pulmonary:      Effort: Pulmonary effort is normal. No respiratory distress.      Breath sounds: Normal breath sounds.   Abdominal:      General: Abdomen is flat. Bowel sounds are normal.      Palpations: Abdomen is soft.      Tenderness: There is no abdominal tenderness.   Musculoskeletal:         General: No swelling.      Cervical back: Neck supple.      Right lower leg: No edema.      Left lower leg: No edema.   Skin:     General: Skin is warm and dry.      Capillary Refill: Capillary refill takes less than 2 seconds.   Neurological:      Mental Status: She is alert.   Psychiatric:         Mood and Affect: Mood normal.       Administrative Statements {Disappearing Hyperlinks I  Level of Service * [id number]}

## 2024-06-28 ENCOUNTER — TELEPHONE (OUTPATIENT)
Age: 89
End: 2024-06-28

## 2024-06-28 NOTE — TELEPHONE ENCOUNTER
Pt daughter called, asked if referral could be faxed to [facility] that was placed for her mother. Also would like a picture of her insurance card faxed to them as well. Fax #[phone]. Please advise and fax over at your convenience.

## 2024-06-28 NOTE — TELEPHONE ENCOUNTER
Form was faxed this morning. Patients daughter made aware.     The form will also be getting scanned into the patients chart.

## 2024-07-21 DIAGNOSIS — E03.9 ACQUIRED HYPOTHYROIDISM: ICD-10-CM

## 2024-07-21 RX ORDER — LEVOTHYROXINE SODIUM 0.07 MG/1
75 TABLET ORAL DAILY
Qty: 90 TABLET | Refills: 1 | Status: SHIPPED | OUTPATIENT
Start: 2024-07-21

## 2024-07-25 ENCOUNTER — OFFICE VISIT (OUTPATIENT)
Dept: CARDIOLOGY CLINIC | Facility: CLINIC | Age: 89
End: 2024-07-25
Payer: COMMERCIAL

## 2024-07-25 VITALS
HEIGHT: 59 IN | OXYGEN SATURATION: 97 % | DIASTOLIC BLOOD PRESSURE: 80 MMHG | HEART RATE: 75 BPM | SYSTOLIC BLOOD PRESSURE: 172 MMHG | WEIGHT: 132.3 LBS | BODY MASS INDEX: 26.67 KG/M2

## 2024-07-25 DIAGNOSIS — I10 ESSENTIAL HYPERTENSION: ICD-10-CM

## 2024-07-25 DIAGNOSIS — E78.00 HYPERCHOLESTEROLEMIA: ICD-10-CM

## 2024-07-25 DIAGNOSIS — R07.9 CHEST PAIN, UNSPECIFIED TYPE: ICD-10-CM

## 2024-07-25 DIAGNOSIS — R06.09 DYSPNEA ON EXERTION: ICD-10-CM

## 2024-07-25 DIAGNOSIS — I25.10 CORONARY ARTERY DISEASE INVOLVING NATIVE CORONARY ARTERY OF NATIVE HEART WITHOUT ANGINA PECTORIS: Primary | ICD-10-CM

## 2024-07-25 DIAGNOSIS — Z98.890 HISTORY OF RADIOFREQUENCY ABLATION (RFA) PROCEDURE FOR CARDIAC ARRHYTHMIA: ICD-10-CM

## 2024-07-25 PROCEDURE — 99214 OFFICE O/P EST MOD 30 MIN: CPT | Performed by: INTERNAL MEDICINE

## 2024-07-25 PROCEDURE — 93000 ELECTROCARDIOGRAM COMPLETE: CPT | Performed by: INTERNAL MEDICINE

## 2024-07-25 RX ORDER — ISOSORBIDE MONONITRATE 30 MG/1
30 TABLET, EXTENDED RELEASE ORAL DAILY
Qty: 30 TABLET | Refills: 3 | Status: SHIPPED | OUTPATIENT
Start: 2024-07-25

## 2024-07-25 NOTE — H&P (VIEW-ONLY)
Cardiology Follow Up    Meghna Granda  8/15/1933  [id number]  [facility] CARDIAC CATH LAB  [address] [facility] [address]  [phone]    1. Coronary artery disease involving native coronary artery of native heart without angina pectoris  isosorbide mononitrate (IMDUR) 30 mg 24 hr tablet    Echo complete w/ contrast if indicated      2. Essential hypertension  Echo complete w/ contrast if indicated      3. History of radiofrequency ablation (RFA) procedure for cardiac arrhythmia        4. Hypercholesterolemia        5. Dyspnea on exertion  Echo complete w/ contrast if indicated          Interval History: Cardiology follow-up.  None scheduled visit.  Patient counseled embolism.  Patient states that she is from her knees and started physical therapy.  In the process, she thinks he overdid it the first time that she is being seen midsternal chest tightness, she is uncertain as to how long the event lasted, resolved after she laid down.  She did not seek medical attention but she was close called emergency room.  She has been feeling fatigue and tired since that time.  She does have similar symptoms with mild to moderate effort.  She denies orthopnea or PND, there was associated dyspnea with the symptoms.  States compliant with aspirin therapy, no bleeding issues, she has been compliant intolerant to multiple medications in the past.  Blood pressure is elevated today at 172/80, heart rate 75.  Denies any palpitations, no syncope or presyncope.    Patient Active Problem List   Diagnosis    Coronary artery disease involving native coronary artery of native heart without angina pectoris    Hypercholesterolemia    Negative depression screening    History of radiofrequency ablation (RFA) procedure for cardiac arrhythmia    Overweight (BMI 25.0-29.9)    Acquired hypothyroidism    Age related osteoporosis    Hyponatremia    Essential hypertension     Dyspnea on exertion    Stage 3a chronic kidney disease (HCC)    Increase in creatinine     Past Medical History:   Diagnosis Date    Cardiac arrhythmia     Coronary artery disease     Disease of thyroid gland     Esophageal reflux     GERD (gastroesophageal reflux disease)     Hypertension     Hypothyroidism     Shingles 11-    Supraventricular tachycardia 7/27/2016    Varicella-zoster infection      Social History     Socioeconomic History    Marital status:      Spouse name: Not on file    Number of children: 3    Years of education: Not on file    Highest education level: Not on file   Occupational History    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   Tobacco Use    Smoking status: Never    Smokeless tobacco: Never   Vaping Use    Vaping status: Never Used   Substance and Sexual Activity    Alcohol use: No    Drug use: No    Sexual activity: Not Currently   Other Topics Concern    Not on file   Social History Narrative    No caffeine use     Social Determinants of Health     Financial Resource Strain: Low Risk  (11/1/2023)    Overall Financial Resource Strain (CARDIA)     Difficulty of Paying Living Expenses: Not hard at all   Food Insecurity: Not on file   Transportation Needs: No Transportation Needs (11/1/2023)    PRAPARE - Transportation     Lack of Transportation (Medical): No     Lack of Transportation (Non-Medical): No   Physical Activity: Not on file   Stress: Not on file   Social Connections: Not on file   Intimate Partner Violence: Not on file   Housing Stability: Not on file      Family History   Problem Relation Age of Onset    Heart disease Mother     Heart disease Father     COPD Father      Past Surgical History:   Procedure Laterality Date    CHOLECYSTECTOMY      COLON SURGERY      CORONARY ANGIOPLASTY      HYSTERECTOMY  1968    PARTIAL HYSTERECTOMY      SMALL INTESTINE SURGERY      Partial    TONSILLECTOMY  age 19       Current Outpatient Medications:     aspirin 81  MG tablet, Take 81 mg by mouth every other day , Disp: , Rfl:     isosorbide mononitrate (IMDUR) 30 mg 24 hr tablet, Take 1 tablet (30 mg total) by mouth daily, Disp: 30 tablet, Rfl: 3    ketotifen (ZADITOR) 0.025 % ophthalmic solution, Administer 1 drop to both eyes Three to four times a day, Disp: , Rfl:     levothyroxine 75 mcg tablet, Take 1 tablet (75 mcg total) by mouth daily, Disp: 90 tablet, Rfl: 1    metoprolol succinate (TOPROL-XL) 25 mg 24 hr tablet, Take 1/2 (one-half) tablet by mouth once daily, Disp: 45 tablet, Rfl: 3    Misc Natural Products (Grape Seed Complex) CAPS, Take by mouth One a day, Disp: , Rfl:     Tart Cherry 1200 MG CAPS, Take by mouth 500mg   2 daily, Disp: , Rfl:     PREVIDENT 5000 PLUS 1.1 % CREA, daily (Patient not taking: Reported on 6/26/2024), Disp: , Rfl: 0  Allergies   Allergen Reactions    Bextra [Valdecoxib]     Codeine Nausea Only, Headache and Tremor    Demerol [Meperidine] Nausea Only and Headache    Diovan [Valsartan]     Enalapril Cough    Lisinopril Cough    Penicillins     Percocet [Oxycodone-Acetaminophen]     Sulfa Antibiotics Abdominal Pain    Tramadol Nausea Only and Headache    Vicodin [Hydrocodone-Acetaminophen]     Zithromax [Azithromycin] Diarrhea    Zocor [Simvastatin] Hives       Labs:  Appointment on 05/23/2024   Component Date Value    WBC 05/23/2024 4.03 (L)     RBC 05/23/2024 4.31     Hemoglobin 05/23/2024 13.3     Hematocrit 05/23/2024 41.4     MCV 05/23/2024 96     MCH 05/23/2024 30.9     MCHC 05/23/2024 32.1     RDW 05/23/2024 13.2     MPV 05/23/2024 9.2     Platelets 05/23/2024 334     nRBC 05/23/2024 0     Segmented % 05/23/2024 49     Immature Grans % 05/23/2024 0     Lymphocytes % 05/23/2024 38     Monocytes % 05/23/2024 10     Eosinophils Relative 05/23/2024 2     Basophils Relative 05/23/2024 1     Absolute Neutrophils 05/23/2024 1.97     Absolute Immature Grans 05/23/2024 0.01     Absolute Lymphocytes 05/23/2024 1.53     Absolute Monocytes  05/23/2024 0.41     Eosinophils Absolute 05/23/2024 0.07     Basophils Absolute 05/23/2024 0.04     Sodium 05/23/2024 135     Potassium 05/23/2024 4.0     Chloride 05/23/2024 101     CO2 05/23/2024 23     ANION GAP 05/23/2024 11     BUN 05/23/2024 20     Creatinine 05/23/2024 1.02     Glucose, Fasting 05/23/2024 95     Calcium 05/23/2024 8.9     AST 05/23/2024 20     ALT 05/23/2024 13     Alkaline Phosphatase 05/23/2024 92     Total Protein 05/23/2024 7.1     Albumin 05/23/2024 4.1     Total Bilirubin 05/23/2024 0.85     eGFR 05/23/2024 48     TSH 3RD GENERATON 05/23/2024 2.372     Color, UA 05/24/2024 Light Yellow     Clarity, UA 05/24/2024 Turbid     Specific Gravity, UA 05/24/2024 1.008     pH, UA 05/24/2024 6.5     Leukocytes, UA 05/24/2024 Large (A)     Nitrite, UA 05/24/2024 Positive (A)     Protein, UA 05/24/2024 Negative     Glucose, UA 05/24/2024 Negative     Ketones, UA 05/24/2024 Negative     Urobilinogen, UA 05/24/2024 <2.0     Bilirubin, UA 05/24/2024 Negative     Occult Blood, UA 05/24/2024 Negative     RBC, UA 05/24/2024 2-4 (A)     WBC, UA 05/24/2024 Innumerable (A)     Epithelial Cells 05/24/2024 None Seen     Bacteria, UA 05/24/2024 Occasional     Urine Culture 05/24/2024 >100,000 cfu/ml Escherichia coli (A)    Appointment on 04/16/2024   Component Date Value    Sodium 04/16/2024 136     Potassium 04/16/2024 4.3     Chloride 04/16/2024 100     CO2 04/16/2024 27     ANION GAP 04/16/2024 9     BUN 04/16/2024 18     Creatinine 04/16/2024 0.98     Glucose 04/16/2024 86     Calcium 04/16/2024 8.7     eGFR 04/16/2024 50    Appointment on 03/13/2024   Component Date Value    Creatinine, Ur 03/13/2024 76.3     Albumin,U,Random 03/13/2024 14.3     Albumin Creat Ratio 03/13/2024 19     Color, UA 03/13/2024 Light Yellow     Clarity, UA 03/13/2024 Clear     Specific Gravity, UA 03/13/2024 1.013     pH, UA 03/13/2024 6.0     Leukocytes, UA 03/13/2024 Moderate (A)     Nitrite, UA 03/13/2024 Positive (A)      Protein, UA 03/13/2024 Negative     Glucose, UA 03/13/2024 Negative     Ketones, UA 03/13/2024 Negative     Urobilinogen, UA 03/13/2024 <2.0     Bilirubin, UA 03/13/2024 Negative     Occult Blood, UA 03/13/2024 Negative     RBC, UA 03/13/2024 1-2     WBC, UA 03/13/2024 10-20 (A)     Epithelial Cells 03/13/2024 Occasional     Bacteria, UA 03/13/2024 Moderate (A)     Sodium 03/13/2024 136     Potassium 03/13/2024 4.2     Chloride 03/13/2024 101     CO2 03/13/2024 25     ANION GAP 03/13/2024 10     BUN 03/13/2024 29 (H)     Creatinine 03/13/2024 1.26     Glucose, Fasting 03/13/2024 95     Calcium 03/13/2024 9.0     AST 03/13/2024 21     ALT 03/13/2024 13     Alkaline Phosphatase 03/13/2024 87     Total Protein 03/13/2024 7.1     Albumin 03/13/2024 4.1     Total Bilirubin 03/13/2024 0.96     eGFR 03/13/2024 37     WBC 03/13/2024 5.10     RBC 03/13/2024 4.41     Hemoglobin 03/13/2024 13.5     Hematocrit 03/13/2024 42.9     MCV 03/13/2024 97     MCH 03/13/2024 30.6     MCHC 03/13/2024 31.5     RDW 03/13/2024 13.9     MPV 03/13/2024 9.1     Platelets 03/13/2024 333     nRBC 03/13/2024 0     Segmented % 03/13/2024 37 (L)     Immature Grans % 03/13/2024 0     Lymphocytes % 03/13/2024 48 (H)     Monocytes % 03/13/2024 10     Eosinophils Relative 03/13/2024 4     Basophils Relative 03/13/2024 1     Absolute Neutrophils 03/13/2024 1.86     Absolute Immature Grans 03/13/2024 0.00     Absolute Lymphocytes 03/13/2024 2.46     Absolute Monocytes 03/13/2024 0.53     Eosinophils Absolute 03/13/2024 0.18     Basophils Absolute 03/13/2024 0.07     Magnesium 03/13/2024 2.1     PTH 03/13/2024 55.9      Imaging: No results found.    Review of Systems:  Review of Systems   Constitutional:  Positive for activity change and fatigue. Negative for diaphoresis and fever.   HENT:  Positive for hearing loss. Negative for nosebleeds.    Eyes:  Negative for visual disturbance.   Respiratory:  Positive for shortness of breath. Negative for apnea,  wheezing and stridor.    Cardiovascular:  Positive for chest pain and leg swelling. Negative for palpitations.   Endocrine: Negative for cold intolerance.   Genitourinary:  Negative for frequency.   Musculoskeletal:  Positive for arthralgias and gait problem.   Skin:  Negative for pallor and rash.   Allergic/Immunologic: Negative for immunocompromised state.   Neurological:  Positive for dizziness. Negative for syncope and weakness.   Hematological:  Does not bruise/bleed easily.   Psychiatric/Behavioral:  Negative for sleep disturbance. The patient is nervous/anxious.        Physical Exam:  Physical Exam  Vitals reviewed.   Constitutional:       General: She is not in acute distress.     Appearance: Normal appearance. She is normal weight. She is not ill-appearing, toxic-appearing or diaphoretic.      Comments: Appears younger than stated age   Eyes:      General: No scleral icterus.  Neck:      Vascular: No carotid bruit.   Cardiovascular:      Rate and Rhythm: Normal rate and regular rhythm.      Pulses: Normal pulses.      Heart sounds: Normal heart sounds. No murmur heard.     No friction rub. No gallop.   Pulmonary:      Effort: Pulmonary effort is normal. No respiratory distress.      Breath sounds: Normal breath sounds. No stridor. No wheezing, rhonchi or rales.   Musculoskeletal:      Right lower leg: Edema present.      Left lower leg: Edema present.   Skin:     General: Skin is warm and dry.      Capillary Refill: Capillary refill takes less than 2 seconds.      Coloration: Skin is not jaundiced or pale.      Findings: No bruising or erythema.   Neurological:      Mental Status: She is alert and oriented to person, place, and time.   Psychiatric:         Mood and Affect: Mood normal.         Discussion/Summary:  Coronary artery disease, PTCA stent of the LAD in 2008. Last stress test 2018, she did 4 minutes of Doug protocol, achieving target heart rate, there were no EKG criteria for ischemia symptoms,  dyspnea appears to be angina equivalent .   longstanding untreated dyslipidemia because of intolerance and unwillingness to take statin therapy.  Previously, she appeared to be more willing and will try low-dose rosuvastatin every other day followed by every day in 2 weeks.  Even at that regimen she could not tolerate it.  Symptoms are consistent or possibly consistent with angina equivalent, we previously did order a pharmacological stress test as she will not be able to walk on a treadmill.  She did not proceed with testing.  echocardiogram.  2022 revealed normal left ventricular systolic function with stage I diastolic dysfunction interestingly so, there was mild left atrial enlargement and mild aortic insufficiency, mild mitral and tricuspid insufficiency with estimated normal pulmonary pressures suggested by the criteria, aortic root was described as mildly dilated measuring 37 mm which I think is an overcall.   Angina new onset, unstable angina.  No prolonged episodes.  Options including invasive versus noninvasive evaluation.  She is reluctant to do a stress test that she is having bad experience in the last pharmacological stress test.  Invasive cardiac catheterization is an option even despite her age, will discuss pros and cons, we decided with empiric therapy with nitrates, and assess response.  Low threshold catheterization, asked her to seek immediate medical attention if she does have a long episode of chest discomfort.  Today's EKG is nonischemic.  Supraventricular tachycardia, AVNRT, status post RFA ablation of the slow pathway.  In 2016, no clinical recurrences.  Last creatinine 1.2, GFR in the 40s.         This note was completed in part utilizing [employer] direct voice recognition software.   Grammatical errors, random word insertion, spelling mistakes, and incomplete sentences may be an occasional consequence of the system secondary to software limitations, ambient noise and hardware  issues. At the time of dictation, efforts were made to edit, clarify and /or correct errors.  Please read the chart carefully and recognize, using context, where substitutions have occurred.  If you have any questions or concerns about the context, text or information contained within the body of this dictation, please contact myself, the provider, for further clarification.

## 2024-07-25 NOTE — PROGRESS NOTES
Cardiology Follow Up    Meghna Granda  8/15/1933  [id number]  [facility] CARDIAC CATH LAB  [address] [facility] [address]  [phone]    1. Coronary artery disease involving native coronary artery of native heart without angina pectoris  isosorbide mononitrate (IMDUR) 30 mg 24 hr tablet    Echo complete w/ contrast if indicated      2. Essential hypertension  Echo complete w/ contrast if indicated      3. History of radiofrequency ablation (RFA) procedure for cardiac arrhythmia        4. Hypercholesterolemia        5. Dyspnea on exertion  Echo complete w/ contrast if indicated          Interval History: Cardiology follow-up.  None scheduled visit.  Patient counseled embolism.  Patient states that she is from her knees and started physical therapy.  In the process, she thinks he overdid it the first time that she is being seen midsternal chest tightness, she is uncertain as to how long the event lasted, resolved after she laid down.  She did not seek medical attention but she was close called emergency room.  She has been feeling fatigue and tired since that time.  She does have similar symptoms with mild to moderate effort.  She denies orthopnea or PND, there was associated dyspnea with the symptoms.  States compliant with aspirin therapy, no bleeding issues, she has been compliant intolerant to multiple medications in the past.  Blood pressure is elevated today at 172/80, heart rate 75.  Denies any palpitations, no syncope or presyncope.    Patient Active Problem List   Diagnosis    Coronary artery disease involving native coronary artery of native heart without angina pectoris    Hypercholesterolemia    Negative depression screening    History of radiofrequency ablation (RFA) procedure for cardiac arrhythmia    Overweight (BMI 25.0-29.9)    Acquired hypothyroidism    Age related osteoporosis    Hyponatremia    Essential hypertension     Dyspnea on exertion    Stage 3a chronic kidney disease (HCC)    Increase in creatinine     Past Medical History:   Diagnosis Date    Cardiac arrhythmia     Coronary artery disease     Disease of thyroid gland     Esophageal reflux     GERD (gastroesophageal reflux disease)     Hypertension     Hypothyroidism     Shingles 11-    Supraventricular tachycardia 7/27/2016    Varicella-zoster infection      Social History     Socioeconomic History    Marital status:      Spouse name: Not on file    Number of children: 3    Years of education: Not on file    Highest education level: Not on file   Occupational History    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   Tobacco Use    Smoking status: Never    Smokeless tobacco: Never   Vaping Use    Vaping status: Never Used   Substance and Sexual Activity    Alcohol use: No    Drug use: No    Sexual activity: Not Currently   Other Topics Concern    Not on file   Social History Narrative    No caffeine use     Social Determinants of Health     Financial Resource Strain: Low Risk  (11/1/2023)    Overall Financial Resource Strain (CARDIA)     Difficulty of Paying Living Expenses: Not hard at all   Food Insecurity: Not on file   Transportation Needs: No Transportation Needs (11/1/2023)    PRAPARE - Transportation     Lack of Transportation (Medical): No     Lack of Transportation (Non-Medical): No   Physical Activity: Not on file   Stress: Not on file   Social Connections: Not on file   Intimate Partner Violence: Not on file   Housing Stability: Not on file      Family History   Problem Relation Age of Onset    Heart disease Mother     Heart disease Father     COPD Father      Past Surgical History:   Procedure Laterality Date    CHOLECYSTECTOMY      COLON SURGERY      CORONARY ANGIOPLASTY      HYSTERECTOMY  1968    PARTIAL HYSTERECTOMY      SMALL INTESTINE SURGERY      Partial    TONSILLECTOMY  age 19       Current Outpatient Medications:     aspirin 81  MG tablet, Take 81 mg by mouth every other day , Disp: , Rfl:     isosorbide mononitrate (IMDUR) 30 mg 24 hr tablet, Take 1 tablet (30 mg total) by mouth daily, Disp: 30 tablet, Rfl: 3    ketotifen (ZADITOR) 0.025 % ophthalmic solution, Administer 1 drop to both eyes Three to four times a day, Disp: , Rfl:     levothyroxine 75 mcg tablet, Take 1 tablet (75 mcg total) by mouth daily, Disp: 90 tablet, Rfl: 1    metoprolol succinate (TOPROL-XL) 25 mg 24 hr tablet, Take 1/2 (one-half) tablet by mouth once daily, Disp: 45 tablet, Rfl: 3    Misc Natural Products (Grape Seed Complex) CAPS, Take by mouth One a day, Disp: , Rfl:     Tart Cherry 1200 MG CAPS, Take by mouth 500mg   2 daily, Disp: , Rfl:     PREVIDENT 5000 PLUS 1.1 % CREA, daily (Patient not taking: Reported on 6/26/2024), Disp: , Rfl: 0  Allergies   Allergen Reactions    Bextra [Valdecoxib]     Codeine Nausea Only, Headache and Tremor    Demerol [Meperidine] Nausea Only and Headache    Diovan [Valsartan]     Enalapril Cough    Lisinopril Cough    Penicillins     Percocet [Oxycodone-Acetaminophen]     Sulfa Antibiotics Abdominal Pain    Tramadol Nausea Only and Headache    Vicodin [Hydrocodone-Acetaminophen]     Zithromax [Azithromycin] Diarrhea    Zocor [Simvastatin] Hives       Labs:  Appointment on 05/23/2024   Component Date Value    WBC 05/23/2024 4.03 (L)     RBC 05/23/2024 4.31     Hemoglobin 05/23/2024 13.3     Hematocrit 05/23/2024 41.4     MCV 05/23/2024 96     MCH 05/23/2024 30.9     MCHC 05/23/2024 32.1     RDW 05/23/2024 13.2     MPV 05/23/2024 9.2     Platelets 05/23/2024 334     nRBC 05/23/2024 0     Segmented % 05/23/2024 49     Immature Grans % 05/23/2024 0     Lymphocytes % 05/23/2024 38     Monocytes % 05/23/2024 10     Eosinophils Relative 05/23/2024 2     Basophils Relative 05/23/2024 1     Absolute Neutrophils 05/23/2024 1.97     Absolute Immature Grans 05/23/2024 0.01     Absolute Lymphocytes 05/23/2024 1.53     Absolute Monocytes  05/23/2024 0.41     Eosinophils Absolute 05/23/2024 0.07     Basophils Absolute 05/23/2024 0.04     Sodium 05/23/2024 135     Potassium 05/23/2024 4.0     Chloride 05/23/2024 101     CO2 05/23/2024 23     ANION GAP 05/23/2024 11     BUN 05/23/2024 20     Creatinine 05/23/2024 1.02     Glucose, Fasting 05/23/2024 95     Calcium 05/23/2024 8.9     AST 05/23/2024 20     ALT 05/23/2024 13     Alkaline Phosphatase 05/23/2024 92     Total Protein 05/23/2024 7.1     Albumin 05/23/2024 4.1     Total Bilirubin 05/23/2024 0.85     eGFR 05/23/2024 48     TSH 3RD GENERATON 05/23/2024 2.372     Color, UA 05/24/2024 Light Yellow     Clarity, UA 05/24/2024 Turbid     Specific Gravity, UA 05/24/2024 1.008     pH, UA 05/24/2024 6.5     Leukocytes, UA 05/24/2024 Large (A)     Nitrite, UA 05/24/2024 Positive (A)     Protein, UA 05/24/2024 Negative     Glucose, UA 05/24/2024 Negative     Ketones, UA 05/24/2024 Negative     Urobilinogen, UA 05/24/2024 <2.0     Bilirubin, UA 05/24/2024 Negative     Occult Blood, UA 05/24/2024 Negative     RBC, UA 05/24/2024 2-4 (A)     WBC, UA 05/24/2024 Innumerable (A)     Epithelial Cells 05/24/2024 None Seen     Bacteria, UA 05/24/2024 Occasional     Urine Culture 05/24/2024 >100,000 cfu/ml Escherichia coli (A)    Appointment on 04/16/2024   Component Date Value    Sodium 04/16/2024 136     Potassium 04/16/2024 4.3     Chloride 04/16/2024 100     CO2 04/16/2024 27     ANION GAP 04/16/2024 9     BUN 04/16/2024 18     Creatinine 04/16/2024 0.98     Glucose 04/16/2024 86     Calcium 04/16/2024 8.7     eGFR 04/16/2024 50    Appointment on 03/13/2024   Component Date Value    Creatinine, Ur 03/13/2024 76.3     Albumin,U,Random 03/13/2024 14.3     Albumin Creat Ratio 03/13/2024 19     Color, UA 03/13/2024 Light Yellow     Clarity, UA 03/13/2024 Clear     Specific Gravity, UA 03/13/2024 1.013     pH, UA 03/13/2024 6.0     Leukocytes, UA 03/13/2024 Moderate (A)     Nitrite, UA 03/13/2024 Positive (A)      Protein, UA 03/13/2024 Negative     Glucose, UA 03/13/2024 Negative     Ketones, UA 03/13/2024 Negative     Urobilinogen, UA 03/13/2024 <2.0     Bilirubin, UA 03/13/2024 Negative     Occult Blood, UA 03/13/2024 Negative     RBC, UA 03/13/2024 1-2     WBC, UA 03/13/2024 10-20 (A)     Epithelial Cells 03/13/2024 Occasional     Bacteria, UA 03/13/2024 Moderate (A)     Sodium 03/13/2024 136     Potassium 03/13/2024 4.2     Chloride 03/13/2024 101     CO2 03/13/2024 25     ANION GAP 03/13/2024 10     BUN 03/13/2024 29 (H)     Creatinine 03/13/2024 1.26     Glucose, Fasting 03/13/2024 95     Calcium 03/13/2024 9.0     AST 03/13/2024 21     ALT 03/13/2024 13     Alkaline Phosphatase 03/13/2024 87     Total Protein 03/13/2024 7.1     Albumin 03/13/2024 4.1     Total Bilirubin 03/13/2024 0.96     eGFR 03/13/2024 37     WBC 03/13/2024 5.10     RBC 03/13/2024 4.41     Hemoglobin 03/13/2024 13.5     Hematocrit 03/13/2024 42.9     MCV 03/13/2024 97     MCH 03/13/2024 30.6     MCHC 03/13/2024 31.5     RDW 03/13/2024 13.9     MPV 03/13/2024 9.1     Platelets 03/13/2024 333     nRBC 03/13/2024 0     Segmented % 03/13/2024 37 (L)     Immature Grans % 03/13/2024 0     Lymphocytes % 03/13/2024 48 (H)     Monocytes % 03/13/2024 10     Eosinophils Relative 03/13/2024 4     Basophils Relative 03/13/2024 1     Absolute Neutrophils 03/13/2024 1.86     Absolute Immature Grans 03/13/2024 0.00     Absolute Lymphocytes 03/13/2024 2.46     Absolute Monocytes 03/13/2024 0.53     Eosinophils Absolute 03/13/2024 0.18     Basophils Absolute 03/13/2024 0.07     Magnesium 03/13/2024 2.1     PTH 03/13/2024 55.9      Imaging: No results found.    Review of Systems:  Review of Systems   Constitutional:  Positive for activity change and fatigue. Negative for diaphoresis and fever.   HENT:  Positive for hearing loss. Negative for nosebleeds.    Eyes:  Negative for visual disturbance.   Respiratory:  Positive for shortness of breath. Negative for apnea,  wheezing and stridor.    Cardiovascular:  Positive for chest pain and leg swelling. Negative for palpitations.   Endocrine: Negative for cold intolerance.   Genitourinary:  Negative for frequency.   Musculoskeletal:  Positive for arthralgias and gait problem.   Skin:  Negative for pallor and rash.   Allergic/Immunologic: Negative for immunocompromised state.   Neurological:  Positive for dizziness. Negative for syncope and weakness.   Hematological:  Does not bruise/bleed easily.   Psychiatric/Behavioral:  Negative for sleep disturbance. The patient is nervous/anxious.        Physical Exam:  Physical Exam  Vitals reviewed.   Constitutional:       General: She is not in acute distress.     Appearance: Normal appearance. She is normal weight. She is not ill-appearing, toxic-appearing or diaphoretic.      Comments: Appears younger than stated age   Eyes:      General: No scleral icterus.  Neck:      Vascular: No carotid bruit.   Cardiovascular:      Rate and Rhythm: Normal rate and regular rhythm.      Pulses: Normal pulses.      Heart sounds: Normal heart sounds. No murmur heard.     No friction rub. No gallop.   Pulmonary:      Effort: Pulmonary effort is normal. No respiratory distress.      Breath sounds: Normal breath sounds. No stridor. No wheezing, rhonchi or rales.   Musculoskeletal:      Right lower leg: Edema present.      Left lower leg: Edema present.   Skin:     General: Skin is warm and dry.      Capillary Refill: Capillary refill takes less than 2 seconds.      Coloration: Skin is not jaundiced or pale.      Findings: No bruising or erythema.   Neurological:      Mental Status: She is alert and oriented to person, place, and time.   Psychiatric:         Mood and Affect: Mood normal.         Discussion/Summary:  Coronary artery disease, PTCA stent of the LAD in 2008. Last stress test 2018, she did 4 minutes of Doug protocol, achieving target heart rate, there were no EKG criteria for ischemia symptoms,  dyspnea appears to be angina equivalent .   longstanding untreated dyslipidemia because of intolerance and unwillingness to take statin therapy.  Previously, she appeared to be more willing and will try low-dose rosuvastatin every other day followed by every day in 2 weeks.  Even at that regimen she could not tolerate it.  Symptoms are consistent or possibly consistent with angina equivalent, we previously did order a pharmacological stress test as she will not be able to walk on a treadmill.  She did not proceed with testing.  echocardiogram.  2022 revealed normal left ventricular systolic function with stage I diastolic dysfunction interestingly so, there was mild left atrial enlargement and mild aortic insufficiency, mild mitral and tricuspid insufficiency with estimated normal pulmonary pressures suggested by the criteria, aortic root was described as mildly dilated measuring 37 mm which I think is an overcall.   Angina new onset, unstable angina.  No prolonged episodes.  Options including invasive versus noninvasive evaluation.  She is reluctant to do a stress test that she is having bad experience in the last pharmacological stress test.  Invasive cardiac catheterization is an option even despite her age, will discuss pros and cons, we decided with empiric therapy with nitrates, and assess response.  Low threshold catheterization, asked her to seek immediate medical attention if she does have a long episode of chest discomfort.  Today's EKG is nonischemic.  Supraventricular tachycardia, AVNRT, status post RFA ablation of the slow pathway.  In 2016, no clinical recurrences.  Last creatinine 1.2, GFR in the 40s.         This note was completed in part utilizing [employer] direct voice recognition software.   Grammatical errors, random word insertion, spelling mistakes, and incomplete sentences may be an occasional consequence of the system secondary to software limitations, ambient noise and hardware  issues. At the time of dictation, efforts were made to edit, clarify and /or correct errors.  Please read the chart carefully and recognize, using context, where substitutions have occurred.  If you have any questions or concerns about the context, text or information contained within the body of this dictation, please contact myself, the provider, for further clarification.

## 2024-07-29 ENCOUNTER — TELEPHONE (OUTPATIENT)
Age: 89
End: 2024-07-29

## 2024-07-29 DIAGNOSIS — I25.10 CORONARY ARTERY DISEASE INVOLVING NATIVE CORONARY ARTERY OF NATIVE HEART WITHOUT ANGINA PECTORIS: Primary | ICD-10-CM

## 2024-07-29 NOTE — TELEPHONE ENCOUNTER
Daughter Hina called stating her mother changed her mind and is willing to move forward with a cath.   She is starting to feel slightly feeling better on the Imdur, but still feels she needs to go ahead with the cath  Hina c/b# [phone]. If no answer, may lv a message.

## 2024-07-30 NOTE — TELEPHONE ENCOUNTER
"Patient scheduled for Left Heart Cath on 8/19/24 at [facility] with Dr. Engle.      Instructions sent to patient through [employer].      Patient's daughter Hina aware of all general instructions.    Medication holds:   N/A    Blood Thinners:   N/A    Labs to be done on 8/5/24:  CMP / CBC (fasting 8 hours)       : Please enter Prep for Procedure.     Thank you,  Carolina \"Paula\" Tyson      "

## 2024-08-05 ENCOUNTER — APPOINTMENT (OUTPATIENT)
Dept: LAB | Facility: CLINIC | Age: 89
End: 2024-08-05
Payer: COMMERCIAL

## 2024-08-05 DIAGNOSIS — N18.31 STAGE 3A CHRONIC KIDNEY DISEASE (HCC): ICD-10-CM

## 2024-08-05 LAB
25(OH)D3 SERPL-MCNC: 63.8 NG/ML (ref 30–100)
ALBUMIN SERPL BCG-MCNC: 3.7 G/DL (ref 3.5–5)
ALP SERPL-CCNC: 81 U/L (ref 34–104)
ALT SERPL W P-5'-P-CCNC: 11 U/L (ref 7–52)
ANION GAP SERPL CALCULATED.3IONS-SCNC: 10 MMOL/L (ref 4–13)
AST SERPL W P-5'-P-CCNC: 19 U/L (ref 13–39)
BACTERIA UR QL AUTO: ABNORMAL /HPF
BASOPHILS # BLD AUTO: 0.03 THOUSANDS/ÂΜL (ref 0–0.1)
BASOPHILS NFR BLD AUTO: 1 % (ref 0–1)
BILIRUB SERPL-MCNC: 0.82 MG/DL (ref 0.2–1)
BILIRUB UR QL STRIP: NEGATIVE
BUN SERPL-MCNC: 16 MG/DL (ref 5–25)
CALCIUM SERPL-MCNC: 9 MG/DL (ref 8.4–10.2)
CHLORIDE SERPL-SCNC: 96 MMOL/L (ref 96–108)
CLARITY UR: ABNORMAL
CO2 SERPL-SCNC: 25 MMOL/L (ref 21–32)
COLOR UR: ABNORMAL
CREAT SERPL-MCNC: 0.91 MG/DL (ref 0.6–1.3)
CREAT UR-MCNC: 65.5 MG/DL
EOSINOPHIL # BLD AUTO: 0.26 THOUSAND/ÂΜL (ref 0–0.61)
EOSINOPHIL NFR BLD AUTO: 6 % (ref 0–6)
ERYTHROCYTE [DISTWIDTH] IN BLOOD BY AUTOMATED COUNT: 13.7 % (ref 11.6–15.1)
GFR SERPL CREATININE-BSD FRML MDRD: 55 ML/MIN/1.73SQ M
GLUCOSE P FAST SERPL-MCNC: 89 MG/DL (ref 65–99)
GLUCOSE UR STRIP-MCNC: NEGATIVE MG/DL
HCT VFR BLD AUTO: 39.3 % (ref 34.8–46.1)
HGB BLD-MCNC: 12.9 G/DL (ref 11.5–15.4)
HGB UR QL STRIP.AUTO: NEGATIVE
IMM GRANULOCYTES # BLD AUTO: 0.01 THOUSAND/UL (ref 0–0.2)
IMM GRANULOCYTES NFR BLD AUTO: 0 % (ref 0–2)
KETONES UR STRIP-MCNC: NEGATIVE MG/DL
LEUKOCYTE ESTERASE UR QL STRIP: ABNORMAL
LYMPHOCYTES # BLD AUTO: 1.21 THOUSANDS/ÂΜL (ref 0.6–4.47)
LYMPHOCYTES NFR BLD AUTO: 29 % (ref 14–44)
MAGNESIUM SERPL-MCNC: 1.9 MG/DL (ref 1.9–2.7)
MCH RBC QN AUTO: 30.8 PG (ref 26.8–34.3)
MCHC RBC AUTO-ENTMCNC: 32.8 G/DL (ref 31.4–37.4)
MCV RBC AUTO: 94 FL (ref 82–98)
MICROALBUMIN UR-MCNC: 21.1 MG/L
MICROALBUMIN/CREAT 24H UR: 32 MG/G CREATININE (ref 0–30)
MONOCYTES # BLD AUTO: 0.48 THOUSAND/ÂΜL (ref 0.17–1.22)
MONOCYTES NFR BLD AUTO: 11 % (ref 4–12)
NEUTROPHILS # BLD AUTO: 2.26 THOUSANDS/ÂΜL (ref 1.85–7.62)
NEUTS SEG NFR BLD AUTO: 53 % (ref 43–75)
NITRITE UR QL STRIP: POSITIVE
NON-SQ EPI CELLS URNS QL MICRO: ABNORMAL /HPF
NRBC BLD AUTO-RTO: 0 /100 WBCS
PH UR STRIP.AUTO: 6.5 [PH]
PLATELET # BLD AUTO: 335 THOUSANDS/UL (ref 149–390)
PMV BLD AUTO: 8.8 FL (ref 8.9–12.7)
POTASSIUM SERPL-SCNC: 4.2 MMOL/L (ref 3.5–5.3)
PROT SERPL-MCNC: 6.8 G/DL (ref 6.4–8.4)
PROT UR STRIP-MCNC: NEGATIVE MG/DL
PTH-INTACT SERPL-MCNC: 22.5 PG/ML (ref 12–88)
RBC # BLD AUTO: 4.19 MILLION/UL (ref 3.81–5.12)
RBC #/AREA URNS AUTO: ABNORMAL /HPF
SODIUM SERPL-SCNC: 131 MMOL/L (ref 135–147)
SP GR UR STRIP.AUTO: 1.01 (ref 1–1.03)
UROBILINOGEN UR STRIP-ACNC: <2 MG/DL
WBC # BLD AUTO: 4.25 THOUSAND/UL (ref 4.31–10.16)
WBC #/AREA URNS AUTO: ABNORMAL /HPF

## 2024-08-05 PROCEDURE — 80053 COMPREHEN METABOLIC PANEL: CPT

## 2024-08-05 PROCEDURE — 36415 COLL VENOUS BLD VENIPUNCTURE: CPT

## 2024-08-05 PROCEDURE — 81001 URINALYSIS AUTO W/SCOPE: CPT

## 2024-08-05 PROCEDURE — 82306 VITAMIN D 25 HYDROXY: CPT

## 2024-08-05 PROCEDURE — 82570 ASSAY OF URINE CREATININE: CPT

## 2024-08-05 PROCEDURE — 82043 UR ALBUMIN QUANTITATIVE: CPT

## 2024-08-05 PROCEDURE — 85025 COMPLETE CBC W/AUTO DIFF WBC: CPT

## 2024-08-05 PROCEDURE — 83735 ASSAY OF MAGNESIUM: CPT

## 2024-08-05 PROCEDURE — 83970 ASSAY OF PARATHORMONE: CPT

## 2024-08-19 ENCOUNTER — HOSPITAL ENCOUNTER (OUTPATIENT)
Facility: HOSPITAL | Age: 89
Setting detail: OUTPATIENT SURGERY
Discharge: HOME | End: 2024-08-20
Attending: INTERNAL MEDICINE | Admitting: INTERNAL MEDICINE
Payer: COMMERCIAL

## 2024-08-19 DIAGNOSIS — I25.10 CORONARY ARTERY DISEASE INVOLVING NATIVE CORONARY ARTERY OF NATIVE HEART WITHOUT ANGINA PECTORIS: ICD-10-CM

## 2024-08-19 DIAGNOSIS — I20.0 UNSTABLE ANGINA (HCC): ICD-10-CM

## 2024-08-19 LAB
ANION GAP SERPL CALCULATED.3IONS-SCNC: 8 MMOL/L (ref 4–13)
ATRIAL RATE: 62 BPM
BUN SERPL-MCNC: 27 MG/DL (ref 5–25)
CALCIUM SERPL-MCNC: 9.3 MG/DL (ref 8.4–10.2)
CHLORIDE SERPL-SCNC: 103 MMOL/L (ref 96–108)
CO2 SERPL-SCNC: 24 MMOL/L (ref 21–32)
CREAT SERPL-MCNC: 1.09 MG/DL (ref 0.6–1.3)
GFR SERPL CREATININE-BSD FRML MDRD: 44 ML/MIN/1.73SQ M
GLUCOSE P FAST SERPL-MCNC: 102 MG/DL (ref 65–99)
GLUCOSE SERPL-MCNC: 102 MG/DL (ref 65–140)
KCT BLD-ACNC: 204 SEC (ref 89–137)
P AXIS: 32 DEGREES
POTASSIUM SERPL-SCNC: 3.9 MMOL/L (ref 3.5–5.3)
PR INTERVAL: 134 MS
QRS AXIS: 0 DEGREES
QRSD INTERVAL: 92 MS
QT INTERVAL: 414 MS
QTC INTERVAL: 420 MS
SODIUM SERPL-SCNC: 135 MMOL/L (ref 135–147)
SPECIMEN SOURCE: ABNORMAL
T WAVE AXIS: 55 DEGREES
VENTRICULAR RATE: 62 BPM

## 2024-08-19 PROCEDURE — 93005 ELECTROCARDIOGRAM TRACING: CPT

## 2024-08-19 PROCEDURE — C1894 INTRO/SHEATH, NON-LASER: HCPCS | Performed by: INTERNAL MEDICINE

## 2024-08-19 PROCEDURE — C1769 GUIDE WIRE: HCPCS | Performed by: INTERNAL MEDICINE

## 2024-08-19 PROCEDURE — C1887 CATHETER, GUIDING: HCPCS | Performed by: INTERNAL MEDICINE

## 2024-08-19 PROCEDURE — 85347 COAGULATION TIME ACTIVATED: CPT

## 2024-08-19 PROCEDURE — 93010 ELECTROCARDIOGRAM REPORT: CPT | Performed by: INTERNAL MEDICINE

## 2024-08-19 PROCEDURE — 93799 UNLISTED CV SVC/PROCEDURE: CPT | Performed by: INTERNAL MEDICINE

## 2024-08-19 PROCEDURE — 99153 MOD SED SAME PHYS/QHP EA: CPT | Performed by: INTERNAL MEDICINE

## 2024-08-19 PROCEDURE — 99152 MOD SED SAME PHYS/QHP 5/>YRS: CPT | Performed by: INTERNAL MEDICINE

## 2024-08-19 PROCEDURE — 80048 BASIC METABOLIC PNL TOTAL CA: CPT | Performed by: INTERNAL MEDICINE

## 2024-08-19 PROCEDURE — 93454 CORONARY ARTERY ANGIO S&I: CPT | Performed by: INTERNAL MEDICINE

## 2024-08-19 PROCEDURE — 93571 IV DOP VEL&/PRESS C FLO 1ST: CPT | Performed by: INTERNAL MEDICINE

## 2024-08-19 RX ORDER — ASPIRIN 81 MG/1
81 TABLET, CHEWABLE ORAL DAILY
Status: DISCONTINUED | OUTPATIENT
Start: 2024-08-20 | End: 2024-08-20 | Stop reason: HOSPADM

## 2024-08-19 RX ORDER — VERAPAMIL HYDROCHLORIDE 2.5 MG/ML
INJECTION, SOLUTION INTRAVENOUS CODE/TRAUMA/SEDATION MEDICATION
Status: DISCONTINUED | OUTPATIENT
Start: 2024-08-19 | End: 2024-08-19 | Stop reason: HOSPADM

## 2024-08-19 RX ORDER — SODIUM CHLORIDE 9 MG/ML
100 INJECTION, SOLUTION INTRAVENOUS CONTINUOUS
Status: DISPENSED | OUTPATIENT
Start: 2024-08-19 | End: 2024-08-19

## 2024-08-19 RX ORDER — ASPIRIN 81 MG/1
324 TABLET, CHEWABLE ORAL ONCE
Status: DISCONTINUED | OUTPATIENT
Start: 2024-08-19 | End: 2024-08-19

## 2024-08-19 RX ORDER — NITROGLYCERIN 20 MG/100ML
INJECTION INTRAVENOUS CODE/TRAUMA/SEDATION MEDICATION
Status: DISCONTINUED | OUTPATIENT
Start: 2024-08-19 | End: 2024-08-19 | Stop reason: HOSPADM

## 2024-08-19 RX ORDER — SODIUM CHLORIDE 9 MG/ML
50 INJECTION, SOLUTION INTRAVENOUS CONTINUOUS
Status: DISPENSED | OUTPATIENT
Start: 2024-08-19 | End: 2024-08-19

## 2024-08-19 RX ORDER — LEVOTHYROXINE SODIUM 75 UG/1
75 TABLET ORAL
Status: DISCONTINUED | OUTPATIENT
Start: 2024-08-20 | End: 2024-08-20 | Stop reason: HOSPADM

## 2024-08-19 RX ORDER — HEPARIN SODIUM 5000 [USP'U]/ML
5000 INJECTION, SOLUTION INTRAVENOUS; SUBCUTANEOUS EVERY 8 HOURS SCHEDULED
Status: DISCONTINUED | OUTPATIENT
Start: 2024-08-19 | End: 2024-08-20 | Stop reason: HOSPADM

## 2024-08-19 RX ORDER — MIDAZOLAM HYDROCHLORIDE 2 MG/2ML
INJECTION, SOLUTION INTRAMUSCULAR; INTRAVENOUS CODE/TRAUMA/SEDATION MEDICATION
Status: DISCONTINUED | OUTPATIENT
Start: 2024-08-19 | End: 2024-08-19 | Stop reason: HOSPADM

## 2024-08-19 RX ORDER — FENTANYL CITRATE 50 UG/ML
INJECTION, SOLUTION INTRAMUSCULAR; INTRAVENOUS CODE/TRAUMA/SEDATION MEDICATION
Status: DISCONTINUED | OUTPATIENT
Start: 2024-08-19 | End: 2024-08-19 | Stop reason: HOSPADM

## 2024-08-19 RX ORDER — METOPROLOL SUCCINATE 25 MG/1
25 TABLET, EXTENDED RELEASE ORAL DAILY
Status: DISCONTINUED | OUTPATIENT
Start: 2024-08-20 | End: 2024-08-20 | Stop reason: HOSPADM

## 2024-08-19 RX ORDER — CALCIUM CARBONATE 500 MG/1
500 TABLET, CHEWABLE ORAL DAILY PRN
Status: DISCONTINUED | OUTPATIENT
Start: 2024-08-19 | End: 2024-08-20 | Stop reason: HOSPADM

## 2024-08-19 RX ORDER — LIDOCAINE HYDROCHLORIDE 10 MG/ML
INJECTION, SOLUTION EPIDURAL; INFILTRATION; INTRACAUDAL; PERINEURAL CODE/TRAUMA/SEDATION MEDICATION
Status: DISCONTINUED | OUTPATIENT
Start: 2024-08-19 | End: 2024-08-19 | Stop reason: HOSPADM

## 2024-08-19 RX ORDER — ISOSORBIDE MONONITRATE 30 MG/1
60 TABLET, EXTENDED RELEASE ORAL DAILY
Qty: 60 TABLET | Refills: 5 | Status: SHIPPED | OUTPATIENT
Start: 2024-08-19 | End: 2024-08-20

## 2024-08-19 RX ORDER — ASPIRIN 81 MG/1
324 TABLET, CHEWABLE ORAL ONCE
Status: COMPLETED | OUTPATIENT
Start: 2024-08-19 | End: 2024-08-19

## 2024-08-19 RX ORDER — ISOSORBIDE MONONITRATE 60 MG/1
60 TABLET, EXTENDED RELEASE ORAL DAILY
Status: DISCONTINUED | OUTPATIENT
Start: 2024-08-20 | End: 2024-08-20 | Stop reason: HOSPADM

## 2024-08-19 RX ORDER — ACETAMINOPHEN 325 MG/1
650 TABLET ORAL EVERY 4 HOURS PRN
Status: DISCONTINUED | OUTPATIENT
Start: 2024-08-19 | End: 2024-08-20 | Stop reason: HOSPADM

## 2024-08-19 RX ORDER — HEPARIN SODIUM 1000 [USP'U]/ML
INJECTION, SOLUTION INTRAVENOUS; SUBCUTANEOUS CODE/TRAUMA/SEDATION MEDICATION
Status: DISCONTINUED | OUTPATIENT
Start: 2024-08-19 | End: 2024-08-19 | Stop reason: HOSPADM

## 2024-08-19 RX ADMIN — CALCIUM CARBONATE (ANTACID) CHEW TAB 500 MG 500 MG: 500 CHEW TAB at 21:14

## 2024-08-19 RX ADMIN — ASPIRIN 81 MG CHEWABLE TABLET 324 MG: 81 TABLET CHEWABLE at 08:15

## 2024-08-19 RX ADMIN — HEPARIN SODIUM 5000 UNITS: 5000 INJECTION INTRAVENOUS; SUBCUTANEOUS at 21:14

## 2024-08-19 RX ADMIN — SODIUM CHLORIDE 50 ML/HR: 0.9 INJECTION, SOLUTION INTRAVENOUS at 10:37

## 2024-08-19 RX ADMIN — SODIUM CHLORIDE 90 ML: 0.9 INJECTION, SOLUTION INTRAVENOUS at 08:13

## 2024-08-19 NOTE — DISCHARGE INSTR - AVS FIRST PAGE
1. No heavy lifting, greater than 10 lbs. or strenuous  activity for 48 hrs. (Thru Aug 22nd )    2.Remove band aid tomorrow Aug 21st).  Shower and wash area- wrist gently with soap and water- beginning tomorrow (Aug 21st). Rinse and pat dry.  Apply new water seal band aid.  Repeat this process for 5 days (Thru Aug 25th). No powders, creams lotions or antibiotic ointments  for 5 days.  No tub baths, hot tubs or swimming for 5 days.     3. Please call our office ([phone]) if you have any fever, redness, swelling, discharge from your wrist access site.

## 2024-08-19 NOTE — INTERVAL H&P NOTE
H&P reviewed. After examining the patient, I find no changed to the H&P since it had been written.    BP (!) 179/86   Pulse 64   Temp 97.8 °F (36.6 °C) (Temporal)   Resp 16   SpO2 95%      Patient re-evaluated. Accept as history and physical.    A&P  CAD  HTN  HLD    Presenting for elective left heart cath today. Vitals and labs reviewed. Cr 1.09.    Juan Greene, DO/August 19, 2024/11:16 AM

## 2024-08-20 VITALS
TEMPERATURE: 97.8 F | HEART RATE: 64 BPM | DIASTOLIC BLOOD PRESSURE: 63 MMHG | OXYGEN SATURATION: 95 % | SYSTOLIC BLOOD PRESSURE: 145 MMHG | RESPIRATION RATE: 18 BRPM

## 2024-08-20 PROCEDURE — NC001 PR NO CHARGE: Performed by: PHYSICIAN ASSISTANT

## 2024-08-20 RX ORDER — ISOSORBIDE MONONITRATE 30 MG/1
30 TABLET, EXTENDED RELEASE ORAL DAILY
Qty: 60 TABLET | Refills: 0 | Status: SHIPPED | OUTPATIENT
Start: 2024-08-20

## 2024-08-20 RX ADMIN — ISOSORBIDE MONONITRATE 60 MG: 60 TABLET, EXTENDED RELEASE ORAL at 08:09

## 2024-08-20 RX ADMIN — HEPARIN SODIUM 5000 UNITS: 5000 INJECTION INTRAVENOUS; SUBCUTANEOUS at 06:37

## 2024-08-20 RX ADMIN — ASPIRIN 81 MG CHEWABLE TABLET 81 MG: 81 TABLET CHEWABLE at 08:09

## 2024-08-20 RX ADMIN — LEVOTHYROXINE SODIUM 75 MCG: 75 TABLET ORAL at 06:37

## 2024-08-20 NOTE — DISCHARGE SUMMARY
Discharge Summary - Meghna Granda 89 y.o. female MRN: [id number]    Unit/Bed#: E4 -01 Encounter: [id number]    Admission Date: 8/19/2024   Discharge Date:     Disposition: Home    Condition at Discharge: good     PCP:[facility], Dr. Sreedhar Brand    OP Cardiologist: Dr. Sampson Engle    Interventional cardiologist: Dr. Sampson Engle & Dr. 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    1st Diag lesion is 60% stenosed.    1st Mrg lesion is 40% stenosed.    Prox RCA lesion is 100% stenosed.    Mid LAD lesion is 20% stenosed.    Patent old LAD stent. No interventions taken during cath.    HPI and Hospital Course: Patient presented for elective OP cath for chest tightness, fatigue and DAIGLE. Was started on Imdur as OP. Cath showed 2 V disease none of which were flow limiting or required stenting. Imdur was increased however this caused patient to have dizziness. Will decrease imdur back to 30 mg QD. Continue ASA. Continue medical management for CAD.    Patient was monitored overnight without events.    This morning patient's questions were answered and she is amenable to dc to home. Ambulatory referral was placed for cardiac rehab.    Review of Systems   Constitutional:  Negative for fatigue.   Respiratory:  Negative for shortness of breath.    Cardiovascular:         + Pt had faint chest discomfort early this morning which was improved with imdur   Musculoskeletal:  Negative for back pain.   Neurological:  Positive for dizziness. Negative for syncope and light-headedness.        Pt has chronic dizziness from her levothyroxine however imdur 60 mg worsened her dizziness this morning     Current Facility-Administered Medications    Medication Dose Route Frequency    acetaminophen (TYLENOL) tablet 650 mg  650 mg Oral Q4H PRN    aspirin chewable tablet 81 mg  81 mg Oral Daily    calcium carbonate (TUMS) chewable tablet 500 mg  500 mg Oral Daily PRN    heparin (porcine) subcutaneous injection 5,000 Units  5,000 Units Subcutaneous Q8H [facility]    isosorbide mononitrate (IMDUR) 24 hr tablet 60 mg  60 mg Oral Daily    levothyroxine tablet 75 mcg  75 mcg Oral Early Morning    metoprolol succinate (TOPROL-XL) 24 hr tablet 25 mg  25 mg Oral Daily     /63 (BP Location: Left arm)   Pulse 64   Temp 97.8 °F (36.6 °C) (Temporal)   Resp 18   SpO2 95%     Physical Exam  Vitals and nursing note reviewed.   Constitutional:       General: She is not in acute distress.  HENT:      Head: Normocephalic and atraumatic.      Nose: Nose normal.      Mouth/Throat:      Mouth: Mucous membranes are moist.   Eyes:      Conjunctiva/sclera: Conjunctivae normal.   Neck:      Vascular: No JVD.   Cardiovascular:      Rate and Rhythm: Normal rate and regular rhythm.      Heart sounds: S1 normal and S2 normal.   Pulmonary:      Effort: Pulmonary effort is normal.   Abdominal:      General: Abdomen is flat.   Musculoskeletal:         General: No swelling.   Skin:     Comments: Ecchymosis right wrist no hematoma   Neurological:      Mental Status: She is alert and oriented to person, place, and time.   Psychiatric:         Behavior: Behavior normal.     Discharge instructions/Information to patient and family:   See after visit summary for information provided to patient and family.      Provisions for Follow-Up Care:  See after visit summary for information related to follow-up care and any pertinent home health orders.      Planned Readmission: No    ** Please Note: [employer] Dictation voice to text software may have been used in the creation of this document. **

## 2024-08-20 NOTE — PLAN OF CARE
Problem: Prexisting or High Potential for Compromised Skin Integrity  Goal: Skin integrity is maintained or improved  Description: INTERVENTIONS:  - Identify patients at risk for skin breakdown  - Assess and monitor skin integrity  - Assess and monitor nutrition and hydration status  - Monitor labs   - Assess for incontinence   - Turn and reposition patient  - Assist with mobility/ambulation  - Relieve pressure over bony prominences  - Avoid friction and shearing  - Provide appropriate hygiene as needed including keeping skin clean and dry  - Evaluate need for skin moisturizer/barrier cream  - Collaborate with interdisciplinary team   - Patient/family teaching  - Consider wound care consult   Outcome: Progressing     Problem: PAIN - ADULT  Goal: Verbalizes/displays adequate comfort level or baseline comfort level  Description: Interventions:  - Encourage patient to monitor pain and request assistance  - Assess pain using appropriate pain scale  - Administer analgesics based on type and severity of pain and evaluate response  - Implement non-pharmacological measures as appropriate and evaluate response  - Consider cultural and social influences on pain and pain management  - Notify physician/advanced practitioner if interventions unsuccessful or patient reports new pain  Outcome: Progressing     Problem: INFECTION - ADULT  Goal: Absence or prevention of progression during hospitalization  Description: INTERVENTIONS:  - Assess and monitor for signs and symptoms of infection  - Monitor lab/diagnostic results  - Monitor all insertion sites, i.e. indwelling lines, tubes, and drains  - Monitor endotracheal if appropriate and nasal secretions for changes in amount and color  - [place] appropriate cooling/warming therapies per order  - Administer medications as ordered  - Instruct and encourage patient and family to use good hand hygiene technique  - Identify and instruct in appropriate isolation precautions for  identified infection/condition  Outcome: Progressing  Goal: Absence of fever/infection during neutropenic period  Description: INTERVENTIONS:  - Monitor WBC    Outcome: Progressing     Problem: SAFETY ADULT  Goal: Patient will remain free of falls  Description: INTERVENTIONS:  - Educate patient/family on patient safety including physical limitations  - Instruct patient to call for assistance with activity   - Consult OT/PT to assist with strengthening/mobility   - Keep Call bell within reach  - Keep bed low and locked with side rails adjusted as appropriate  - Keep care items and personal belongings within reach  - Initiate and maintain comfort rounds  - Make Fall Risk Sign visible to staff  - Offer Toileting every 2 Hours, in advance of need  - Initiate/Maintain bed/chair alarm  - Obtain necessary fall risk management equipment: bed/chair alarm  - Apply yellow socks and bracelet for high fall risk patients  - Consider moving patient to room near nurses station  Outcome: Progressing  Goal: Maintain or return to baseline ADL function  Description: INTERVENTIONS:  -  Assess patient's ability to carry out ADLs; assess patient's baseline for ADL function and identify physical deficits which impact ability to perform ADLs (bathing, care of mouth/teeth, toileting, grooming, dressing, etc.)  - Assess/evaluate cause of self-care deficits   - Assess range of motion  - Assess patient's mobility; develop plan if impaired  - Assess patient's need for assistive devices and provide as appropriate  - Encourage maximum independence but intervene and supervise when necessary  - Involve family in performance of ADLs  - Assess for home care needs following discharge   - Consider OT consult to assist with ADL evaluation and planning for discharge  - Provide patient education as appropriate  Outcome: Progressing  Goal: Maintains/Returns to pre admission functional level  Description: INTERVENTIONS:  - Perform AM-PAC 6 Click Basic  Mobility/ Daily Activity assessment daily.  - Set and communicate daily mobility goal to care team and patient/family/caregiver.   - Collaborate with rehabilitation services on mobility goals if consulted  - Perform Range of Motion 4 times a day.  - Reposition patient every 2 hours.  - Dangle patient 3 times a day  - Stand patient 3 times a day  - Ambulate patient 3 times a day  - Out of bed to chair 3 times a day   - Out of bed for meals 3 times a day  - Out of bed for toileting  - Record patient progress and toleration of activity level   Outcome: Progressing     Problem: DISCHARGE PLANNING  Goal: Discharge to home or other facility with appropriate resources  Description: INTERVENTIONS:  - Identify barriers to discharge w/patient and caregiver  - Arrange for needed discharge resources and transportation as appropriate  - Identify discharge learning needs (meds, wound care, etc.)  - Arrange for interpretive services to assist at discharge as needed  - Refer to Case Management Department for coordinating discharge planning if the patient needs post-hospital services based on physician/advanced practitioner order or complex needs related to functional status, cognitive ability, or social support system  Outcome: Progressing     Problem: Knowledge Deficit  Goal: Patient/family/caregiver demonstrates understanding of disease process, treatment plan, medications, and discharge instructions  Description: Complete learning assessment and assess knowledge base.  Interventions:  - Provide teaching at level of understanding  - Provide teaching via preferred learning methods  Outcome: Progressing

## 2024-08-20 NOTE — PLAN OF CARE
Problem: Prexisting or High Potential for Compromised Skin Integrity  Goal: Skin integrity is maintained or improved  Description: INTERVENTIONS:  - Identify patients at risk for skin breakdown  - Assess and monitor skin integrity  - Assess and monitor nutrition and hydration status  - Monitor labs   - Assess for incontinence   - Turn and reposition patient  - Assist with mobility/ambulation  - Relieve pressure over bony prominences  - Avoid friction and shearing  - Provide appropriate hygiene as needed including keeping skin clean and dry  - Evaluate need for skin moisturizer/barrier cream  - Collaborate with interdisciplinary team   - Patient/family teaching  - Consider wound care consult   Outcome: Progressing     Problem: PAIN - ADULT  Goal: Verbalizes/displays adequate comfort level or baseline comfort level  Description: Interventions:  - Encourage patient to monitor pain and request assistance  - Assess pain using appropriate pain scale  - Administer analgesics based on type and severity of pain and evaluate response  - Implement non-pharmacological measures as appropriate and evaluate response  - Consider cultural and social influences on pain and pain management  - Notify physician/advanced practitioner if interventions unsuccessful or patient reports new pain  Outcome: Progressing     Problem: INFECTION - ADULT  Goal: Absence or prevention of progression during hospitalization  Description: INTERVENTIONS:  - Assess and monitor for signs and symptoms of infection  - Monitor lab/diagnostic results  - Monitor all insertion sites, i.e. indwelling lines, tubes, and drains  - Monitor endotracheal if appropriate and nasal secretions for changes in amount and color  - [place] appropriate cooling/warming therapies per order  - Administer medications as ordered  - Instruct and encourage patient and family to use good hand hygiene technique  - Identify and instruct in appropriate isolation precautions for  identified infection/condition  Outcome: Progressing  Goal: Absence of fever/infection during neutropenic period  Description: INTERVENTIONS:  - Monitor WBC    Outcome: Progressing     Problem: SAFETY ADULT  Goal: Patient will remain free of falls  Description: INTERVENTIONS:  - Educate patient/family on patient safety including physical limitations  - Instruct patient to call for assistance with activity   - Consult OT/PT to assist with strengthening/mobility   - Keep Call bell within reach  - Keep bed low and locked with side rails adjusted as appropriate  - Keep care items and personal belongings within reach  - Initiate and maintain comfort rounds  - Make Fall Risk Sign visible to staff  - Offer Toileting every 2 Hours, in advance of need  - Initiate/Maintain bed alarm  - Obtain necessary fall risk management equipment:   - Apply yellow socks and bracelet for high fall risk patients  - Consider moving patient to room near nurses station  Outcome: Progressing  Goal: Maintain or return to baseline ADL function  Description: INTERVENTIONS:  -  Assess patient's ability to carry out ADLs; assess patient's baseline for ADL function and identify physical deficits which impact ability to perform ADLs (bathing, care of mouth/teeth, toileting, grooming, dressing, etc.)  - Assess/evaluate cause of self-care deficits   - Assess range of motion  - Assess patient's mobility; develop plan if impaired  - Assess patient's need for assistive devices and provide as appropriate  - Encourage maximum independence but intervene and supervise when necessary  - Involve family in performance of ADLs  - Assess for home care needs following discharge   - Consider OT consult to assist with ADL evaluation and planning for discharge  - Provide patient education as appropriate  Outcome: Progressing  Goal: Maintains/Returns to pre admission functional level  Description: INTERVENTIONS:  - Perform AM-PAC 6 Click Basic Mobility/ Daily Activity  assessment daily.  - Set and communicate daily mobility goal to care team and patient/family/caregiver.   - Collaborate with rehabilitation services on mobility goals if consulted  - Perform Range of Motion 3 times a day.  - Reposition patient every 3 hours.  - Dangle patient 3 times a day  - Stand patient 3 times a day  - Ambulate patient 3 times a day  - Out of bed to chair 3 times a day   - Out of bed for meals 3 times a day  - Out of bed for toileting  - Record patient progress and toleration of activity level   Outcome: Progressing     Problem: DISCHARGE PLANNING  Goal: Discharge to home or other facility with appropriate resources  Description: INTERVENTIONS:  - Identify barriers to discharge w/patient and caregiver  - Arrange for needed discharge resources and transportation as appropriate  - Identify discharge learning needs (meds, wound care, etc.)  - Arrange for interpretive services to assist at discharge as needed  - Refer to Case Management Department for coordinating discharge planning if the patient needs post-hospital services based on physician/advanced practitioner order or complex needs related to functional status, cognitive ability, or social support system  Outcome: Progressing     Problem: Knowledge Deficit  Goal: Patient/family/caregiver demonstrates understanding of disease process, treatment plan, medications, and discharge instructions  Description: Complete learning assessment and assess knowledge base.  Interventions:  - Provide teaching at level of understanding  - Provide teaching via preferred learning methods  Outcome: Progressing

## 2024-08-20 NOTE — PLAN OF CARE
Problem: Prexisting or High Potential for Compromised Skin Integrity  Goal: Skin integrity is maintained or improved  Description: INTERVENTIONS:  - Identify patients at risk for skin breakdown  - Assess and monitor skin integrity  - Assess and monitor nutrition and hydration status  - Monitor labs   - Assess for incontinence   - Turn and reposition patient  - Assist with mobility/ambulation  - Relieve pressure over bony prominences  - Avoid friction and shearing  - Provide appropriate hygiene as needed including keeping skin clean and dry  - Evaluate need for skin moisturizer/barrier cream  - Collaborate with interdisciplinary team   - Patient/family teaching  - Consider wound care consult   8/20/2024 1028 by Anuj Manzanares RN  Outcome: Adequate for Discharge  8/20/2024 0750 by Anuj Manzanares RN  Outcome: Progressing     Problem: PAIN - ADULT  Goal: Verbalizes/displays adequate comfort level or baseline comfort level  Description: Interventions:  - Encourage patient to monitor pain and request assistance  - Assess pain using appropriate pain scale  - Administer analgesics based on type and severity of pain and evaluate response  - Implement non-pharmacological measures as appropriate and evaluate response  - Consider cultural and social influences on pain and pain management  - Notify physician/advanced practitioner if interventions unsuccessful or patient reports new pain  8/20/2024 1028 by Anuj Manzanares RN  Outcome: Adequate for Discharge  8/20/2024 0750 by Anuj Manzanares RN  Outcome: Progressing     Problem: INFECTION - ADULT  Goal: Absence or prevention of progression during hospitalization  Description: INTERVENTIONS:  - Assess and monitor for signs and symptoms of infection  - Monitor lab/diagnostic results  - Monitor all insertion sites, i.e. indwelling lines, tubes, and drains  - Monitor endotracheal if appropriate and nasal secretions for changes in amount and color  - [place] appropriate cooling/warming  therapies per order  - Administer medications as ordered  - Instruct and encourage patient and family to use good hand hygiene technique  - Identify and instruct in appropriate isolation precautions for identified infection/condition  8/20/2024 1028 by Anuj Manzanares RN  Outcome: Adequate for Discharge  8/20/2024 0750 by Anuj Manzanares RN  Outcome: Progressing  Goal: Absence of fever/infection during neutropenic period  Description: INTERVENTIONS:  - Monitor WBC    8/20/2024 1028 by Anuj Manzanares RN  Outcome: Adequate for Discharge  8/20/2024 0750 by Anuj Manzanares RN  Outcome: Progressing     Problem: SAFETY ADULT  Goal: Patient will remain free of falls  Description: INTERVENTIONS:  - Educate patient/family on patient safety including physical limitations  - Instruct patient to call for assistance with activity   - Consult OT/PT to assist with strengthening/mobility   - Keep Call bell within reach  - Keep bed low and locked with side rails adjusted as appropriate  - Keep care items and personal belongings within reach  - Initiate and maintain comfort rounds  - Make Fall Risk Sign visible to staff  - Offer Toileting every 2 Hours, in advance of need  - Initiate/Maintain bed alarm  - Obtain necessary fall risk management equipment:   - Apply yellow socks and bracelet for high fall risk patients  - Consider moving patient to room near nurses station  8/20/2024 1028 by Anuj Manzanares RN  Outcome: Adequate for Discharge  8/20/2024 0750 by Anuj Manzanares RN  Outcome: Progressing  Goal: Maintain or return to baseline ADL function  Description: INTERVENTIONS:  -  Assess patient's ability to carry out ADLs; assess patient's baseline for ADL function and identify physical deficits which impact ability to perform ADLs (bathing, care of mouth/teeth, toileting, grooming, dressing, etc.)  - Assess/evaluate cause of self-care deficits   - Assess range of motion  - Assess patient's mobility; develop plan if impaired  - Assess patient's need  for assistive devices and provide as appropriate  - Encourage maximum independence but intervene and supervise when necessary  - Involve family in performance of ADLs  - Assess for home care needs following discharge   - Consider OT consult to assist with ADL evaluation and planning for discharge  - Provide patient education as appropriate  8/20/2024 1028 by Anuj Manzanares RN  Outcome: Adequate for Discharge  8/20/2024 0750 by Anuj Manzanares RN  Outcome: Progressing  Goal: Maintains/Returns to pre admission functional level  Description: INTERVENTIONS:  - Perform AM-PAC 6 Click Basic Mobility/ Daily Activity assessment daily.  - Set and communicate daily mobility goal to care team and patient/family/caregiver.   - Collaborate with rehabilitation services on mobility goals if consulted  - Perform Range of Motion 3 times a day.  - Reposition patient every 3 hours.  - Dangle patient 3 times a day  - Stand patient 3 times a day  - Ambulate patient 3 times a day  - Out of bed to chair 3 times a day   - Out of bed for meals 3 times a day  - Out of bed for toileting  - Record patient progress and toleration of activity level   8/20/2024 1028 by Anuj Manzanares RN  Outcome: Adequate for Discharge  8/20/2024 0750 by Anuj Manzanares RN  Outcome: Progressing     Problem: DISCHARGE PLANNING  Goal: Discharge to home or other facility with appropriate resources  Description: INTERVENTIONS:  - Identify barriers to discharge w/patient and caregiver  - Arrange for needed discharge resources and transportation as appropriate  - Identify discharge learning needs (meds, wound care, etc.)  - Arrange for interpretive services to assist at discharge as needed  - Refer to Case Management Department for coordinating discharge planning if the patient needs post-hospital services based on physician/advanced practitioner order or complex needs related to functional status, cognitive ability, or social support system  8/20/2024 1028 by Anuj Manzanares  RN  Outcome: Adequate for Discharge  8/20/2024 0750 by Anuj Manzanares RN  Outcome: Progressing     Problem: Knowledge Deficit  Goal: Patient/family/caregiver demonstrates understanding of disease process, treatment plan, medications, and discharge instructions  Description: Complete learning assessment and assess knowledge base.  Interventions:  - Provide teaching at level of understanding  - Provide teaching via preferred learning methods  8/20/2024 1028 by Anuj Manzanares RN  Outcome: Adequate for Discharge  8/20/2024 0750 by Anuj Manzanares RN  Outcome: Progressing

## 2024-08-27 ENCOUNTER — TELEPHONE (OUTPATIENT)
Age: 89
End: 2024-08-27

## 2024-08-27 NOTE — TELEPHONE ENCOUNTER
"Hello,    The following message was sent via e-mail to the leadership team:     Please advise if you can help facilitate the following overbook request:    Patient Name: Meghna Granda    Patient MRN: [id number]    Call back #:     Insurance: [employer]/[place]    Department:Cardiology    Speciality: General Cardiology    Reason for overbook request: OTHER (PLEASE WRITE REASON IN COMMENT SECTION)    Comments (Write \"N/a\" if no comments): Pt was told to schedule f/u after Cath procedure 8/19/24.  Pt has appt scheduled for 10/9/24 & wonders if that is soon enough since it is first available w Dr Engle?  APPs were refused.    Requested doctor and location: [facility]    Date of current appointment: 10/9/24      Thank you.         "

## 2024-09-17 ENCOUNTER — OFFICE VISIT (OUTPATIENT)
Dept: FAMILY MEDICINE CLINIC | Facility: CLINIC | Age: 89
End: 2024-09-17
Payer: COMMERCIAL

## 2024-09-17 VITALS
TEMPERATURE: 97.5 F | BODY MASS INDEX: 25.6 KG/M2 | WEIGHT: 127 LBS | HEIGHT: 59 IN | HEART RATE: 73 BPM | DIASTOLIC BLOOD PRESSURE: 72 MMHG | SYSTOLIC BLOOD PRESSURE: 122 MMHG | OXYGEN SATURATION: 96 %

## 2024-09-17 DIAGNOSIS — E03.9 ACQUIRED HYPOTHYROIDISM: ICD-10-CM

## 2024-09-17 DIAGNOSIS — I25.10 CORONARY ARTERY DISEASE INVOLVING NATIVE HEART WITHOUT ANGINA PECTORIS, UNSPECIFIED VESSEL OR LESION TYPE: ICD-10-CM

## 2024-09-17 DIAGNOSIS — R42 DIZZINESS: Primary | ICD-10-CM

## 2024-09-17 DIAGNOSIS — I10 ESSENTIAL HYPERTENSION: ICD-10-CM

## 2024-09-17 DIAGNOSIS — E78.00 HYPERCHOLESTEROLEMIA: ICD-10-CM

## 2024-09-17 PROCEDURE — 93000 ELECTROCARDIOGRAM COMPLETE: CPT | Performed by: NURSE PRACTITIONER

## 2024-09-17 PROCEDURE — 99214 OFFICE O/P EST MOD 30 MIN: CPT | Performed by: NURSE PRACTITIONER

## 2024-09-17 NOTE — PROGRESS NOTES
-- DO NOT REPLY / DO NOT REPLY ALL --  -- This inbox is not monitored. If this was sent to the wrong provider or department, reroute message to [facility] [employer] pool. --  -- Message is from [facility] [employer] ([facility]) --        Referral Request  Name of Specialist: Stephanie Dixon  Provider's specialty: Cardiology    Medical condition for referral:  patients pulse dropped below 30 today during colonoscopy and endoscopy. Patients GI doctor recommended patient to see a cardiologist     Is this a NEW request?: yes      Referral ordered by: BEAU Rossi      Insurance type:       Payor: [facility] MEDICARE ADVANTAGE / Plan: [employer] [facility] MEDICARE ADVANTAGE PPO / Product Type: MEDADV    Preferred Delivery Method  Fax - number to send to: [phone]    Caller Information       Contact Date/Time Type Contact Phone/Fax    09/17/2024 02:48 PM CDT Phone (Incoming) Eyad (Daughter) [phone]            Alternative phone number: none    Can a detailed message be left?  Yes - Voicemail     Patient has been advised the message will be addressed within 2-3 business days        Assessment/Plan:    No problem-specific Assessment & Plan notes found for this encounter  Diagnoses and all orders for this visit:    Dermatitis  -     hydrocortisone 2 5 % ointment; Apply topically 2 (two) times a day          Subjective:      Patient ID: Pebbles Godfrey is a 80 y o  female  Patient presents for rash on the right forearm- started last friday after she thinks she was stung by a bee while outside by plants  Area became red, swollen, warm to touch  States that she started to use cortisone cream and it has significantly improved but continues to itch  Use benadryl prn for itching but be careful as makes drowsy- fall prevention reviewed  Use hydrocortisone as needed  S/s of when to call reviewed  Rash  This is a new problem  The current episode started in the past 7 days  The problem has been rapidly improving since onset  The affected locations include the left arm  The rash is characterized by itchiness and dryness  She was exposed to an insect bite/sting  Pertinent negatives include no anorexia, congestion, cough, diarrhea, eye pain, facial edema, fatigue, fever, joint pain, nail changes, rhinorrhea, shortness of breath, sore throat or vomiting  Past treatments include topical steroids  The treatment provided mild relief  There is no history of allergies, asthma, eczema or varicella  The following portions of the patient's history were reviewed and updated as appropriate: allergies, current medications, past family history, past medical history, past social history, past surgical history and problem list     Review of Systems   Constitutional: Negative for fatigue and fever  HENT: Negative for congestion, rhinorrhea and sore throat  Eyes: Negative for pain  Respiratory: Negative for cough and shortness of breath  Gastrointestinal: Negative for anorexia, diarrhea and vomiting  Musculoskeletal: Negative for arthralgias, joint pain and myalgias  Skin: Positive for rash  Negative for color change, nail changes, pallor and wound  Objective:      /74   Pulse 70   Temp 98 5 °F (36 9 °C)   Ht 4' 11" (1 499 m)   Wt 64 9 kg (143 lb)   SpO2 97%   BMI 28 88 kg/m²          Physical Exam  Vitals and nursing note reviewed  Constitutional:       General: She is not in acute distress  Appearance: Normal appearance  She is normal weight  She is not ill-appearing or toxic-appearing  HENT:      Head: Normocephalic and atraumatic  Right Ear: Tympanic membrane, ear canal and external ear normal  There is no impacted cerumen  Left Ear: Tympanic membrane, ear canal and external ear normal  There is no impacted cerumen  Nose: Nose normal  No congestion or rhinorrhea  Mouth/Throat:      Mouth: Mucous membranes are moist       Pharynx: Oropharynx is clear  No oropharyngeal exudate or posterior oropharyngeal erythema  Eyes:      General: No scleral icterus  Right eye: No discharge  Left eye: No discharge  Extraocular Movements: Extraocular movements intact  Conjunctiva/sclera: Conjunctivae normal       Pupils: Pupils are equal, round, and reactive to light  Cardiovascular:      Rate and Rhythm: Normal rate and regular rhythm  Pulses: Normal pulses  Heart sounds: Normal heart sounds  No murmur heard  No friction rub  No gallop  Pulmonary:      Effort: Pulmonary effort is normal  No respiratory distress  Breath sounds: Normal breath sounds  No stridor  No wheezing, rhonchi or rales  Chest:      Chest wall: No tenderness  Abdominal:      General: Abdomen is flat  Bowel sounds are normal       Palpations: Abdomen is soft  Tenderness: There is no abdominal tenderness  There is no guarding  Skin:     Coloration: Skin is not jaundiced or pale  Findings: Rash (dry macular rash on the right forearm, rasied  no tenderness, erythema, drainage or warmth ) present  No bruising or lesion     Neurological: General: No focal deficit present  Mental Status: She is alert and oriented to person, place, and time  Mental status is at baseline  Psychiatric:         Mood and Affect: Mood normal          Behavior: Behavior normal          Thought Content:  Thought content normal          Judgment: Judgment normal

## 2024-09-17 NOTE — PROGRESS NOTES
Ambulatory Visit  Name: Meghna Granda      : 8/15/1933      MRN: [id number]  Encounter Provider: SHILA Cee  Encounter Date: 2024   Encounter department: [facility]    Assessment & Plan  Dizziness    Orders:    POCT ECG    Acquired hypothyroidism    Orders:    TSH, 3rd generation with Free T4 reflex; Future    Essential hypertension    Orders:    CBC and differential; Future    Comprehensive metabolic panel; Future    Hypercholesterolemia    Orders:    Lipid panel; Future    Coronary artery disease involving native heart without angina pectoris, unspecified vessel or lesion type       CP s/p cath w/ -RCA, 50% pLAD, 20% mLAD, 60% D1 and 40% OM1, 2024   Denies any symptoms of angina. She is on Imdur 30 mg and Metformin XL 25 mg 1/2 tablet daily.      History of Present Illness     Patient presents for follow-up.  Patient states that since she has been discharged from the hospital , she has been having intermittent dizziness.  States he usually starts around 8 or 9 AM after taking her Imdur and lasts until approximately 5 PM.  After that she feels okay.  She does not experience room spinning but more so of lightheadedness like she is going to pass out.  No other associated symptoms except a mild frontal headache that goes away as well.  She did not take her Imdur today as she was experiencing dizziness this morning.  Orthostatics completed in the office today blood pressure went from 138/72 lying down to 120/70 standing.  She did experience significant lightheadedness with this. Will reach out to her cardiologist for their recommendations. Chronically, she feels like her metoprolol XL 25 mg 1/2 tablet does make her fatigued. Instructed patient to change positions slowly and wear compression stockings.  EKG as interpreted by me NSR without changes from previous EKG.             History obtained from : patient  Review of Systems   Constitutional:   "Negative for chills and fever.   Eyes:  Negative for pain and visual disturbance.   Respiratory:  Negative for cough and shortness of breath.    Cardiovascular:  Negative for chest pain and palpitations.   Gastrointestinal:  Negative for abdominal pain, constipation, diarrhea, nausea and vomiting.   Genitourinary:  Negative for decreased urine volume, difficulty urinating, dyspareunia, dysuria, frequency, hematuria and urgency.   Musculoskeletal:  Negative for arthralgias and back pain.   Skin:  Negative for color change and rash.   Neurological:  Positive for dizziness and light-headedness. Negative for seizures and syncope.   All other systems reviewed and are negative.          Objective     /72 Comment: standing for 3 minutes  Pulse 73   Temp 97.5 °F (36.4 °C)   Ht 4' 11\" (1.499 m)   Wt 57.6 kg (127 lb)   SpO2 96%   BMI 25.65 kg/m²     Physical Exam  Vitals and nursing note reviewed.   Constitutional:       General: She is not in acute distress.     Appearance: She is well-developed.   HENT:      Head: Normocephalic and atraumatic.   Eyes:      Extraocular Movements: Extraocular movements intact.      Conjunctiva/sclera: Conjunctivae normal.      Pupils: Pupils are equal, round, and reactive to light.   Cardiovascular:      Rate and Rhythm: Normal rate and regular rhythm.      Pulses: Normal pulses.      Heart sounds: Normal heart sounds. No murmur heard.  Pulmonary:      Effort: Pulmonary effort is normal. No respiratory distress.      Breath sounds: Normal breath sounds.   Abdominal:      General: Abdomen is flat.      Palpations: Abdomen is soft.      Tenderness: There is no abdominal tenderness.   Musculoskeletal:         General: No swelling.      Cervical back: Neck supple.      Right lower leg: No edema.      Left lower leg: No edema.   Skin:     General: Skin is warm and dry.      Capillary Refill: Capillary refill takes less than 2 seconds.   Neurological:      General: No focal deficit " present.      Mental Status: She is alert and oriented to person, place, and time. Mental status is at baseline.   Psychiatric:         Mood and Affect: Mood normal.         Behavior: Behavior normal.         Thought Content: Thought content normal.         Judgment: Judgment normal.

## 2024-10-09 ENCOUNTER — OFFICE VISIT (OUTPATIENT)
Dept: CARDIOLOGY CLINIC | Facility: CLINIC | Age: 89
End: 2024-10-09
Payer: COMMERCIAL

## 2024-10-09 VITALS
BODY MASS INDEX: 26.04 KG/M2 | OXYGEN SATURATION: 98 % | SYSTOLIC BLOOD PRESSURE: 124 MMHG | WEIGHT: 129.2 LBS | HEART RATE: 72 BPM | HEIGHT: 59 IN | DIASTOLIC BLOOD PRESSURE: 70 MMHG

## 2024-10-09 DIAGNOSIS — R06.09 DYSPNEA ON EXERTION: ICD-10-CM

## 2024-10-09 DIAGNOSIS — E78.00 HYPERCHOLESTEROLEMIA: ICD-10-CM

## 2024-10-09 DIAGNOSIS — I25.10 CORONARY ARTERY DISEASE INVOLVING NATIVE CORONARY ARTERY OF NATIVE HEART WITHOUT ANGINA PECTORIS: Primary | ICD-10-CM

## 2024-10-09 DIAGNOSIS — I10 ESSENTIAL HYPERTENSION: ICD-10-CM

## 2024-10-09 PROCEDURE — 99214 OFFICE O/P EST MOD 30 MIN: CPT | Performed by: INTERNAL MEDICINE

## 2024-10-09 NOTE — PROGRESS NOTES
Cardiology Follow Up    Meghna Granda  8/15/1933  [id number]  [facility] CARDIAC CATH LAB  [address] [facility] [address]  [phone]    1. Coronary artery disease involving native coronary artery of native heart without angina pectoris        2. Essential hypertension        3. Hypercholesterolemia        4. Dyspnea on exertion            Interval History: Cardiology follow-up.  Patient is symptomatology significantly improve his chest discomfort and dyspnea.  With addition of nitrates, however she has been complain of dizziness when she takes medications.  The patient was under the impression of limited fluid intake, not certain as to why, last creatinine was 1.0.  She has had no syncope or presyncope.  Limited ambulation.  She tries to be compliant low-cholesterol diet, lipids suboptimal total cholesterol last checked 196, HDL 74 .  She has been intolerant to multiple statins unwilling to take any more statins in the past.  She has been compliant with a low-sodium diet.  Patient complains of paresthesias of her right hand after catheterization, this is improving.      Patient Active Problem List   Diagnosis    Coronary artery disease involving native coronary artery of native heart without angina pectoris    Hypercholesterolemia    Negative depression screening    History of radiofrequency ablation (RFA) procedure for cardiac arrhythmia    Overweight (BMI 25.0-29.9)    Acquired hypothyroidism    Age related osteoporosis    Hyponatremia    Essential hypertension    Dyspnea on exertion    Stage 3a chronic kidney disease (HCC)    Increase in creatinine     Past Medical History:   Diagnosis Date    Cardiac arrhythmia     Coronary artery disease     Disease of thyroid gland     Esophageal reflux     GERD (gastroesophageal reflux disease)     Hypertension     Hypothyroidism     Shingles 11-    Supraventricular tachycardia  ([facility]) 7/27/2016    Varicella-zoster infection      Social History     Socioeconomic History    Marital status:      Spouse name: Not on file    Number of children: 3    Years of education: Not on file    Highest education level: Not on file   Occupational History    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   Tobacco Use    Smoking status: Never    Smokeless tobacco: Never   Vaping Use    Vaping status: Never Used   Substance and Sexual Activity    Alcohol use: No    Drug use: No    Sexual activity: Not Currently   Other Topics Concern    Not on file   Social History Narrative    No caffeine use     Social Determinants of Health     Financial Resource Strain: Low Risk  (11/1/2023)    Overall Financial Resource Strain (CARDIA)     Difficulty of Paying Living Expenses: Not hard at all   Food Insecurity: Not on file   Transportation Needs: No Transportation Needs (11/1/2023)    PRAPARE - Transportation     Lack of Transportation (Medical): No     Lack of Transportation (Non-Medical): No   Physical Activity: Not on file   Stress: Not on file   Social Connections: Not on file   Intimate Partner Violence: Not on file   Housing Stability: Not on file      Family History   Problem Relation Age of Onset    Heart disease Mother     Heart disease Father     COPD Father      Past Surgical History:   Procedure Laterality Date    CARDIAC CATHETERIZATION N/A 8/19/2024    Procedure: Cardiac catheterization;  Surgeon: Sampson Engle MD;  Location: [facility] CARDIAC CATH LAB;  Service: Cardiology    CARDIAC CATHETERIZATION N/A 8/19/2024    Procedure: Cardiac Coronary Angiogram;  Surgeon: Samposn Engle MD;  Location: [facility] CARDIAC CATH [facility];  Service: Cardiology    CARDIAC CATHETERIZATION N/A 8/19/2024    Procedure: Cardiac Left Heart Cath;  Surgeon: Sampson Engle MD;  Location: [facility] CARDIAC CATH LAB;  Service: Cardiology    CHOLECYSTECTOMY      COLON SURGERY      CORONARY ANGIOPLASTY      HYSTERECTOMY  1968    PARTIAL  HYSTERECTOMY      SMALL INTESTINE SURGERY      Partial    TONSILLECTOMY  age 19       Current Outpatient Medications:     aspirin 81 MG tablet, Take 81 mg by mouth every other day , Disp: , Rfl:     isosorbide mononitrate (IMDUR) 30 mg 24 hr tablet, Take 1 tablet (30 mg total) by mouth daily, Disp: 60 tablet, Rfl: 0    ketotifen (ZADITOR) 0.025 % ophthalmic solution, Administer 1 drop to both eyes Three to four times a day, Disp: , Rfl:     levothyroxine 75 mcg tablet, Take 1 tablet (75 mcg total) by mouth daily, Disp: 90 tablet, Rfl: 1    metoprolol succinate (TOPROL-XL) 25 mg 24 hr tablet, Take 1/2 (one-half) tablet by mouth once daily, Disp: 45 tablet, Rfl: 3    Misc Natural Products (Grape Seed Complex) CAPS, Take by mouth One a day, Disp: , Rfl:     PREVIDENT 5000 PLUS 1.1 % CREA, daily, Disp: , Rfl: 0    Tart Cherry 1200 MG CAPS, Take by mouth 500mg   2 daily, Disp: , Rfl:   Allergies   Allergen Reactions    Bextra [Valdecoxib]     Codeine Nausea Only, Headache and Tremor    Demerol [Meperidine] Nausea Only and Headache    Diovan [Valsartan]     Enalapril Cough    Lisinopril Cough    Penicillins     Percocet [Oxycodone-Acetaminophen]     Sulfa Antibiotics Abdominal Pain    Tramadol Nausea Only and Headache    Vicodin [Hydrocodone-Acetaminophen]     Zithromax [Azithromycin] Diarrhea    Zocor [Simvastatin] Hives       Labs:  Admission on 08/19/2024, Discharged on 08/20/2024   Component Date Value    Sodium 08/19/2024 135     Potassium 08/19/2024 3.9     Chloride 08/19/2024 103     CO2 08/19/2024 24     ANION GAP 08/19/2024 8     BUN 08/19/2024 27 (H)     Creatinine 08/19/2024 1.09     Glucose 08/19/2024 102     Glucose, Fasting 08/19/2024 102 (H)     Calcium 08/19/2024 9.3     eGFR 08/19/2024 44     Ventricular Rate 08/19/2024 62     Atrial Rate 08/19/2024 62     [place] Interval 08/19/2024 134     QRSD Interval 08/19/2024 92     QT Interval 08/19/2024 414     QTC Interval 08/19/2024 420     P [place] 08/19/2024 32      QRS Axis 08/19/2024 0     T Wave [place] 08/19/2024 55     Activated Clotting Time,* 08/19/2024 204 (H)     Specimen Type 08/19/2024 VENOUS    Appointment on 08/05/2024   Component Date Value    Magnesium 08/05/2024 1.9     PTH 08/05/2024 22.5     Vit D, 25-Hydroxy 08/05/2024 63.8     Creatinine, Ur 08/05/2024 65.5     Albumin,U,Random 08/05/2024 21.1 (H)     Albumin Creat Ratio 08/05/2024 32 (H)     Color, UA 08/05/2024 Light Yellow     Clarity, UA 08/05/2024 Turbid     Specific Gravity, UA 08/05/2024 1.009     pH, UA 08/05/2024 6.5     Leukocytes, UA 08/05/2024 Large (A)     Nitrite, UA 08/05/2024 Positive (A)     Protein, UA 08/05/2024 Negative     Glucose, UA 08/05/2024 Negative     Ketones, UA 08/05/2024 Negative     Urobilinogen, UA 08/05/2024 <2.0     Bilirubin, UA 08/05/2024 Negative     Occult Blood, UA 08/05/2024 Negative     RBC, UA 08/05/2024 2-4 (A)     WBC, UA 08/05/2024 Innumerable (A)     Epithelial Cells 08/05/2024 Occasional     Bacteria, UA 08/05/2024 Moderate (A)    Telephone on 07/29/2024   Component Date Value    WBC 08/05/2024 4.25 (L)     RBC 08/05/2024 4.19     Hemoglobin 08/05/2024 12.9     Hematocrit 08/05/2024 39.3     MCV 08/05/2024 94     MCH 08/05/2024 30.8     MCHC 08/05/2024 32.8     RDW 08/05/2024 13.7     MPV 08/05/2024 8.8 (L)     Platelets 08/05/2024 335     nRBC 08/05/2024 0     Segmented % 08/05/2024 53     Immature Grans % 08/05/2024 0     Lymphocytes % 08/05/2024 29     Monocytes % 08/05/2024 11     Eosinophils Relative 08/05/2024 6     Basophils Relative 08/05/2024 1     Absolute Neutrophils 08/05/2024 2.26     Absolute Immature Grans 08/05/2024 0.01     Absolute Lymphocytes 08/05/2024 1.21     Absolute Monocytes 08/05/2024 0.48     Eosinophils Absolute 08/05/2024 0.26     Basophils Absolute 08/05/2024 0.03     Sodium 08/05/2024 131 (L)     Potassium 08/05/2024 4.2     Chloride 08/05/2024 96     CO2 08/05/2024 25     ANION GAP 08/05/2024 10     BUN 08/05/2024 16     Creatinine  08/05/2024 0.91     Glucose, Fasting 08/05/2024 89     Calcium 08/05/2024 9.0     AST 08/05/2024 19     ALT 08/05/2024 11     Alkaline Phosphatase 08/05/2024 81     Total Protein 08/05/2024 6.8     Albumin 08/05/2024 3.7     Total Bilirubin 08/05/2024 0.82     eGFR 08/05/2024 55    Appointment on 05/23/2024   Component Date Value    WBC 05/23/2024 4.03 (L)     RBC 05/23/2024 4.31     Hemoglobin 05/23/2024 13.3     Hematocrit 05/23/2024 41.4     MCV 05/23/2024 96     MCH 05/23/2024 30.9     MCHC 05/23/2024 32.1     RDW 05/23/2024 13.2     MPV 05/23/2024 9.2     Platelets 05/23/2024 334     nRBC 05/23/2024 0     Segmented % 05/23/2024 49     Immature Grans % 05/23/2024 0     Lymphocytes % 05/23/2024 38     Monocytes % 05/23/2024 10     Eosinophils Relative 05/23/2024 2     Basophils Relative 05/23/2024 1     Absolute Neutrophils 05/23/2024 1.97     Absolute Immature Grans 05/23/2024 0.01     Absolute Lymphocytes 05/23/2024 1.53     Absolute Monocytes 05/23/2024 0.41     Eosinophils Absolute 05/23/2024 0.07     Basophils Absolute 05/23/2024 0.04     Sodium 05/23/2024 135     Potassium 05/23/2024 4.0     Chloride 05/23/2024 101     CO2 05/23/2024 23     ANION GAP 05/23/2024 11     BUN 05/23/2024 20     Creatinine 05/23/2024 1.02     Glucose, Fasting 05/23/2024 95     Calcium 05/23/2024 8.9     AST 05/23/2024 20     ALT 05/23/2024 13     Alkaline Phosphatase 05/23/2024 92     Total Protein 05/23/2024 7.1     Albumin 05/23/2024 4.1     Total Bilirubin 05/23/2024 0.85     eGFR 05/23/2024 48     TSH 3RD GENERATON 05/23/2024 2.372     Color, UA 05/24/2024 Light Yellow     Clarity, UA 05/24/2024 Turbid     Specific Gravity, UA 05/24/2024 1.008     pH, UA 05/24/2024 6.5     Leukocytes, UA 05/24/2024 Large (A)     Nitrite, UA 05/24/2024 Positive (A)     Protein, UA 05/24/2024 Negative     Glucose, UA 05/24/2024 Negative     Ketones, UA 05/24/2024 Negative     Urobilinogen, UA 05/24/2024 <2.0     Bilirubin, UA 05/24/2024  Negative     Occult Blood, UA 05/24/2024 Negative     RBC, UA 05/24/2024 2-4 (A)     WBC, UA 05/24/2024 Innumerable (A)     Epithelial Cells 05/24/2024 None Seen     Bacteria, UA 05/24/2024 Occasional     Urine Culture 05/24/2024 >100,000 cfu/ml Escherichia coli (A)    Appointment on 04/16/2024   Component Date Value    Sodium 04/16/2024 136     Potassium 04/16/2024 4.3     Chloride 04/16/2024 100     CO2 04/16/2024 27     ANION GAP 04/16/2024 9     BUN 04/16/2024 18     Creatinine 04/16/2024 0.98     Glucose 04/16/2024 86     Calcium 04/16/2024 8.7     eGFR 04/16/2024 50      Imaging: No results found.    Review of Systems:  Review of Systems   Constitutional:  Positive for fatigue. Negative for activity change and diaphoresis.   HENT:  Positive for hearing loss. Negative for nosebleeds.    Respiratory:  Negative for apnea, shortness of breath, wheezing and stridor.    Cardiovascular:  Negative for chest pain, palpitations and leg swelling.   Gastrointestinal:  Negative for anal bleeding and blood in stool.   Endocrine: Positive for cold intolerance.   Genitourinary:  Negative for hematuria.   Musculoskeletal:  Positive for arthralgias and gait problem. Negative for myalgias.   Skin:  Negative for pallor and rash.   Allergic/Immunologic: Negative for immunocompromised state.   Neurological:  Positive for dizziness. Negative for syncope, weakness and light-headedness.   Psychiatric/Behavioral:  Negative for sleep disturbance. The patient is not nervous/anxious.        Physical Exam:  Physical Exam  Vitals reviewed.   Constitutional:       General: She is not in acute distress.     Appearance: Normal appearance. She is not ill-appearing, toxic-appearing or diaphoretic.   Eyes:      General: No scleral icterus.  Neck:      Vascular: No carotid bruit.   Cardiovascular:      Rate and Rhythm: Normal rate and regular rhythm.      Pulses: Normal pulses.      Heart sounds: Normal heart sounds. No murmur heard.     No  friction rub. No gallop.   Pulmonary:      Effort: Pulmonary effort is normal. No respiratory distress.      Breath sounds: Normal breath sounds. No stridor. No wheezing, rhonchi or rales.   Musculoskeletal:      Right lower leg: No edema.      Left lower leg: No edema.   Neurological:      Mental Status: She is alert.   Psychiatric:         Mood and Affect: Mood normal.         Discussion/Summary:  Coronary artery disease, PTCA stent of the LAD in 2008. Last stress test 2018, she did 4 minutes of Doug protocol, achieving target heart rate, there were no EKG criteria for ischemia symptoms, dyspnea appears to be angina equivalent .   longstanding untreated dyslipidemia because of intolerance and unwillingness to take statin therapy.  Previously, she appeared to be more willing and will try low-dose rosuvastatin every other day followed by every day in 2 weeks.  Even at that regimen she could not tolerate it.  Symptoms are consistent or possibly consistent with angina equivalent, we previously did order a pharmacological stress test as she will not be able to walk on a treadmill.  She did not proceed with testing.  echocardiogram.  2022 revealed normal left ventricular systolic function with stage I diastolic dysfunction interestingly so, there was mild left atrial enlargement and mild aortic insufficiency, mild mitral and tricuspid insufficiency with estimated normal pulmonary pressures suggested by the criteria, aortic root was described as mildly dilated measuring 37 mm which I think is an overcall.  Cardiac catheterization 2024 revealed proximal LAD lesion 50% negative for ischemia by IFR criteria.  Previous mid LAD stent was patent, moderate disease in the diagonal, there was mild disease in the marginal and occluded RCA  with left-to-right collaterals.  Symptoms improved with nitrates.  I asked her to continue and keep.  Very hydrated, consideration for decreasing the dose if that does not improve.  Also ask  her to increase her level activity.  Supraventricular tachycardia, AVNRT, status post RFA ablation of the slow pathway.  In 2016, no clinical recurrences.  Last creatinine 1.2, GFR in the 40s.         This note was completed in part utilizing [employer] direct voice recognition software.   Grammatical errors, random word insertion, spelling mistakes, and incomplete sentences may be an occasional consequence of the system secondary to software limitations, ambient noise and hardware issues. At the time of dictation, efforts were made to edit, clarify and /or correct errors.  Please read the chart carefully and recognize, using context, where substitutions have occurred.  If you have any questions or concerns about the context, text or information contained within the body of this dictation, please contact myself, the provider, for further clarification.

## 2024-10-18 ENCOUNTER — TELEPHONE (OUTPATIENT)
Age: 89
End: 2024-10-18

## 2024-10-18 DIAGNOSIS — N18.31 STAGE 3A CHRONIC KIDNEY DISEASE (HCC): Primary | ICD-10-CM

## 2024-10-18 DIAGNOSIS — E87.1 HYPONATREMIA: ICD-10-CM

## 2024-10-18 NOTE — TELEPHONE ENCOUNTER
Called and spoke to Hina.  States labs done early in August and states that Dr. Benz said those are ok for appt.

## 2024-10-19 ENCOUNTER — APPOINTMENT (OUTPATIENT)
Dept: LAB | Facility: CLINIC | Age: 89
End: 2024-10-19
Payer: COMMERCIAL

## 2024-10-19 DIAGNOSIS — E03.9 ACQUIRED HYPOTHYROIDISM: ICD-10-CM

## 2024-10-19 DIAGNOSIS — E87.1 HYPONATREMIA: ICD-10-CM

## 2024-10-19 DIAGNOSIS — E78.00 HYPERCHOLESTEROLEMIA: ICD-10-CM

## 2024-10-19 DIAGNOSIS — N18.31 STAGE 3A CHRONIC KIDNEY DISEASE (HCC): ICD-10-CM

## 2024-10-19 DIAGNOSIS — I10 ESSENTIAL HYPERTENSION: ICD-10-CM

## 2024-10-19 LAB
ALBUMIN SERPL BCG-MCNC: 3.8 G/DL (ref 3.5–5)
ALP SERPL-CCNC: 86 U/L (ref 34–104)
ALT SERPL W P-5'-P-CCNC: 11 U/L (ref 7–52)
ANION GAP SERPL CALCULATED.3IONS-SCNC: 9 MMOL/L (ref 4–13)
AST SERPL W P-5'-P-CCNC: 17 U/L (ref 13–39)
BASOPHILS # BLD AUTO: 0.08 THOUSANDS/ΜL (ref 0–0.1)
BASOPHILS NFR BLD AUTO: 1 % (ref 0–1)
BILIRUB SERPL-MCNC: 0.59 MG/DL (ref 0.2–1)
BUN SERPL-MCNC: 14 MG/DL (ref 5–25)
CALCIUM SERPL-MCNC: 9.1 MG/DL (ref 8.4–10.2)
CHLORIDE SERPL-SCNC: 96 MMOL/L (ref 96–108)
CHOLEST SERPL-MCNC: 172 MG/DL
CO2 SERPL-SCNC: 25 MMOL/L (ref 21–32)
CREAT SERPL-MCNC: 0.78 MG/DL (ref 0.6–1.3)
EOSINOPHIL # BLD AUTO: 0.16 THOUSAND/ΜL (ref 0–0.61)
EOSINOPHIL NFR BLD AUTO: 3 % (ref 0–6)
ERYTHROCYTE [DISTWIDTH] IN BLOOD BY AUTOMATED COUNT: 12.8 % (ref 11.6–15.1)
GFR SERPL CREATININE-BSD FRML MDRD: 66 ML/MIN/1.73SQ M
GLUCOSE P FAST SERPL-MCNC: 104 MG/DL (ref 65–99)
HCT VFR BLD AUTO: 40.1 % (ref 34.8–46.1)
HDLC SERPL-MCNC: 66 MG/DL
HGB BLD-MCNC: 13.2 G/DL (ref 11.5–15.4)
IMM GRANULOCYTES # BLD AUTO: 0.02 THOUSAND/UL (ref 0–0.2)
IMM GRANULOCYTES NFR BLD AUTO: 0 % (ref 0–2)
LDLC SERPL CALC-MCNC: 91 MG/DL (ref 0–100)
LYMPHOCYTES # BLD AUTO: 1.81 THOUSANDS/ΜL (ref 0.6–4.47)
LYMPHOCYTES NFR BLD AUTO: 29 % (ref 14–44)
MAGNESIUM SERPL-MCNC: 2 MG/DL (ref 1.9–2.7)
MCH RBC QN AUTO: 30.2 PG (ref 26.8–34.3)
MCHC RBC AUTO-ENTMCNC: 32.9 G/DL (ref 31.4–37.4)
MCV RBC AUTO: 92 FL (ref 82–98)
MONOCYTES # BLD AUTO: 0.65 THOUSAND/ΜL (ref 0.17–1.22)
MONOCYTES NFR BLD AUTO: 11 % (ref 4–12)
NEUTROPHILS # BLD AUTO: 3.47 THOUSANDS/ΜL (ref 1.85–7.62)
NEUTS SEG NFR BLD AUTO: 56 % (ref 43–75)
NONHDLC SERPL-MCNC: 106 MG/DL
NRBC BLD AUTO-RTO: 0 /100 WBCS
PHOSPHATE SERPL-MCNC: 3.7 MG/DL (ref 2.3–4.1)
PLATELET # BLD AUTO: 447 THOUSANDS/UL (ref 149–390)
PMV BLD AUTO: 8.5 FL (ref 8.9–12.7)
POTASSIUM SERPL-SCNC: 4.3 MMOL/L (ref 3.5–5.3)
PROT SERPL-MCNC: 7.2 G/DL (ref 6.4–8.4)
RBC # BLD AUTO: 4.37 MILLION/UL (ref 3.81–5.12)
SODIUM SERPL-SCNC: 130 MMOL/L (ref 135–147)
TRIGL SERPL-MCNC: 74 MG/DL
TSH SERPL DL<=0.05 MIU/L-ACNC: 1.41 UIU/ML (ref 0.45–4.5)
WBC # BLD AUTO: 6.19 THOUSAND/UL (ref 4.31–10.16)

## 2024-10-19 PROCEDURE — 83735 ASSAY OF MAGNESIUM: CPT

## 2024-10-19 PROCEDURE — 84443 ASSAY THYROID STIM HORMONE: CPT

## 2024-10-19 PROCEDURE — 84100 ASSAY OF PHOSPHORUS: CPT

## 2024-10-19 PROCEDURE — 80053 COMPREHEN METABOLIC PANEL: CPT

## 2024-10-19 PROCEDURE — 85025 COMPLETE CBC W/AUTO DIFF WBC: CPT

## 2024-10-19 PROCEDURE — 80061 LIPID PANEL: CPT

## 2024-10-19 PROCEDURE — 36415 COLL VENOUS BLD VENIPUNCTURE: CPT

## 2024-10-21 ENCOUNTER — APPOINTMENT (OUTPATIENT)
Dept: LAB | Facility: CLINIC | Age: 89
End: 2024-10-21
Payer: COMMERCIAL

## 2024-10-21 DIAGNOSIS — E87.1 HYPONATREMIA: Primary | ICD-10-CM

## 2024-10-21 LAB
CREAT UR-MCNC: 16.1 MG/DL
PROT UR-MCNC: <4 MG/DL

## 2024-10-21 PROCEDURE — 81001 URINALYSIS AUTO W/SCOPE: CPT

## 2024-10-21 PROCEDURE — 84156 ASSAY OF PROTEIN URINE: CPT

## 2024-10-21 PROCEDURE — 82570 ASSAY OF URINE CREATININE: CPT

## 2024-10-22 LAB
BACTERIA UR QL AUTO: ABNORMAL /HPF
BILIRUB UR QL STRIP: NEGATIVE
CLARITY UR: CLEAR
COLOR UR: COLORLESS
GLUCOSE UR STRIP-MCNC: NEGATIVE MG/DL
HGB UR QL STRIP.AUTO: NEGATIVE
KETONES UR STRIP-MCNC: NEGATIVE MG/DL
LEUKOCYTE ESTERASE UR QL STRIP: ABNORMAL
NITRITE UR QL STRIP: POSITIVE
NON-SQ EPI CELLS URNS QL MICRO: ABNORMAL /HPF
PH UR STRIP.AUTO: 6.5 [PH]
PROT UR STRIP-MCNC: NEGATIVE MG/DL
RBC #/AREA URNS AUTO: ABNORMAL /HPF
SP GR UR STRIP.AUTO: 1.01 (ref 1–1.03)
UROBILINOGEN UR STRIP-ACNC: <2 MG/DL
WBC #/AREA URNS AUTO: ABNORMAL /HPF

## 2024-10-25 ENCOUNTER — OFFICE VISIT (OUTPATIENT)
Dept: NEPHROLOGY | Facility: CLINIC | Age: 89
End: 2024-10-25
Payer: COMMERCIAL

## 2024-10-25 ENCOUNTER — TELEPHONE (OUTPATIENT)
Dept: NEPHROLOGY | Facility: CLINIC | Age: 89
End: 2024-10-25

## 2024-10-25 VITALS
OXYGEN SATURATION: 97 % | WEIGHT: 129 LBS | TEMPERATURE: 97.1 F | DIASTOLIC BLOOD PRESSURE: 88 MMHG | BODY MASS INDEX: 26 KG/M2 | SYSTOLIC BLOOD PRESSURE: 148 MMHG | HEART RATE: 75 BPM | HEIGHT: 59 IN

## 2024-10-25 DIAGNOSIS — E87.1 HYPONATREMIA: ICD-10-CM

## 2024-10-25 DIAGNOSIS — I10 ESSENTIAL HYPERTENSION: ICD-10-CM

## 2024-10-25 DIAGNOSIS — N18.31 STAGE 3A CHRONIC KIDNEY DISEASE (HCC): Primary | ICD-10-CM

## 2024-10-25 DIAGNOSIS — E55.9 VITAMIN D INSUFFICIENCY: ICD-10-CM

## 2024-10-25 PROCEDURE — G2211 COMPLEX E/M VISIT ADD ON: HCPCS | Performed by: INTERNAL MEDICINE

## 2024-10-25 PROCEDURE — 99214 OFFICE O/P EST MOD 30 MIN: CPT | Performed by: INTERNAL MEDICINE

## 2024-10-25 NOTE — ASSESSMENT & PLAN NOTE
Continue to encourage about 48 ounces of fluid intake daily.  Serum sodium level noted to be 130 mmol/L.

## 2024-10-25 NOTE — PROGRESS NOTES
[facility] [place]  Tapan Benz DO    Name: Meghna Granda  YOB: 1933      Assessment/Plan:    Stage 3a chronic kidney disease (HCC)  Lab Results   Component Value Date    EGFR 66 10/19/2024    EGFR 44 08/19/2024    EGFR 55 08/05/2024    CREATININE 0.78 10/19/2024    CREATININE 1.09 08/19/2024    CREATININE 0.91 08/05/2024     Kidney function stable, continue to optimize care and avoid nephrotoxins.    Essential hypertension  Blood pressure slightly elevated, continue with current medications.  Continue to encourage low-sodium diet.    Hyponatremia  Continue to encourage about 48 ounces of fluid intake daily.  Serum sodium level noted to be 130 mmol/L.    Vitamin D insufficiency  Continue with vitamin D supplementation.  Levels are appropriate.         Problem List Items Addressed This Visit          Cardiovascular and Mediastinum    Essential hypertension     Blood pressure slightly elevated, continue with current medications.  Continue to encourage low-sodium diet.            Genitourinary    Stage 3a chronic kidney disease (HCC) - Primary     Lab Results   Component Value Date    EGFR 66 10/19/2024    EGFR 44 08/19/2024    EGFR 55 08/05/2024    CREATININE 0.78 10/19/2024    CREATININE 1.09 08/19/2024    CREATININE 0.91 08/05/2024     Kidney function stable, continue to optimize care and avoid nephrotoxins.         Relevant Orders    Albumin / creatinine urine ratio    Urinalysis with microscopic    Comprehensive metabolic panel    CBC and differential    Magnesium    Phosphorus    PTH, intact       Other    Hyponatremia     Continue to encourage about 48 ounces of fluid intake daily.  Serum sodium level noted to be 130 mmol/L.         Vitamin D insufficiency     Continue with vitamin D supplementation.  Levels are appropriate.         Relevant Orders    Vitamin D 25 hydroxy       Patient is stable for the renal standpoint.  Will see her back for regular  appointment in 1 calendar year, check labs prior to that visit.  In the meantime she is aware that should there be any lab abnormalities or other issues arise she can make an appoint with us sooner.    Subjective:      Patient ID: Meghna Granda is a 89 y.o. female.    Patient presents for follow up.    We reviewed labs in detail, most recent creatinine noted to be 0.78 mg/dL with an eGFR of 66 ml/min.    There were no significant electrolyte abnormalities noted.  Patient is taking all medications as prescribed with no specific side effects and denies use of nonsteroid anti-inflammatory medications.              The following portions of the patient's history were reviewed and updated as appropriate: allergies, current medications, past family history, past medical history, past social history, past surgical history and problem list.    Review of Systems   Gastrointestinal:  Positive for constipation.   All other systems reviewed and are negative.        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   Tobacco Use    Smoking status: Never    Smokeless tobacco: Never   Vaping Use    Vaping status: Never Used   Substance and Sexual Activity    Alcohol use: No    Drug use: No    Sexual activity: Not Currently   Other Topics Concern    None   Social History Narrative    No caffeine use     Social Determinants of Health     Financial Resource Strain: Low Risk  (11/1/2023)    Overall Financial Resource Strain (CARDIA)     Difficulty of Paying Living Expenses: Not hard at all   Food Insecurity: Not on file   Transportation Needs: No Transportation Needs (11/1/2023)    PRAPARE - Transportation     Lack of Transportation (Medical): No     Lack of Transportation (Non-Medical): No   Physical Activity: Not on file    Stress: Not on file   Social Connections: Not on file   Intimate Partner Violence: Not on file   Housing Stability: Not on file     Past Medical History:   Diagnosis Date    Cardiac arrhythmia     Coronary artery disease     Disease of thyroid gland     Esophageal reflux     GERD (gastroesophageal reflux disease)     Hypertension     Hypothyroidism     Shingles 11-    Supraventricular tachycardia (HCC) 7/27/2016    Varicella-zoster infection      Past Surgical History:   Procedure Laterality Date    CARDIAC CATHETERIZATION N/A 8/19/2024    Procedure: Cardiac catheterization;  Surgeon: Sampson Engle MD;  Location: [facility];  Service: Cardiology    CARDIAC CATHETERIZATION N/A 8/19/2024    Procedure: Cardiac Coronary Angiogram;  Surgeon: Sampson Engle MD;  Location: [facility];  Service: Cardiology    CARDIAC CATHETERIZATION N/A 8/19/2024    Procedure: Cardiac Left Heart Cath;  Surgeon: Sampson Engle MD;  Location: [facility];  Service: Cardiology    CHOLECYSTECTOMY      COLON SURGERY      CORONARY ANGIOPLASTY      HYSTERECTOMY  1968    PARTIAL HYSTERECTOMY      SMALL INTESTINE SURGERY      Partial    TONSILLECTOMY  age 19       Current Outpatient Medications:     aspirin 81 MG tablet, Take 81 mg by mouth every other day , Disp: , Rfl:     isosorbide mononitrate (IMDUR) 30 mg 24 hr tablet, Take 1 tablet (30 mg total) by mouth daily, Disp: 60 tablet, Rfl: 0    ketotifen (ZADITOR) 0.025 % ophthalmic solution, Administer 1 drop to both eyes Three to four times a day, Disp: , Rfl:     levothyroxine 75 mcg tablet, Take 1 tablet (75 mcg total) by mouth daily, Disp: 90 tablet, Rfl: 1    metoprolol succinate (TOPROL-XL) 25 mg 24 hr tablet, Take 1/2 (one-half) tablet by mouth once daily, Disp: 45 tablet, Rfl: 3    Misc Natural Products (Grape Seed Complex) CAPS, Take by mouth One a day, Disp: , Rfl:     PREVIDENT 5000 PLUS 1.1 % CREA, daily, Disp: , Rfl: 0    Tart Cherry 1200 MG CAPS,  "Take by mouth 500mg   2 daily, Disp: , Rfl:     Lab Results   Component Value Date    SODIUM 130 (L) 10/19/2024    K 4.3 10/19/2024    CL 96 10/19/2024    CO2 25 10/19/2024    AGAP 9 10/19/2024    BUN 14 10/19/2024    CREATININE 0.78 10/19/2024    GLUC 102 08/19/2024    GLUF 104 (H) 10/19/2024    CALCIUM 9.1 10/19/2024    AST 17 10/19/2024    ALT 11 10/19/2024    ALKPHOS 86 10/19/2024    TP 7.2 10/19/2024    TBILI 0.59 10/19/2024    EGFR 66 10/19/2024     Lab Results   Component Value Date    WBC 6.19 10/19/2024    HGB 13.2 10/19/2024    HCT 40.1 10/19/2024    MCV 92 10/19/2024     (H) 10/19/2024     Lab Results   Component Value Date    CHOLESTEROL 172 10/19/2024    CHOLESTEROL 196 10/26/2023    CHOLESTEROL 183 09/26/2022     Lab Results   Component Value Date    HDL 66 10/19/2024    HDL 74 10/26/2023    HDL 72 09/26/2022     Lab Results   Component Value Date    LDLCALC 91 10/19/2024    LDLCALC 109 (H) 10/26/2023    LDLCALC 101 (H) 09/26/2022     Lab Results   Component Value Date    TRIG 74 10/19/2024    TRIG 63 10/26/2023    TRIG 52 09/26/2022     No results found for: \"CHOLHDL\"  Lab Results   Component Value Date    ZFR2QBBOAPWE 1.407 10/19/2024     Lab Results   Component Value Date    PTH 22.5 08/05/2024    CALCIUM 9.1 10/19/2024    PHOS 3.7 10/19/2024     No results found for: \"SPEP\", \"UPEP\"  No results found for: \"MICROALBUR\", \"[id number]\"        Objective:      /88 (BP Location: Left arm, Patient Position: Sitting, Cuff Size: Standard)   Pulse 75   Temp (!) 97.1 °F (36.2 °C)   Ht 4' 11\" (1.499 m)   Wt 58.5 kg (129 lb)   SpO2 97%   BMI 26.05 kg/m²          Physical Exam  Vitals reviewed.   Constitutional:       General: She is not in acute distress.     Appearance: Normal appearance.   HENT:      Head: Normocephalic and atraumatic.      Right Ear: External ear normal.      Left Ear: External ear normal.   Eyes:      Conjunctiva/sclera: Conjunctivae normal.   Cardiovascular:      Rate and " Rhythm: Normal rate and regular rhythm.      Heart sounds: Normal heart sounds.   Pulmonary:      Effort: No respiratory distress.      Breath sounds: No wheezing.   Abdominal:      Palpations: Abdomen is soft.   Skin:     General: Skin is warm and dry.   Neurological:      General: No focal deficit present.      Mental Status: She is alert and oriented to person, place, and time.   Psychiatric:         Mood and Affect: Mood normal.         Behavior: Behavior normal.

## 2024-10-25 NOTE — ASSESSMENT & PLAN NOTE
Blood pressure slightly elevated, continue with current medications.  Continue to encourage low-sodium diet.

## 2024-10-25 NOTE — ASSESSMENT & PLAN NOTE
Lab Results   Component Value Date    EGFR 66 10/19/2024    EGFR 44 08/19/2024    EGFR 55 08/05/2024    CREATININE 0.78 10/19/2024    CREATININE 1.09 08/19/2024    CREATININE 0.91 08/05/2024     Kidney function stable, continue to optimize care and avoid nephrotoxins.

## 2024-10-31 ENCOUNTER — APPOINTMENT (OUTPATIENT)
Dept: LAB | Facility: CLINIC | Age: 89
End: 2024-10-31
Payer: COMMERCIAL

## 2024-10-31 ENCOUNTER — OFFICE VISIT (OUTPATIENT)
Dept: FAMILY MEDICINE CLINIC | Facility: CLINIC | Age: 89
End: 2024-10-31
Payer: COMMERCIAL

## 2024-10-31 VITALS
WEIGHT: 130.38 LBS | TEMPERATURE: 97 F | SYSTOLIC BLOOD PRESSURE: 134 MMHG | OXYGEN SATURATION: 97 % | HEIGHT: 59 IN | DIASTOLIC BLOOD PRESSURE: 64 MMHG | BODY MASS INDEX: 26.28 KG/M2 | HEART RATE: 86 BPM

## 2024-10-31 DIAGNOSIS — R79.89 ELEVATED PLATELET COUNT: ICD-10-CM

## 2024-10-31 DIAGNOSIS — R20.2 PARESTHESIA: Primary | ICD-10-CM

## 2024-10-31 DIAGNOSIS — R79.89 OTHER SPECIFIED ABNORMAL FINDINGS OF BLOOD CHEMISTRY: ICD-10-CM

## 2024-10-31 DIAGNOSIS — Z23 ENCOUNTER FOR IMMUNIZATION: ICD-10-CM

## 2024-10-31 DIAGNOSIS — R53.83 OTHER FATIGUE: ICD-10-CM

## 2024-10-31 DIAGNOSIS — R20.2 PARESTHESIA: ICD-10-CM

## 2024-10-31 LAB
IRON SATN MFR SERPL: 17 % (ref 15–50)
IRON SERPL-MCNC: 49 UG/DL (ref 50–212)
TIBC SERPL-MCNC: 291 UG/DL (ref 250–450)
UIBC SERPL-MCNC: 242 UG/DL (ref 155–355)

## 2024-10-31 PROCEDURE — 87186 SC STD MICRODIL/AGAR DIL: CPT

## 2024-10-31 PROCEDURE — 87077 CULTURE AEROBIC IDENTIFY: CPT

## 2024-10-31 PROCEDURE — 82607 VITAMIN B-12: CPT

## 2024-10-31 PROCEDURE — 99214 OFFICE O/P EST MOD 30 MIN: CPT | Performed by: NURSE PRACTITIONER

## 2024-10-31 PROCEDURE — 36415 COLL VENOUS BLD VENIPUNCTURE: CPT

## 2024-10-31 PROCEDURE — 83550 IRON BINDING TEST: CPT

## 2024-10-31 PROCEDURE — 83540 ASSAY OF IRON: CPT

## 2024-10-31 PROCEDURE — 82728 ASSAY OF FERRITIN: CPT

## 2024-10-31 PROCEDURE — 83036 HEMOGLOBIN GLYCOSYLATED A1C: CPT

## 2024-10-31 PROCEDURE — 87086 URINE CULTURE/COLONY COUNT: CPT

## 2024-10-31 PROCEDURE — G2211 COMPLEX E/M VISIT ADD ON: HCPCS | Performed by: NURSE PRACTITIONER

## 2024-10-31 NOTE — PROGRESS NOTES
Ambulatory Visit  Name: Meghna Granda      : 8/15/1933      MRN: [id number]  Encounter Provider: SHILA Cee  Encounter Date: 10/31/2024   Encounter department: [facility]    Assessment & Plan  Paresthesia  Did not was in the hospital in August for cardiac catheterization for unstable angina.  She had catheterization but states she was on the table for prolonged period of time as the equipment broke and they had to wait for a repair man.  She states that since the procedure, she has had numbness in the tips of her fingers on the right hand.  This was her access point.  She states that it is very slowly getting better over time.  No circulatory compromise on exam today.  No other associated symptoms.  Patient is very worried over this we will check some labs to ensure there is no other cause for paresthesias but likely just related to some nerve damage from procedure may come back as time progresses that she has already noticed a slight improvement in this.  Orders:    Vitamin B12; Future    Hemoglobin A1C; Future    Other specified abnormal findings of blood chemistry  Had an elevated blood sugar on metabolic panel in the past.  Orders:    Hemoglobin A1C; Future    Elevated platelet count  States that she is feeling extremely tired all the time.  Did have a lot of health concerns this past year and unfortunately had 3 family members passed away which has been very difficult on her.  States she was feeling depressed but now she is not feeling depressed anymore just very tired.  Does have elevated platelet count will check her iron to ensure she is not iron deficient. Did recently have TSH   Orders:    Iron Panel (Includes Ferritin, Iron Sat%, Iron, and TIBC); Future    Other fatigue  Did have +leuks on last year, no symptoms except she sometimes feels like she has to urinate and can not, urine culture ordered.   Orders:    Iron Panel (Includes Ferritin, Iron Sat%, Iron,  "and TIBC); Future    Urine culture; Future    Encounter for immunization    Orders:    influenza vaccine, high-dose, PF 0.5 mL (Fluzone High Dose)       History of Present Illness     HPI      Review of Systems   Constitutional:  Negative for chills and fever.   HENT:  Negative for ear pain and sore throat.    Eyes:  Negative for pain and visual disturbance.   Respiratory:  Negative for cough and shortness of breath.    Cardiovascular:  Negative for chest pain and palpitations.   Gastrointestinal:  Negative for abdominal pain and vomiting.   Genitourinary:  Negative for dysuria and hematuria.   Musculoskeletal:  Negative for arthralgias and back pain.   Skin:  Negative for color change and rash.   Neurological:  Negative for seizures and syncope.   All other systems reviewed and are negative.          Objective     /64   Pulse 86   Temp (!) 97 °F (36.1 °C)   Ht 4' 11\" (1.499 m)   Wt 59.1 kg (130 lb 6 oz)   SpO2 97%   BMI 26.33 kg/m²     Physical Exam  Vitals and nursing note reviewed.   Constitutional:       General: She is not in acute distress.     Appearance: She is well-developed.   HENT:      Head: Normocephalic and atraumatic.   Cardiovascular:      Rate and Rhythm: Normal rate and regular rhythm.      Pulses: Normal pulses.      Heart sounds: Normal heart sounds. No murmur heard.  Pulmonary:      Effort: Pulmonary effort is normal. No respiratory distress.      Breath sounds: Normal breath sounds.   Musculoskeletal:      Right wrist: Normal.      Left wrist: Normal.      Right hand: Normal.      Left hand: Normal.      Cervical back: Neck supple.   Skin:     General: Skin is warm and dry.      Capillary Refill: Capillary refill takes less than 2 seconds.   Neurological:      Mental Status: She is alert.   Psychiatric:         Mood and Affect: Mood normal.         "

## 2024-11-01 ENCOUNTER — RA CDI HCC (OUTPATIENT)
Dept: OTHER | Facility: HOSPITAL | Age: 89
End: 2024-11-01

## 2024-11-01 LAB
EST. AVERAGE GLUCOSE BLD GHB EST-MCNC: 137 MG/DL
FERRITIN SERPL-MCNC: 75 NG/ML (ref 11–307)
HBA1C MFR BLD: 6.4 %
VIT B12 SERPL-MCNC: 349 PG/ML (ref 180–914)

## 2024-11-03 LAB — BACTERIA UR CULT: ABNORMAL

## 2024-11-04 DIAGNOSIS — N39.0 URINARY TRACT INFECTION WITHOUT HEMATURIA, SITE UNSPECIFIED: Primary | ICD-10-CM

## 2024-11-04 RX ORDER — LEVOFLOXACIN 250 MG/1
250 TABLET, FILM COATED ORAL DAILY
Qty: 3 TABLET | Refills: 0 | Status: SHIPPED | OUTPATIENT
Start: 2024-11-04 | End: 2024-11-07

## 2024-11-07 ENCOUNTER — OFFICE VISIT (OUTPATIENT)
Dept: FAMILY MEDICINE CLINIC | Facility: CLINIC | Age: 89
End: 2024-11-07
Payer: COMMERCIAL

## 2024-11-07 VITALS
BODY MASS INDEX: 25.91 KG/M2 | TEMPERATURE: 98.2 F | DIASTOLIC BLOOD PRESSURE: 80 MMHG | SYSTOLIC BLOOD PRESSURE: 148 MMHG | WEIGHT: 128.5 LBS | HEART RATE: 74 BPM | OXYGEN SATURATION: 95 % | HEIGHT: 59 IN

## 2024-11-07 DIAGNOSIS — I10 ESSENTIAL HYPERTENSION: ICD-10-CM

## 2024-11-07 DIAGNOSIS — E03.9 ACQUIRED HYPOTHYROIDISM: ICD-10-CM

## 2024-11-07 DIAGNOSIS — R73.03 PREDIABETES: ICD-10-CM

## 2024-11-07 DIAGNOSIS — Z23 ENCOUNTER FOR IMMUNIZATION: ICD-10-CM

## 2024-11-07 DIAGNOSIS — Z00.00 MEDICARE ANNUAL WELLNESS VISIT, SUBSEQUENT: Primary | ICD-10-CM

## 2024-11-07 DIAGNOSIS — R20.2 PARESTHESIA: ICD-10-CM

## 2024-11-07 PROCEDURE — G0439 PPPS, SUBSEQ VISIT: HCPCS | Performed by: NURSE PRACTITIONER

## 2024-11-07 PROCEDURE — 99214 OFFICE O/P EST MOD 30 MIN: CPT | Performed by: NURSE PRACTITIONER

## 2024-11-07 NOTE — PROGRESS NOTES
Ambulatory Visit  Name: Meghna Granda      : 8/15/1933      MRN: [id number]  Encounter Provider: SHILA Cee  Encounter Date: 2024   Encounter department: [facility]  Medicare annual wellness visit, subsequent         Encounter for immunization    Orders:    influenza vaccine, high-dose, PF 0.5 mL (Fluzone High Dose)    Prediabetes  Counseled on diet. Will repeat in 6 months to ensure it is not increasing over time.   Orders:    Hemoglobin A1C; Future    Acquired hypothyroidism  Continue levothyroxine, no symptoms at this time. Will monitor every 6 months.   Orders:    TSH, 3rd generation with Free T4 reflex; Future    Essential hypertension  148/80. Will try to keep goal for <150.        Paresthesia  Still has mild paresthesia in the right hand post cardiac cath. Stable. Not worsening, not effecting her ability to do things through out the day. Will continue to monitor. Labs stable           Preventive health issues were discussed with patient, and age appropriate screening tests were ordered as noted in patient's After Visit Summary. Personalized health advice and appropriate referrals for health education or preventive services given if needed, as noted in patient's After Visit Summary.    History of Present Illness     HPI   Patient Care Team:  SHILA Cee as PCP - General (Family Medicine)  DO Brian Erazo MD Joseph Michael McGinley, DO Luis A Tejada, MD Nicholas Varvarelis, DO (Nephrology)    Review of Systems   Constitutional:  Negative for chills and fever.   HENT:  Negative for ear pain and sore throat.    Eyes:  Negative for pain and visual disturbance.   Respiratory:  Negative for cough and shortness of breath.    Cardiovascular:  Negative for chest pain and palpitations.   Gastrointestinal:  Negative for abdominal pain, constipation, diarrhea, nausea and vomiting.   Genitourinary:  Negative for  dysuria and hematuria.   Musculoskeletal:  Negative for arthralgias and back pain.   Skin:  Negative for color change and rash.   Neurological:  Negative for seizures and syncope.   All other systems reviewed and are negative.    Medical History Reviewed by provider this encounter:  Tobacco  Allergies  Meds  Problems  Med Hx  Surg Hx  Fam Hx       Annual Wellness Visit Questionnaire   Meghan is here for her Subsequent Wellness visit. Last Medicare Wellness visit information reviewed, patient interviewed and updates made to the record today.      Health Risk Assessment:   Patient rates overall health as fair. Patient feels that their physical health rating is slightly worse. Patient is satisfied with their life. Eyesight was rated as same. Hearing was rated as same. Patient feels that their emotional and mental health rating is same. Patients states they are never, rarely angry. Patient states they are often unusually tired/fatigued. Pain experienced in the last 7 days has been some. Patient's pain rating has been 8/10. Patient states that she has experienced no weight loss or gain in last 6 months.     Depression Screening:   PHQ-2 Score: 0      Fall Risk Screening:   In the past year, patient has experienced: no history of falling in past year      Urinary Incontinence Screening:   Patient has leaked urine accidently in the last six months.     Home Safety:  Patient does not have trouble with stairs inside or outside of their home. Patient has working smoke alarms and has working carbon monoxide detector. Home safety hazards include: none.     Nutrition:   Current diet is Regular.     Medications:   Patient is currently taking over-the-counter supplements. OTC medications include: see medication list. Patient is able to manage medications.     Activities of Daily Living (ADLs)/Instrumental Activities of Daily Living (IADLs):   Walk and transfer into and out of bed and chair?: Yes  Dress and groom yourself?:  Yes    Bathe or shower yourself?: Yes    Feed yourself? Yes  Do your laundry/housekeeping?: Yes  Manage your money, pay your bills and track your expenses?: Yes  Make your own meals?: Yes    Do your own shopping?: Yes    Previous Hospitalizations:   Any hospitalizations or ED visits within the last 12 months?: Yes    How many hospitalizations have you had in the last year?: 1-2    Advance Care Planning:   Living will: Yes    Durable POA for healthcare: Yes    Advanced directive: Yes    Advanced directive counseling given: Yes    Five wishes given: No    End of Life Decisions reviewed with patient: Yes    Provider agrees with end of life decisions: Yes      PREVENTIVE SCREENINGS      Cardiovascular Screening:    General: Screening Not Indicated and History Lipid Disorder      Diabetes Screening:     General: Screening Current      Colorectal Cancer Screening:     General: Screening Not Indicated      Cervical Cancer Screening:    General: Screening Not Indicated      Osteoporosis Screening:    General: Screening Not Indicated and History Osteoporosis      Lung Cancer Screening:     General: Screening Not Indicated    Screening, Brief Intervention, and Referral to Treatment (SBIRT)    Screening      Single Item Drug Screening:  How often have you used an illegal drug (including marijuana) or a prescription medication for non-medical reasons in the past year? never    Single Item Drug Screen Score: 0  Interpretation: Negative screen for possible drug use disorder    Social Determinants of Health     Financial Resource Strain: Low Risk  (11/1/2023)    Overall Financial Resource Strain (CARDIA)     Difficulty of Paying Living Expenses: Not hard at all   Food Insecurity: No Food Insecurity (11/7/2024)    Hunger Vital Sign     Worried About Running Out of Food in the Last Year: Never true     Ran Out of Food in the Last Year: Never true   Transportation Needs: No Transportation Needs (11/7/2024)    PRAPARE - Transportation  "    Lack of Transportation (Medical): No     Lack of Transportation (Non-Medical): No   Housing Stability: Low Risk  (11/7/2024)    Housing Stability Vital Sign     Unable to Pay for Housing in the Last Year: No     Number of Times Moved in the Last Year: 0     Homeless in the Last Year: No   Utilities: Not At Risk (11/7/2024)    [facility] Utilities     Threatened with loss of utilities: No     No results found.    Objective     /80   Pulse 74   Temp 98.2 °F (36.8 °C) (Tympanic)   Ht 4' 11\" (1.499 m)   Wt 58.3 kg (128 lb 8 oz)   SpO2 95%   BMI 25.95 kg/m²     Physical Exam  Vitals and nursing note reviewed.   Constitutional:       General: She is not in acute distress.     Appearance: Normal appearance. She is well-developed and normal weight.   HENT:      Head: Normocephalic and atraumatic.      Right Ear: Tympanic membrane, ear canal and external ear normal. There is no impacted cerumen.      Left Ear: Tympanic membrane, ear canal and external ear normal. There is no impacted cerumen.      Nose: Nose normal. No congestion or rhinorrhea.      Mouth/Throat:      Mouth: Mucous membranes are moist.      Pharynx: Oropharynx is clear. No oropharyngeal exudate or posterior oropharyngeal erythema.   Eyes:      General: No scleral icterus.        Right eye: No discharge.         Left eye: No discharge.      Extraocular Movements: Extraocular movements intact.      Conjunctiva/sclera: Conjunctivae normal.      Pupils: Pupils are equal, round, and reactive to light.   Cardiovascular:      Rate and Rhythm: Normal rate and regular rhythm.      Pulses: Normal pulses.      Heart sounds: Normal heart sounds. No murmur heard.  Pulmonary:      Effort: Pulmonary effort is normal. No respiratory distress.      Breath sounds: Normal breath sounds. No wheezing or rales.   Abdominal:      General: Abdomen is flat. Bowel sounds are normal. There is no distension.      Palpations: Abdomen is soft.      Tenderness: There is no " abdominal tenderness. There is no guarding.   Musculoskeletal:      Cervical back: Normal range of motion and neck supple. No rigidity or tenderness.      Right lower leg: No edema.      Left lower leg: No edema.   Lymphadenopathy:      Cervical: No cervical adenopathy.   Skin:     General: Skin is warm and dry.      Capillary Refill: Capillary refill takes less than 2 seconds.      Findings: No bruising, erythema or lesion.   Neurological:      General: No focal deficit present.      Mental Status: She is alert and oriented to person, place, and time. Mental status is at baseline.      Motor: No weakness.      Coordination: Coordination normal.      Gait: Gait normal.   Psychiatric:         Mood and Affect: Mood normal.         Behavior: Behavior normal.         Thought Content: Thought content normal.         Judgment: Judgment normal.

## 2024-11-07 NOTE — PATIENT INSTRUCTIONS

## 2024-11-11 NOTE — ASSESSMENT & PLAN NOTE
Continue levothyroxine, no symptoms at this time. Will monitor every 6 months.   Orders:    TSH, 3rd generation with Free T4 reflex; Future

## 2024-11-23 DIAGNOSIS — I48.91 ATRIAL FIBRILLATION, UNSPECIFIED TYPE (HCC): ICD-10-CM

## 2024-11-25 RX ORDER — METOPROLOL SUCCINATE 25 MG/1
25 TABLET, EXTENDED RELEASE ORAL DAILY
Qty: 90 TABLET | Refills: 1 | Status: SHIPPED | OUTPATIENT
Start: 2024-11-25

## 2024-12-27 DIAGNOSIS — E03.9 ACQUIRED HYPOTHYROIDISM: ICD-10-CM

## 2024-12-27 RX ORDER — LEVOTHYROXINE SODIUM 75 UG/1
75 TABLET ORAL DAILY
Qty: 90 TABLET | Refills: 3 | Status: SHIPPED | OUTPATIENT
Start: 2024-12-27

## 2025-04-07 ENCOUNTER — OFFICE VISIT (OUTPATIENT)
Dept: FAMILY MEDICINE CLINIC | Facility: CLINIC | Age: OVER 89
End: 2025-04-07
Payer: COMMERCIAL

## 2025-04-07 VITALS
OXYGEN SATURATION: 97 % | HEIGHT: 59 IN | BODY MASS INDEX: 24.8 KG/M2 | TEMPERATURE: 98.2 F | WEIGHT: 123 LBS | DIASTOLIC BLOOD PRESSURE: 64 MMHG | HEART RATE: 71 BPM | SYSTOLIC BLOOD PRESSURE: 122 MMHG

## 2025-04-07 DIAGNOSIS — E03.9 ACQUIRED HYPOTHYROIDISM: ICD-10-CM

## 2025-04-07 DIAGNOSIS — N18.31 STAGE 3A CHRONIC KIDNEY DISEASE (HCC): ICD-10-CM

## 2025-04-07 DIAGNOSIS — I10 ESSENTIAL HYPERTENSION: ICD-10-CM

## 2025-04-07 DIAGNOSIS — Z02.4 DRIVER'S PERMIT PE (PHYSICAL EXAMINATION): Primary | ICD-10-CM

## 2025-04-07 DIAGNOSIS — I25.10 CORONARY ARTERY DISEASE INVOLVING NATIVE CORONARY ARTERY OF NATIVE HEART WITHOUT ANGINA PECTORIS: ICD-10-CM

## 2025-04-07 PROCEDURE — G2211 COMPLEX E/M VISIT ADD ON: HCPCS | Performed by: FAMILY MEDICINE

## 2025-04-07 PROCEDURE — 99213 OFFICE O/P EST LOW 20 MIN: CPT | Performed by: FAMILY MEDICINE

## 2025-04-07 NOTE — PROGRESS NOTES
"Assessment/Plan:    No problem-specific Assessment & Plan notes found for this encounter.       Diagnoses and all orders for this visit:    's permit PE (physical examination)    Essential hypertension    Acquired hypothyroidism    Stage 3a chronic kidney disease (HCC)    Coronary artery disease involving native coronary artery of native heart without angina pectoris  Comments:  stable          PHQ-2/9 Depression Screening                Subjective:      Patient ID: Meghna Granda is a 89 y.o. female.    Pt here for drivers physical        The following portions of the patient's history were reviewed and updated as appropriate: allergies, current medications, past family history, past medical history, past social history, past surgical history, and problem list.    Review of Systems   Constitutional:  Negative for chills, fatigue and fever.   HENT: Negative.  Negative for hearing loss.    Eyes: Negative.  Negative for visual disturbance.   Respiratory:  Negative for shortness of breath and wheezing.    Cardiovascular:  Negative for chest pain and palpitations.   Gastrointestinal:  Negative for abdominal pain, blood in stool, constipation, diarrhea, nausea and vomiting.   Endocrine: Negative.    Genitourinary:  Negative for difficulty urinating and dysuria.   Musculoskeletal:  Positive for arthralgias. Negative for myalgias.   Skin: Negative.    Allergic/Immunologic: Negative.    Neurological:  Negative for seizures and syncope.   Hematological:  Negative for adenopathy.   Psychiatric/Behavioral: Negative.           Objective:    /64   Pulse 71   Temp 98.2 °F (36.8 °C)   Ht 4' 11\" (1.499 m)   Wt 55.8 kg (123 lb)   SpO2 97%   BMI 24.84 kg/m²      Physical Exam  Vitals and nursing note reviewed.   Constitutional:       General: She is not in acute distress.     Appearance: Normal appearance. She is well-developed. She is not ill-appearing, toxic-appearing or diaphoretic.   HENT:      Head: " Normocephalic and atraumatic.      Right Ear: Tympanic membrane, ear canal and external ear normal. There is no impacted cerumen.      Left Ear: Tympanic membrane, ear canal and external ear normal. There is no impacted cerumen.      Nose: Nose normal. No congestion or rhinorrhea.      Mouth/Throat:      Mouth: Mucous membranes are moist.      Pharynx: Oropharynx is clear. No oropharyngeal exudate or posterior oropharyngeal erythema.   Eyes:      General: No scleral icterus.        Right eye: No discharge.         Left eye: No discharge.      Conjunctiva/sclera: Conjunctivae normal.      Pupils: Pupils are equal, round, and reactive to light.   Cardiovascular:      Rate and Rhythm: Normal rate and regular rhythm.      Pulses: Normal pulses.      Heart sounds: Normal heart sounds. No murmur heard.  Pulmonary:      Effort: Pulmonary effort is normal. No respiratory distress.      Breath sounds: Normal breath sounds. No wheezing, rhonchi or rales.   Abdominal:      General: Bowel sounds are normal. There is no distension.      Palpations: Abdomen is soft. There is no mass.      Tenderness: There is no abdominal tenderness. There is no guarding or rebound.   Musculoskeletal:         General: Normal range of motion.      Cervical back: Normal range of motion and neck supple. No rigidity.      Right lower leg: No edema.      Left lower leg: No edema.   Lymphadenopathy:      Cervical: No cervical adenopathy.   Skin:     General: Skin is warm and dry.      Findings: No rash.   Neurological:      General: No focal deficit present.      Mental Status: She is alert and oriented to person, place, and time.      Sensory: No sensory deficit.      Motor: No weakness or abnormal muscle tone.      Coordination: Coordination normal.      Gait: Gait normal.      Deep Tendon Reflexes: Reflexes are normal and symmetric.   Psychiatric:         Mood and Affect: Mood normal.         Behavior: Behavior normal.         Thought Content:  Thought content normal.         Judgment: Judgment normal.

## 2025-04-25 ENCOUNTER — OFFICE VISIT (OUTPATIENT)
Dept: CARDIOLOGY CLINIC | Facility: CLINIC | Age: OVER 89
End: 2025-04-25
Payer: COMMERCIAL

## 2025-04-25 VITALS
DIASTOLIC BLOOD PRESSURE: 62 MMHG | SYSTOLIC BLOOD PRESSURE: 140 MMHG | HEART RATE: 62 BPM | OXYGEN SATURATION: 97 % | BODY MASS INDEX: 24.84 KG/M2 | WEIGHT: 123 LBS

## 2025-04-25 DIAGNOSIS — Z98.890 HISTORY OF RADIOFREQUENCY ABLATION (RFA) PROCEDURE FOR CARDIAC ARRHYTHMIA: ICD-10-CM

## 2025-04-25 DIAGNOSIS — I10 ESSENTIAL HYPERTENSION: ICD-10-CM

## 2025-04-25 DIAGNOSIS — E78.00 HYPERCHOLESTEROLEMIA: ICD-10-CM

## 2025-04-25 DIAGNOSIS — I25.10 CORONARY ARTERY DISEASE INVOLVING NATIVE CORONARY ARTERY OF NATIVE HEART WITHOUT ANGINA PECTORIS: Primary | ICD-10-CM

## 2025-04-25 DIAGNOSIS — R06.09 DYSPNEA ON EXERTION: ICD-10-CM

## 2025-04-25 PROCEDURE — 99214 OFFICE O/P EST MOD 30 MIN: CPT | Performed by: INTERNAL MEDICINE

## 2025-04-25 RX ORDER — ISOSORBIDE MONONITRATE 30 MG/1
30 TABLET, EXTENDED RELEASE ORAL 2 TIMES DAILY
Qty: 180 TABLET | Refills: 3 | Status: SHIPPED | OUTPATIENT
Start: 2025-04-25

## 2025-04-25 RX ORDER — NITROGLYCERIN 0.4 MG/1
0.4 TABLET SUBLINGUAL
Qty: 30 TABLET | Refills: 1 | Status: SHIPPED | OUTPATIENT
Start: 2025-04-25

## 2025-04-25 NOTE — PROGRESS NOTES
Cardiology Follow Up    Meghna Granda  8/15/1933  [id number]  [facility]  [address] [facility] [address]  [phone]    1. Coronary artery disease involving native coronary artery of native heart without angina pectoris  isosorbide mononitrate (IMDUR) 30 mg 24 hr tablet    nitroglycerin (NITROSTAT) 0.4 mg SL tablet      2. Essential hypertension        3. History of radiofrequency ablation (RFA) procedure for cardiac arrhythmia        4. Hypercholesterolemia        5. Dyspnea on exertion            Interval History: Cardiology follow-up.  Patient complains of crescendo chest discomfort.  Some prolonged episodes after few hours at a time.  She takes a second isosorbide with improvement.  Symptoms appears to be worsened with emotional distress.  She states she ambulates at home and at home cooking and laundry but she does not venture outside the home now, no bleeding issues on chronic aspirin therapy.  She tries to be compliant with low-cholesterol diet.  She has been statin intolerant in/unwilling in the past, lipids last checked alk phos 172, HDL 66, LDL 91 most recent creatinine 0.7, GFR in the 60s.  Denies any palpitations, no syncope or presyncope.  Sleep hygiene appears to be rather poor.    Patient Active Problem List   Diagnosis    Coronary artery disease involving native coronary artery of native heart without angina pectoris    Hypercholesterolemia    Negative depression screening    History of radiofrequency ablation (RFA) procedure for cardiac arrhythmia    Overweight (BMI 25.0-29.9)    Acquired hypothyroidism    Age related osteoporosis    Hyponatremia    Essential hypertension    Dyspnea on exertion    Stage 3a chronic kidney disease (HCC)    Increase in creatinine    Vitamin D insufficiency     Past Medical History:   Diagnosis Date    Abnormal ECG     Arrhythmia     Cardiac arrhythmia     Coronary artery  disease     Disease of thyroid gland     Ear problems     Decreased hearing    Esophageal reflux     GERD (gastroesophageal reflux disease)     Hypertension     Hypothyroidism     Shingles 11/29/2013    Supraventricular tachycardia (HCC) 07/27/2016    Varicella-zoster infection      Social History     Socioeconomic History    Marital status:      Spouse name: Not on file    Number of children: 3    Years of education: Not on file    Highest education level: Not on file   Occupational History    Occupation: Organist for [employer]     Comment: Retired   Tobacco Use    Smoking status: Never    Smokeless tobacco: Never   Vaping Use    Vaping status: Never Used   Substance and Sexual Activity    Alcohol use: No    Drug use: No    Sexual activity: Not Currently   Other Topics Concern    Not on file   Social History Narrative    No caffeine use     Social Drivers of Health     Financial Resource Strain: Low Risk  (11/1/2023)    Overall Financial Resource Strain (CARDIA)     Difficulty of Paying Living Expenses: Not hard at all   Food Insecurity: No Food Insecurity (11/7/2024)    Hunger Vital Sign     Worried About Running Out of Food in the Last Year: Never true     Ran Out of Food in the Last Year: Never true   Transportation Needs: No Transportation Needs (11/7/2024)    PRAPARE - Transportation     Lack of Transportation (Medical): No     Lack of Transportation (Non-Medical): No   Physical Activity: Not on file   Stress: Not on file   Social Connections: Not on file   Intimate Partner Violence: Not on file   Housing Stability: Low Risk  (11/7/2024)    Housing Stability Vital Sign     Unable to Pay for Housing in the Last Year: No     Number of Times Moved in the Last Year: 0     Homeless in the Last Year: No      Family History   Problem Relation Age of Onset    Heart disease Mother     Heart disease Father     COPD Father      Past Surgical History:   Procedure Laterality Date    ABLATION OF DYSRHYTHMIC FOCUS   07-    Dr. Ames    CARDIAC CATHETERIZATION N/A 08/19/2024    Procedure: Cardiac catheterization;  Surgeon: Sampson Engle MD;  Location: [facility];  Service: Cardiology    CARDIAC CATHETERIZATION N/A 08/19/2024    Procedure: Cardiac Coronary Angiogram;  Surgeon: Sampson Engle MD;  Location: [facility];  Service: Cardiology    CARDIAC CATHETERIZATION N/A 08/19/2024    Procedure: Cardiac Left Heart Cath;  Surgeon: Sampson Engle MD;  Location: [facility];  Service: Cardiology    CHOLECYSTECTOMY      COLON SURGERY      CORONARY ANGIOPLASTY      HYSTERECTOMY  1968    PARTIAL HYSTERECTOMY      SMALL INTESTINE SURGERY      Partial    TONSILLECTOMY  age 19       Current Outpatient Medications:     aspirin 81 MG tablet, Take 81 mg by mouth every other day , Disp: , Rfl:     Cholecalciferol (Vitamin D) 50 MCG (2000 UT) tablet, Take 2,000 Units by mouth daily Taking 4,000 Units daily, Disp: , Rfl:     isosorbide mononitrate (IMDUR) 30 mg 24 hr tablet, Take 1 tablet (30 mg total) by mouth 2 (two) times a day, Disp: 180 tablet, Rfl: 3    ketotifen (ZADITOR) 0.025 % ophthalmic solution, Administer 1 drop to both eyes Three to four times a day (Patient taking differently: Administer 1 drop to both eyes Three to four times a day PRN), Disp: , Rfl:     levothyroxine 75 mcg tablet, TAKE 1 TABLET DAILY, Disp: 90 tablet, Rfl: 3    metoprolol succinate (TOPROL-XL) 25 mg 24 hr tablet, Take 1 tablet (25 mg total) by mouth daily, Disp: 90 tablet, Rfl: 1    Multiple Vitamins-Minerals (PRESERVISION AREDS PO), Take by mouth, Disp: , Rfl:     nitroglycerin (NITROSTAT) 0.4 mg SL tablet, Place 1 tablet (0.4 mg total) under the tongue every 5 (five) minutes as needed for chest pain, Disp: 30 tablet, Rfl: 1    PREVIDENT 5000 PLUS 1.1 % CREA, daily, Disp: , Rfl: 0    Misc Natural Products (Grape Seed Complex) CAPS, Take by mouth One a day (Patient not taking: Reported on 3/26/2025), Disp: , Rfl:     Tart Cherry  1200 MG CAPS, Take by mouth 500mg   2 daily (Patient not taking: Reported on 3/26/2025), Disp: , Rfl:   Allergies   Allergen Reactions    Bextra [Valdecoxib]     Codeine Nausea Only, Headache and Tremor    Demerol [Meperidine] Nausea Only and Headache    Diovan [Valsartan]     Enalapril Cough    Lisinopril Cough    Penicillins     Percocet [Oxycodone-Acetaminophen]     Sulfa Antibiotics Abdominal Pain    Tramadol Nausea Only and Headache    Vicodin [Hydrocodone-Acetaminophen]     Zithromax [Azithromycin] Diarrhea    Zocor [Simvastatin] Hives       Labs:  Appointment on 10/31/2024   Component Date Value    Vitamin B-12 10/31/2024 349     Hemoglobin A1C 10/31/2024 6.4 (H)     EAG 10/31/2024 137     Urine Culture 10/31/2024 >100,000 cfu/ml Escherichia coli (A)     Iron Saturation 10/31/2024 17     TIBC 10/31/2024 291     Iron 10/31/2024 49 (L)     UIBC 10/31/2024 242     Ferritin 10/31/2024 75      Imaging: No results found.    Review of Systems:  Review of Systems   Constitutional:  Positive for activity change and fatigue. Negative for diaphoresis and fever.   HENT:  Negative for nosebleeds.    Eyes:  Negative for visual disturbance.   Respiratory:  Positive for chest tightness. Negative for apnea, shortness of breath, wheezing and stridor.    Cardiovascular:  Positive for chest pain. Negative for palpitations and leg swelling.   Gastrointestinal:  Negative for abdominal pain, anal bleeding and blood in stool.   Endocrine: Negative for cold intolerance.   Genitourinary:  Negative for hematuria.   Musculoskeletal:  Positive for arthralgias and gait problem.   Skin:  Negative for pallor and rash.   Allergic/Immunologic: Negative for immunocompromised state.   Neurological:  Negative for dizziness, syncope, speech difficulty and weakness.   Hematological:  Does not bruise/bleed easily.   Psychiatric/Behavioral:  Positive for sleep disturbance. The patient is nervous/anxious.        Physical Exam:  Physical Exam  Vitals  reviewed.   Constitutional:       General: She is not in acute distress.     Appearance: Normal appearance. She is normal weight. She is not ill-appearing, toxic-appearing or diaphoretic.   Eyes:      General: No scleral icterus.  Neck:      Vascular: No carotid bruit.   Cardiovascular:      Rate and Rhythm: Normal rate and regular rhythm.      Pulses: Normal pulses.      Heart sounds: Normal heart sounds. No murmur heard.     No friction rub. No gallop.   Pulmonary:      Effort: Pulmonary effort is normal. No respiratory distress.      Breath sounds: Normal breath sounds. No stridor. No wheezing, rhonchi or rales.   Musculoskeletal:      Right lower leg: Edema present.      Left lower leg: Edema present.   Skin:     General: Skin is warm and dry.      Capillary Refill: Capillary refill takes less than 2 seconds.   Neurological:      Mental Status: She is alert and oriented to person, place, and time.   Psychiatric:         Mood and Affect: Mood normal.         Discussion/Summary:  Coronary artery disease, PTCA stent of the LAD in 2008.  stress test 2018, she did 4 minutes of Doug protocol, achieving target heart rate, there were no EKG criteria for ischemia or as well symptoms, dyspnea appears to be angina equivalent .   longstanding untreated dyslipidemia because of intolerance and unwillingness to take statin therapy.  Previously, she appeared to be more willing and did try low-dose rosuvastatin every other day followed by every day in 2 weeks.  Even at that regimen she could not tolerate it.  Symptoms are consistent or possibly consistent with angina equivalent,.  echocardiogram.  2022 revealed normal left ventricular systolic function with stage I diastolic dysfunction interestingly so, there was mild left atrial enlargement and mild aortic insufficiency, mild mitral and tricuspid insufficiency with estimated normal pulmonary pressures suggested by the criteria, aortic root was described as mildly dilated  measuring 37 mm which I think is an overcall.  Eventual cardiac catheterization 2024, revealed proximal LAD lesion 50% negative for ischemia by IFR criteria.  Previous mid LAD stent was patent, moderate disease in the diagonal, there was mild disease in the marginal and occluded RCA  with left-to-right collaterals.  Symptoms improved with nitrates.  I asked her to continue, now on a twice daily Imdur, I also added as needed sublingual nitroglycerin..  .  Supraventricular tachycardia, AVNRT, status post RFA ablation of the slow pathway.  In 2016, no clinical recurrences.  Last creatinine 1.2, GFR in the 40s.         This note was completed in part utilizing [employer] direct voice recognition software.   Grammatical errors, random word insertion, spelling mistakes, and incomplete sentences may be an occasional consequence of the system secondary to software limitations, ambient noise and hardware issues. At the time of dictation, efforts were made to edit, clarify and /or correct errors.  Please read the chart carefully and recognize, using context, where substitutions have occurred.  If you have any questions or concerns about the context, text or information contained within the body of this dictation, please contact myself, the provider, for further clarification.

## 2025-05-04 DIAGNOSIS — I48.91 ATRIAL FIBRILLATION, UNSPECIFIED TYPE (HCC): ICD-10-CM

## 2025-05-05 RX ORDER — METOPROLOL SUCCINATE 25 MG/1
25 TABLET, EXTENDED RELEASE ORAL DAILY
Qty: 90 TABLET | Refills: 1 | Status: SHIPPED | OUTPATIENT
Start: 2025-05-05

## 2025-06-13 ENCOUNTER — APPOINTMENT (OUTPATIENT)
Dept: LAB | Facility: CLINIC | Age: OVER 89
End: 2025-06-13
Attending: NURSE PRACTITIONER
Payer: COMMERCIAL

## 2025-06-13 DIAGNOSIS — R73.03 PREDIABETES: ICD-10-CM

## 2025-06-13 DIAGNOSIS — E87.1 HYPONATREMIA: ICD-10-CM

## 2025-06-13 DIAGNOSIS — E03.9 ACQUIRED HYPOTHYROIDISM: ICD-10-CM

## 2025-06-13 LAB
ANION GAP SERPL CALCULATED.3IONS-SCNC: 8 MMOL/L (ref 4–13)
BUN SERPL-MCNC: 13 MG/DL (ref 5–25)
CALCIUM SERPL-MCNC: 9 MG/DL (ref 8.4–10.2)
CHLORIDE SERPL-SCNC: 98 MMOL/L (ref 96–108)
CO2 SERPL-SCNC: 25 MMOL/L (ref 21–32)
CREAT SERPL-MCNC: 0.91 MG/DL (ref 0.6–1.3)
EST. AVERAGE GLUCOSE BLD GHB EST-MCNC: 137 MG/DL
GFR SERPL CREATININE-BSD FRML MDRD: 55 ML/MIN/1.73SQ M
GLUCOSE P FAST SERPL-MCNC: 93 MG/DL (ref 65–99)
HBA1C MFR BLD: 6.4 %
POTASSIUM SERPL-SCNC: 4.7 MMOL/L (ref 3.5–5.3)
SODIUM SERPL-SCNC: 131 MMOL/L (ref 135–147)
T4 FREE SERPL-MCNC: 1.16 NG/DL (ref 0.61–1.12)
TSH SERPL DL<=0.05 MIU/L-ACNC: 0.29 UIU/ML (ref 0.45–4.5)

## 2025-06-13 PROCEDURE — 36415 COLL VENOUS BLD VENIPUNCTURE: CPT

## 2025-06-13 PROCEDURE — 80048 BASIC METABOLIC PNL TOTAL CA: CPT

## 2025-06-13 PROCEDURE — 84443 ASSAY THYROID STIM HORMONE: CPT

## 2025-06-13 PROCEDURE — 84439 ASSAY OF FREE THYROXINE: CPT

## 2025-06-13 PROCEDURE — 83036 HEMOGLOBIN GLYCOSYLATED A1C: CPT

## 2025-06-14 ENCOUNTER — RA CDI HCC (OUTPATIENT)
Dept: OTHER | Facility: HOSPITAL | Age: OVER 89
End: 2025-06-14

## 2025-06-16 ENCOUNTER — RESULTS FOLLOW-UP (OUTPATIENT)
Dept: FAMILY MEDICINE CLINIC | Facility: CLINIC | Age: OVER 89
End: 2025-06-16

## 2025-06-16 DIAGNOSIS — E03.9 ACQUIRED HYPOTHYROIDISM: ICD-10-CM

## 2025-06-16 RX ORDER — LEVOTHYROXINE SODIUM 50 UG/1
50 TABLET ORAL DAILY
Qty: 100 TABLET | Refills: 1 | Status: SHIPPED | OUTPATIENT
Start: 2025-06-16

## 2025-06-19 ENCOUNTER — OFFICE VISIT (OUTPATIENT)
Dept: FAMILY MEDICINE CLINIC | Facility: CLINIC | Age: OVER 89
End: 2025-06-19
Payer: COMMERCIAL

## 2025-06-19 VITALS
DIASTOLIC BLOOD PRESSURE: 60 MMHG | HEIGHT: 59 IN | BODY MASS INDEX: 24.27 KG/M2 | SYSTOLIC BLOOD PRESSURE: 122 MMHG | TEMPERATURE: 98.6 F | HEART RATE: 62 BPM | WEIGHT: 120.38 LBS | OXYGEN SATURATION: 98 %

## 2025-06-19 DIAGNOSIS — K59.00 CONSTIPATION, UNSPECIFIED CONSTIPATION TYPE: ICD-10-CM

## 2025-06-19 DIAGNOSIS — E87.1 HYPONATREMIA: ICD-10-CM

## 2025-06-19 DIAGNOSIS — E03.9 ACQUIRED HYPOTHYROIDISM: ICD-10-CM

## 2025-06-19 DIAGNOSIS — N64.4 BREAST PAIN: Primary | ICD-10-CM

## 2025-06-19 PROCEDURE — G2211 COMPLEX E/M VISIT ADD ON: HCPCS | Performed by: NURSE PRACTITIONER

## 2025-06-19 PROCEDURE — 99214 OFFICE O/P EST MOD 30 MIN: CPT | Performed by: NURSE PRACTITIONER

## 2025-06-19 RX ORDER — CHLORHEXIDINE GLUCONATE ORAL RINSE 1.2 MG/ML
SOLUTION DENTAL
COMMUNITY
Start: 2025-03-25

## 2025-06-19 RX ORDER — SODIUM FLUORIDE 6 MG/ML
PASTE, DENTIFRICE DENTAL 2 TIMES DAILY
COMMUNITY
Start: 2025-03-25

## 2025-06-19 NOTE — ASSESSMENT & PLAN NOTE
Patient has a history of intermittent hyponatremia. She is limiting her fluids to <48 ounces and states she is not getting close to this some days.   She has fatigue, which can be multifactorial.   She has a hx of hyothyroid- TSH was low recently and levothyroxine adjusted.   She sees nephrology. I spoke with Dr. Benz today who recommended she increase her protein to see if this helps.   Had osmolality testing previously so would likely not give anymore information at this time.   Orders:  •  Basic metabolic panel; Future

## 2025-06-19 NOTE — PROGRESS NOTES
Name: Meghna Granda      : 8/15/1933      MRN: [id number]  Encounter Provider: SHILA Cee  Encounter Date: 2025   Encounter department: [facility]  :  Assessment & Plan  Breast pain  Patient had shingles 10 years ago and since, she has had intermittent sharp pain in the left breast. No other breast changes. No fhx of breast cancer   Does not want a mammogram but is agreeable to an u/s   Orders:  •  US breast left limited (diagnostic); Future    Hyponatremia  Patient has a history of intermittent hyponatremia. She is limiting her fluids to <48 ounces and states she is not getting close to this some days.   She has fatigue, which can be multifactorial.   She has a hx of hyothyroid- TSH was low recently and levothyroxine adjusted.   She sees nephrology. I spoke with Dr. Benz today who recommended she increase her protein to see if this helps.   Had osmolality testing previously so would likely not give anymore information at this time.   Orders:  •  Basic metabolic panel; Future    Constipation, unspecified constipation type  Patient states that when she takes her daily fiber supplement, she has a bowel movement daily.  When she forgets, she will go 2 to 3 days without a bowel movement.  Recommend that she continue with her fiber supplement and use MiraLAX as needed.  Follow-up if this does not improve.       Acquired hypothyroidism  Have labs repeated in approximately 6 weeks to assess adjustment of levothyroxine.              History of Present Illness   HPI  Review of Systems   Constitutional:  Positive for fatigue. Negative for chills and fever.   HENT:  Negative for ear pain and sore throat.    Eyes:  Negative for pain and visual disturbance.   Respiratory:  Negative for cough and shortness of breath.    Cardiovascular:  Negative for chest pain and palpitations.   Gastrointestinal:  Negative for abdominal pain and vomiting.   Genitourinary:  Negative for  "dysuria and hematuria.   Musculoskeletal:  Negative for arthralgias and back pain.   Skin:  Negative for color change and rash.   Neurological:  Negative for seizures and syncope.   All other systems reviewed and are negative.      Objective   /60   Pulse 62   Temp 98.6 °F (37 °C)   Ht 4' 11\" (1.499 m)   Wt 54.6 kg (120 lb 6 oz)   SpO2 98%   BMI 24.31 kg/m²      Physical Exam  Vitals and nursing note reviewed.   Constitutional:       General: She is not in acute distress.     Appearance: She is well-developed.   HENT:      Head: Normocephalic and atraumatic.     Cardiovascular:      Rate and Rhythm: Normal rate and regular rhythm.      Pulses: Normal pulses.      Heart sounds: Normal heart sounds. No murmur heard.  Pulmonary:      Effort: Pulmonary effort is normal. No respiratory distress.      Breath sounds: Normal breath sounds.   Abdominal:      General: Abdomen is flat.      Palpations: Abdomen is soft.      Tenderness: There is no abdominal tenderness. There is no guarding.     Musculoskeletal:         General: No swelling.      Cervical back: Neck supple.      Right lower leg: No edema.      Left lower leg: No edema.     Skin:     General: Skin is warm and dry.      Capillary Refill: Capillary refill takes less than 2 seconds.     Neurological:      Mental Status: She is alert.     Psychiatric:         Mood and Affect: Mood normal.         "

## 2025-06-20 ENCOUNTER — TELEPHONE (OUTPATIENT)
Dept: FAMILY MEDICINE CLINIC | Facility: CLINIC | Age: OVER 89
End: 2025-06-20

## 2025-06-20 NOTE — TELEPHONE ENCOUNTER
----- Message from SHILA Marquez sent at 6/19/2025  3:44 PM EDT -----  Please let patient know I spoke with Dr. Benz who recommends that she increase protein in her diet to see if this helps with her hyponatremia.

## 2025-07-28 ENCOUNTER — HOSPITAL ENCOUNTER (OUTPATIENT)
Dept: CT IMAGING | Facility: HOSPITAL | Age: OVER 89
Discharge: HOME | End: 2025-07-28
Attending: FAMILY MEDICINE
Payer: COMMERCIAL

## 2025-07-28 ENCOUNTER — OFFICE VISIT (OUTPATIENT)
Dept: FAMILY MEDICINE CLINIC | Facility: CLINIC | Age: OVER 89
End: 2025-07-28
Payer: COMMERCIAL

## 2025-07-28 VITALS
DIASTOLIC BLOOD PRESSURE: 82 MMHG | OXYGEN SATURATION: 96 % | TEMPERATURE: 98 F | WEIGHT: 116.4 LBS | SYSTOLIC BLOOD PRESSURE: 170 MMHG | HEIGHT: 59 IN | HEART RATE: 79 BPM | BODY MASS INDEX: 23.47 KG/M2

## 2025-07-28 DIAGNOSIS — S09.90XS TRAUMATIC INJURY OF HEAD, SEQUELA: Primary | ICD-10-CM

## 2025-07-28 DIAGNOSIS — S09.90XS TRAUMATIC INJURY OF HEAD, SEQUELA: ICD-10-CM

## 2025-07-28 PROCEDURE — 70450 CT HEAD/BRAIN W/O DYE: CPT

## 2025-07-28 PROCEDURE — G2211 COMPLEX E/M VISIT ADD ON: HCPCS | Performed by: FAMILY MEDICINE

## 2025-07-28 PROCEDURE — 99213 OFFICE O/P EST LOW 20 MIN: CPT | Performed by: FAMILY MEDICINE

## 2025-07-30 ENCOUNTER — TELEPHONE (OUTPATIENT)
Age: OVER 89
End: 2025-07-30

## 2025-08-04 ENCOUNTER — TELEPHONE (OUTPATIENT)
Age: OVER 89
End: 2025-08-04

## 2025-08-04 DIAGNOSIS — L98.9 SKIN LESION: Primary | ICD-10-CM

## 2025-08-19 ENCOUNTER — HOSPITAL ENCOUNTER (OUTPATIENT)
Dept: ULTRASOUND IMAGING | Facility: HOSPITAL | Age: OVER 89
Discharge: HOME | End: 2025-08-19
Attending: NURSE PRACTITIONER
Payer: COMMERCIAL

## 2025-08-19 ENCOUNTER — HOSPITAL ENCOUNTER (OUTPATIENT)
Dept: MAMMOGRAPHY | Facility: HOSPITAL | Age: OVER 89
Discharge: HOME | End: 2025-08-19
Payer: COMMERCIAL

## 2025-08-19 DIAGNOSIS — N64.4 BREAST PAIN, LEFT: ICD-10-CM

## 2025-08-19 DIAGNOSIS — N64.4 BREAST PAIN: ICD-10-CM

## 2025-08-19 PROCEDURE — 77066 DX MAMMO INCL CAD BI: CPT

## 2025-08-19 PROCEDURE — 76642 ULTRASOUND BREAST LIMITED: CPT

## 2025-08-19 PROCEDURE — G0279 TOMOSYNTHESIS, MAMMO: HCPCS

## (undated) DEVICE — TR BAND RADIAL ARTERY COMPRESSION DEVICE: Brand: TR BAND

## (undated) DEVICE — GLIDESHEATH BASIC HYDROPHILIC COATED INTRODUCER SHEATH: Brand: GLIDESHEATH

## (undated) DEVICE — CATH GUIDE LAUNCHER 6FR EBU 3.75

## (undated) DEVICE — GUIDEWIRE WHOLEY HI TORQUE INTERM MOD J .035 145CM

## (undated) DEVICE — DGW .035 FC J3MM 260CM TEF: Brand: EMERALD

## (undated) DEVICE — RADIFOCUS OPTITORQUE ANGIOGRAPHIC CATHETER: Brand: OPTITORQUE

## (undated) DEVICE — PRESSURE GUIDEWIRE: Brand: COMET™ II